# Patient Record
Sex: FEMALE | Race: WHITE | NOT HISPANIC OR LATINO
[De-identification: names, ages, dates, MRNs, and addresses within clinical notes are randomized per-mention and may not be internally consistent; named-entity substitution may affect disease eponyms.]

---

## 2017-02-06 ENCOUNTER — APPOINTMENT (OUTPATIENT)
Dept: INTERNAL MEDICINE | Facility: CLINIC | Age: 62
End: 2017-02-06

## 2017-02-06 VITALS
OXYGEN SATURATION: 97 % | DIASTOLIC BLOOD PRESSURE: 72 MMHG | HEIGHT: 62.5 IN | TEMPERATURE: 97.8 F | SYSTOLIC BLOOD PRESSURE: 118 MMHG | WEIGHT: 127 LBS | BODY MASS INDEX: 22.79 KG/M2 | HEART RATE: 63 BPM

## 2017-02-06 DIAGNOSIS — Z00.00 ENCOUNTER FOR GENERAL ADULT MEDICAL EXAMINATION W/OUT ABNORMAL FINDINGS: ICD-10-CM

## 2017-02-06 DIAGNOSIS — R53.83 OTHER MALAISE: ICD-10-CM

## 2017-02-06 DIAGNOSIS — R53.81 OTHER MALAISE: ICD-10-CM

## 2017-02-07 LAB
ALBUMIN SERPL ELPH-MCNC: 4.7 G/DL
ALP BLD-CCNC: 63 U/L
ALT SERPL-CCNC: 19 U/L
ANION GAP SERPL CALC-SCNC: 19 MMOL/L
APPEARANCE: CLEAR
AST SERPL-CCNC: 22 U/L
BASOPHILS # BLD AUTO: 0.03 K/UL
BASOPHILS NFR BLD AUTO: 0.4 %
BILIRUB SERPL-MCNC: 0.2 MG/DL
BILIRUBIN URINE: NEGATIVE
BLOOD URINE: NEGATIVE
BUN SERPL-MCNC: 18 MG/DL
CALCIUM SERPL-MCNC: 9.8 MG/DL
CHLORIDE SERPL-SCNC: 98 MMOL/L
CHOLEST SERPL-MCNC: 271 MG/DL
CHOLEST/HDLC SERPL: 3.9 RATIO
CO2 SERPL-SCNC: 23 MMOL/L
COLOR: YELLOW
CREAT SERPL-MCNC: 0.83 MG/DL
EOSINOPHIL # BLD AUTO: 0.1 K/UL
EOSINOPHIL NFR BLD AUTO: 1.5 %
GLUCOSE QUALITATIVE U: NORMAL MG/DL
GLUCOSE SERPL-MCNC: 90 MG/DL
HCT VFR BLD CALC: 44.4 %
HDLC SERPL-MCNC: 70 MG/DL
HGB BLD-MCNC: 14 G/DL
IMM GRANULOCYTES NFR BLD AUTO: 0.1 %
KETONES URINE: NEGATIVE
LDLC SERPL CALC-MCNC: 160 MG/DL
LEUKOCYTE ESTERASE URINE: NEGATIVE
LYMPHOCYTES # BLD AUTO: 2.47 K/UL
LYMPHOCYTES NFR BLD AUTO: 37 %
MAN DIFF?: NORMAL
MCHC RBC-ENTMCNC: 30.4 PG
MCHC RBC-ENTMCNC: 31.5 GM/DL
MCV RBC AUTO: 96.5 FL
MONOCYTES # BLD AUTO: 0.46 K/UL
MONOCYTES NFR BLD AUTO: 6.9 %
NEUTROPHILS # BLD AUTO: 3.61 K/UL
NEUTROPHILS NFR BLD AUTO: 54.1 %
NITRITE URINE: NEGATIVE
PH URINE: 6.5
PLATELET # BLD AUTO: 240 K/UL
POTASSIUM SERPL-SCNC: 4.4 MMOL/L
PROT SERPL-MCNC: 7 G/DL
PROTEIN URINE: NEGATIVE MG/DL
RBC # BLD: 4.6 M/UL
RBC # FLD: 13.7 %
SODIUM SERPL-SCNC: 140 MMOL/L
SPECIFIC GRAVITY URINE: 1
TRIGL SERPL-MCNC: 206 MG/DL
TSH SERPL-ACNC: 1.99 UIU/ML
UROBILINOGEN URINE: NORMAL MG/DL
WBC # FLD AUTO: 6.68 K/UL

## 2017-02-08 LAB
APPEARANCE: CLEAR
BILIRUBIN URINE: NEGATIVE
BLOOD URINE: NEGATIVE
COLOR: YELLOW
GLUCOSE QUALITATIVE U: NORMAL MG/DL
KETONES URINE: NEGATIVE
LEUKOCYTE ESTERASE URINE: NEGATIVE
NITRITE URINE: NEGATIVE
PH URINE: 6.5
PROTEIN URINE: NEGATIVE MG/DL
SPECIFIC GRAVITY URINE: 1.01
UROBILINOGEN URINE: NORMAL MG/DL

## 2017-02-09 LAB — 25(OH)D3 SERPL-MCNC: 29.5 NG/ML

## 2017-04-19 ENCOUNTER — MESSAGE (OUTPATIENT)
Age: 62
End: 2017-04-19

## 2018-02-10 ENCOUNTER — MOBILE ON CALL (OUTPATIENT)
Age: 63
End: 2018-02-10

## 2018-02-12 ENCOUNTER — MESSAGE (OUTPATIENT)
Age: 63
End: 2018-02-12

## 2018-05-13 ENCOUNTER — RX RENEWAL (OUTPATIENT)
Age: 63
End: 2018-05-13

## 2019-01-14 ENCOUNTER — FORM ENCOUNTER (OUTPATIENT)
Age: 64
End: 2019-01-14

## 2019-01-15 ENCOUNTER — APPOINTMENT (OUTPATIENT)
Dept: ORTHOPEDIC SURGERY | Facility: CLINIC | Age: 64
End: 2019-01-15
Payer: COMMERCIAL

## 2019-01-15 ENCOUNTER — OUTPATIENT (OUTPATIENT)
Dept: OUTPATIENT SERVICES | Facility: HOSPITAL | Age: 64
LOS: 1 days | End: 2019-01-15
Payer: COMMERCIAL

## 2019-01-15 VITALS
WEIGHT: 127 LBS | HEART RATE: 70 BPM | BODY MASS INDEX: 22.79 KG/M2 | HEIGHT: 62.5 IN | OXYGEN SATURATION: 98 % | DIASTOLIC BLOOD PRESSURE: 85 MMHG | SYSTOLIC BLOOD PRESSURE: 120 MMHG

## 2019-01-15 DIAGNOSIS — M47.816 SPONDYLOSIS W/OUT MYELOPATHY OR RADICULOPATHY, LUMBAR REGION: ICD-10-CM

## 2019-01-15 DIAGNOSIS — M48.061 SPINAL STENOSIS, LUMBAR REGION WITHOUT NEUROGENIC CLAUDICATION: ICD-10-CM

## 2019-01-15 DIAGNOSIS — Z82.69 FAMILY HISTORY OF OTHER DISEASES OF THE MUSCULOSKELETAL SYSTEM AND CONNECTIVE TISSUE: ICD-10-CM

## 2019-01-15 DIAGNOSIS — M51.36 OTHER INTERVERTEBRAL DISC DEGENERATION, LUMBAR REGION: ICD-10-CM

## 2019-01-15 DIAGNOSIS — Z60.2 PROBLEMS RELATED TO LIVING ALONE: ICD-10-CM

## 2019-01-15 PROCEDURE — 99204 OFFICE O/P NEW MOD 45 MIN: CPT

## 2019-01-15 PROCEDURE — 73521 X-RAY EXAM HIPS BI 2 VIEWS: CPT

## 2019-01-15 PROCEDURE — 73521 X-RAY EXAM HIPS BI 2 VIEWS: CPT | Mod: 26

## 2019-01-15 PROCEDURE — 72110 X-RAY EXAM L-2 SPINE 4/>VWS: CPT | Mod: 26

## 2019-01-15 PROCEDURE — 72110 X-RAY EXAM L-2 SPINE 4/>VWS: CPT

## 2019-01-15 SDOH — SOCIAL STABILITY - SOCIAL INSECURITY: PROBLEMS RELATED TO LIVING ALONE: Z60.2

## 2019-01-18 PROBLEM — M47.816 FACET ARTHROPATHY, LUMBAR: Status: ACTIVE | Noted: 2019-01-18

## 2019-01-18 PROBLEM — Z82.69 FAMILY HISTORY OF OSTEOPENIA: Status: ACTIVE | Noted: 2019-01-15

## 2019-01-18 PROBLEM — M51.36 DISC DEGENERATION, LUMBAR: Status: ACTIVE | Noted: 2019-01-18

## 2019-01-18 PROBLEM — M48.061 DEGENERATIVE LUMBAR SPINAL STENOSIS: Status: ACTIVE | Noted: 2019-01-18

## 2019-01-18 NOTE — DISCUSSION/SUMMARY
[de-identified] : I discussed my findings with the patinet.  Ms. Friedman is suffering from right lower back and buttocks pain.  Differential includes degenerative disc disease, facet arthropathy, hip pathology, SI joint, vs. muscular.  Patient wishes to have injections.  Referred to pain management to trial epidural vs. facet injections.  Follow up after injections in 6-8 weeks, sooner if needed.  All questions answered.

## 2019-01-18 NOTE — PHYSICAL EXAM
[FreeTextEntry2] : General: patient is well developed, well nourished, in no acute \par distress, alert and oriented x 3. \par \par Mood and affect: normal\par \par Respiratory: no respiratory distress noted\par \par Skin: no scars over spine, skin intact, no erythema, increased warmth\par \par Alignment:The spine is well compensated in the coronal and sagittal plane.  There is no asymmetry on the medeiros forward bend test\par \par Gait: The patient is able to toe walk and heel walk without difficulty. The patient is able to tandem gait without difficulty.\par \par Palpation: no tenderness to palpation spine or paraspinal region\par \par Range of motion: Lumbar spine ROM is restricted\par \par Neurologic Exam:\par Motor: Manual Muscle testing in the lower extremities is 5 out of 5 in all muscle groups. There is no evidence of muscular atrophy in the lower extremities. Sensory: Sensation to light touch is grossly intact in the lower extremities\par \par Reflexes: DTR are present and symmetric throughout, negative haddad bilaterally, negative inverted radial reflex bilaterally, no clonus, plantar responses are flexor\par \par Hip Exam: + mild pain with IR of right hip, No pain with internal or external rotation of left hip\par \par Special tests: Straight leg raise test negative.  Cross straight leg test negative.  MEENU test negative\par \par Vascular: Examination of the peripheral vascular system demonstrates no evidence of congestion or edema. no evidence of lymphedema bilateral lower extremities, pulses are present and symmetric in both lower extremities.\par \par  [de-identified] : XR bilateral hips/pelvis 1/15/19: minimal degenerative changes in both hips, no fracture or dislocation\par \par XR lumbar 1/15/19: degenerative changes involving L4/5 and L5/S1 facets, mild L5/S1 disc height loss, no spondylolisthesis or dynamic instability, thoracolumbar levoscoliosis with apex at L1, no acute fractures\par \par MRI lumbar 11/2018: L4/5 and L5/S1 moderate/severe facet arthropathy; L4/5 grade 1 anterolisthesis, no significant central canal or foraminal stenosis

## 2019-01-18 NOTE — HISTORY OF PRESENT ILLNESS
[All Other ROS Normal] : All other review of systems are negative except as noted [FreeTextEntry1] : low back/buttock pain for 1 year [FreeTextEntry2] : Ms. AGUDELO is a very pleasant 63 year old female who complains of low back pain for 1 year, atraumatic. On initial presentation symptoms were as follows: Patient described the pain as intermittent.  Patient rated the pain as 7/10 severity.  The pain localized to right side of her lower back/right buttock.  There is no radiation of pain.  Patient  denies extremity numbness and paresthesias. The pain is relieved by lying down.  The pain is worsened by activity. Past treatments included pharmacologic management, with minimal temporary relief. She had 1 session of acupuncture with significant temporary relief, will not return to do expense. The patient has not had physical therapy or steroid injections.\par \par The patient reports no loss of hand dexterity.\par The patient states there no loss of balance when walking.\par There no sensory loss in the arms or legs\par The patent no difficulty with urination.\par \par The patient no history of previous spine surgery.\par The patient no previous spine consultation.\par \par The patient has no history of unexpected weight loss, no history of active cancer, no history bladder or bowel dysfunction, no night pain, no fevers or chills.\par \par The past medical history, surgical history, family history, allergies, medications, review of systems, family history and social history were reviewed and non contributory.\par \par

## 2019-10-02 PROBLEM — Z60.2 PERSON LIVING ALONE: Status: ACTIVE | Noted: 2019-01-15

## 2020-01-08 ENCOUNTER — APPOINTMENT (OUTPATIENT)
Dept: INTERNAL MEDICINE | Facility: CLINIC | Age: 65
End: 2020-01-08
Payer: COMMERCIAL

## 2020-01-08 VITALS
WEIGHT: 128 LBS | RESPIRATION RATE: 14 BRPM | SYSTOLIC BLOOD PRESSURE: 130 MMHG | OXYGEN SATURATION: 94 % | BODY MASS INDEX: 23.55 KG/M2 | TEMPERATURE: 98 F | HEIGHT: 62 IN | HEART RATE: 84 BPM | DIASTOLIC BLOOD PRESSURE: 80 MMHG

## 2020-01-08 DIAGNOSIS — F32.9 MAJOR DEPRESSIVE DISORDER, SINGLE EPISODE, UNSPECIFIED: ICD-10-CM

## 2020-01-08 DIAGNOSIS — Z00.00 ENCOUNTER FOR GENERAL ADULT MEDICAL EXAMINATION W/OUT ABNORMAL FINDINGS: ICD-10-CM

## 2020-01-08 PROCEDURE — 93000 ELECTROCARDIOGRAM COMPLETE: CPT

## 2020-01-08 PROCEDURE — 99396 PREV VISIT EST AGE 40-64: CPT

## 2020-01-08 PROCEDURE — 36415 COLL VENOUS BLD VENIPUNCTURE: CPT

## 2020-01-08 NOTE — PLAN
[FreeTextEntry1] : wellness complete\par  labs todayu\par f/u ECHO\par Back pain - PMR eval - \par Cardiac CT\par Valtrex for herpes labialis

## 2020-01-08 NOTE — HISTORY OF PRESENT ILLNESS
[de-identified] : \par reports she had a CPE 1.5 yrs ago with Dr Castellon.  \par Having low back pain- considering accupuncture. CSI.  \par reports she has a murmur- Echo performed.\par Colonoscopy in 2015 - due this year.  \par Mammogram - Gyn eval q 2 years.\par On effexor. \par ALready had a flu shot. \par Not shingrix yet.  \par playing singles tennis. \par \par Following with psychiatrist regularly

## 2020-01-08 NOTE — PHYSICAL EXAM
[Normal Rate] : normal rate  [No Edema] : there was no peripheral edema [Regular Rhythm] : with a regular rhythm [Normal] : affect was normal and insight and judgment were intact [de-identified] : 3/6 jazmine left axillary line

## 2020-01-10 LAB
25(OH)D3 SERPL-MCNC: 26.1 NG/ML
ALBUMIN SERPL ELPH-MCNC: 4.6 G/DL
ALP BLD-CCNC: 58 U/L
ALT SERPL-CCNC: 24 U/L
ANION GAP SERPL CALC-SCNC: 13 MMOL/L
APPEARANCE: CLEAR
AST SERPL-CCNC: 19 U/L
BASOPHILS # BLD AUTO: 0.06 K/UL
BASOPHILS NFR BLD AUTO: 0.9 %
BILIRUB SERPL-MCNC: 0.2 MG/DL
BILIRUBIN URINE: NEGATIVE
BLOOD URINE: NEGATIVE
BUN SERPL-MCNC: 15 MG/DL
CALCIUM SERPL-MCNC: 9.7 MG/DL
CHLORIDE SERPL-SCNC: 101 MMOL/L
CHOLEST SERPL-MCNC: 242 MG/DL
CHOLEST/HDLC SERPL: 4.1 RATIO
CO2 SERPL-SCNC: 26 MMOL/L
COLOR: YELLOW
CREAT SERPL-MCNC: 0.84 MG/DL
EOSINOPHIL # BLD AUTO: 0.16 K/UL
EOSINOPHIL NFR BLD AUTO: 2.5 %
ESTIMATED AVERAGE GLUCOSE: 111 MG/DL
GLUCOSE QUALITATIVE U: NEGATIVE
GLUCOSE SERPL-MCNC: 74 MG/DL
HBA1C MFR BLD HPLC: 5.5 %
HCT VFR BLD CALC: 44.8 %
HDLC SERPL-MCNC: 59 MG/DL
HGB BLD-MCNC: 13.2 G/DL
IMM GRANULOCYTES NFR BLD AUTO: 0.2 %
KETONES URINE: NEGATIVE
LDLC SERPL CALC-MCNC: 134 MG/DL
LEUKOCYTE ESTERASE URINE: NEGATIVE
LYMPHOCYTES # BLD AUTO: 2.31 K/UL
LYMPHOCYTES NFR BLD AUTO: 35.6 %
MAN DIFF?: NORMAL
MCHC RBC-ENTMCNC: 29.5 GM/DL
MCHC RBC-ENTMCNC: 29.7 PG
MCV RBC AUTO: 100.7 FL
MONOCYTES # BLD AUTO: 0.69 K/UL
MONOCYTES NFR BLD AUTO: 10.6 %
NEUTROPHILS # BLD AUTO: 3.26 K/UL
NEUTROPHILS NFR BLD AUTO: 50.2 %
NITRITE URINE: NEGATIVE
PH URINE: 6
PLATELET # BLD AUTO: 241 K/UL
POTASSIUM SERPL-SCNC: 4.1 MMOL/L
PROT SERPL-MCNC: 6.7 G/DL
PROTEIN URINE: NEGATIVE
RBC # BLD: 4.45 M/UL
RBC # FLD: 13.3 %
SODIUM SERPL-SCNC: 140 MMOL/L
SPECIFIC GRAVITY URINE: 1.02
TRIGL SERPL-MCNC: 244 MG/DL
TSH SERPL-ACNC: 3.13 UIU/ML
UROBILINOGEN URINE: NORMAL
WBC # FLD AUTO: 6.49 K/UL

## 2020-01-14 ENCOUNTER — OUTPATIENT (OUTPATIENT)
Dept: OUTPATIENT SERVICES | Facility: HOSPITAL | Age: 65
LOS: 1 days | End: 2020-01-14

## 2020-01-28 ENCOUNTER — APPOINTMENT (OUTPATIENT)
Dept: CT IMAGING | Facility: CLINIC | Age: 65
End: 2020-01-28

## 2020-02-05 ENCOUNTER — APPOINTMENT (OUTPATIENT)
Dept: DERMATOLOGY | Facility: CLINIC | Age: 65
End: 2020-02-05
Payer: COMMERCIAL

## 2020-02-05 DIAGNOSIS — Z12.83 ENCOUNTER FOR SCREENING FOR MALIGNANT NEOPLASM OF SKIN: ICD-10-CM

## 2020-02-05 DIAGNOSIS — L85.9 EPIDERMAL THICKENING, UNSPECIFIED: ICD-10-CM

## 2020-02-05 DIAGNOSIS — L82.1 OTHER SEBORRHEIC KERATOSIS: ICD-10-CM

## 2020-02-05 DIAGNOSIS — Z91.89 OTHER SPECIFIED PERSONAL RISK FACTORS, NOT ELSEWHERE CLASSIFIED: ICD-10-CM

## 2020-02-05 DIAGNOSIS — B07.8 OTHER VIRAL WARTS: ICD-10-CM

## 2020-02-05 PROCEDURE — 99203 OFFICE O/P NEW LOW 30 MIN: CPT | Mod: 25

## 2020-02-05 PROCEDURE — 17110 DESTRUCTION B9 LES UP TO 14: CPT

## 2021-03-29 ENCOUNTER — APPOINTMENT (OUTPATIENT)
Dept: HEART AND VASCULAR | Facility: CLINIC | Age: 66
End: 2021-03-29
Payer: MEDICARE

## 2021-03-29 ENCOUNTER — NON-APPOINTMENT (OUTPATIENT)
Age: 66
End: 2021-03-29

## 2021-03-29 VITALS
WEIGHT: 129.57 LBS | TEMPERATURE: 98.7 F | OXYGEN SATURATION: 97 % | BODY MASS INDEX: 23.84 KG/M2 | HEART RATE: 73 BPM | SYSTOLIC BLOOD PRESSURE: 130 MMHG | HEIGHT: 62 IN | DIASTOLIC BLOOD PRESSURE: 58 MMHG

## 2021-03-29 DIAGNOSIS — E78.5 HYPERLIPIDEMIA, UNSPECIFIED: ICD-10-CM

## 2021-03-29 PROCEDURE — 93000 ELECTROCARDIOGRAM COMPLETE: CPT

## 2021-03-29 PROCEDURE — 99203 OFFICE O/P NEW LOW 30 MIN: CPT

## 2021-03-29 RX ORDER — FLUOXETINE HYDROCHLORIDE 10 MG/1
10 CAPSULE ORAL
Qty: 30 | Refills: 0 | Status: DISCONTINUED | COMMUNITY
Start: 2021-03-20 | End: 2021-03-29

## 2021-03-29 NOTE — HISTORY OF PRESENT ILLNESS
[FreeTextEntry1] : PCP- Dr Castellon\par Psych- Dr Jamari Saldaña\par \par Pt is the neice of Dr Den Friedman

## 2021-03-29 NOTE — PHYSICAL EXAM
[General Appearance - Well Developed] : well developed [Normal Appearance] : normal appearance [Well Groomed] : well groomed [General Appearance - Well Nourished] : well nourished [No Deformities] : no deformities [General Appearance - In No Acute Distress] : no acute distress [Normal Conjunctiva] : the conjunctiva exhibited no abnormalities [Eyelids - No Xanthelasma] : the eyelids demonstrated no xanthelasmas [Heart Rate And Rhythm] : heart rate and rhythm were normal [Heart Sounds] : normal S1 and S2 [Arterial Pulses Normal] : the arterial pulses were normal [Edema] : no peripheral edema present [Veins - Varicosity Changes] : no varicosital changes were noted in the lower extremities [Respiration, Rhythm And Depth] : normal respiratory rhythm and effort [Exaggerated Use Of Accessory Muscles For Inspiration] : no accessory muscle use [Auscultation Breath Sounds / Voice Sounds] : lungs were clear to auscultation bilaterally [Abdomen Soft] : soft [Abdomen Tenderness] : non-tender [Abdomen Mass (___ Cm)] : no abdominal mass palpated [Abnormal Walk] : normal gait [Gait - Sufficient For Exercise Testing] : the gait was sufficient for exercise testing [Nail Clubbing] : no clubbing of the fingernails [Cyanosis, Localized] : no localized cyanosis [Petechial Hemorrhages (___cm)] : no petechial hemorrhages [Skin Color & Pigmentation] : normal skin color and pigmentation [] : no rash [No Venous Stasis] : no venous stasis [Skin Lesions] : no skin lesions [No Skin Ulcers] : no skin ulcer [No Xanthoma] : no  xanthoma was observed [FreeTextEntry1] : 2/6 mid systolic murmur preceded by a click.

## 2021-03-29 NOTE — ASSESSMENT
[FreeTextEntry1] : Murmur- has a "click-murmur" c/w MVP.  Echo ordered,\par \par Fam hx of CAD- has a CCS of Zero

## 2021-03-29 NOTE — REASON FOR VISIT
[FreeTextEntry1] : 66 y/o F with a h/o a murmur.   Has a CCS of zero.  Walks a lot, plays tennis.  had both Covid shots.\par \par \par EKG: NSR, normal axis and intervals, no ST-Tw abnormalities. 3/29/21

## 2021-04-14 ENCOUNTER — APPOINTMENT (OUTPATIENT)
Dept: HEART AND VASCULAR | Facility: CLINIC | Age: 66
End: 2021-04-14
Payer: MEDICARE

## 2021-04-14 PROCEDURE — 93306 TTE W/DOPPLER COMPLETE: CPT

## 2021-10-19 ENCOUNTER — APPOINTMENT (OUTPATIENT)
Dept: HEART AND VASCULAR | Facility: CLINIC | Age: 66
End: 2021-10-19
Payer: MEDICARE

## 2021-10-19 DIAGNOSIS — R06.02 SHORTNESS OF BREATH: ICD-10-CM

## 2021-10-19 PROCEDURE — 93325 DOPPLER ECHO COLOR FLOW MAPG: CPT

## 2021-10-19 PROCEDURE — 93321 DOPPLER ECHO F-UP/LMTD STD: CPT

## 2021-10-19 PROCEDURE — 93350 STRESS TTE ONLY: CPT

## 2021-11-08 ENCOUNTER — NON-APPOINTMENT (OUTPATIENT)
Age: 66
End: 2021-11-08

## 2021-11-08 ENCOUNTER — APPOINTMENT (OUTPATIENT)
Dept: CARDIOTHORACIC SURGERY | Facility: CLINIC | Age: 66
End: 2021-11-08
Payer: MEDICARE

## 2021-11-08 VITALS
SYSTOLIC BLOOD PRESSURE: 154 MMHG | HEART RATE: 83 BPM | BODY MASS INDEX: 23 KG/M2 | OXYGEN SATURATION: 96 % | TEMPERATURE: 96.8 F | DIASTOLIC BLOOD PRESSURE: 80 MMHG | HEIGHT: 62 IN | WEIGHT: 125 LBS | RESPIRATION RATE: 17 BRPM

## 2021-11-08 DIAGNOSIS — I50.32 CHRONIC DIASTOLIC (CONGESTIVE) HEART FAILURE: ICD-10-CM

## 2021-11-08 PROCEDURE — 99204 OFFICE O/P NEW MOD 45 MIN: CPT

## 2021-11-10 PROBLEM — I50.32 CHRONIC DIASTOLIC HEART FAILURE: Status: ACTIVE | Noted: 2021-11-10

## 2021-11-10 RX ORDER — TRAZODONE HYDROCHLORIDE 50 MG/1
50 TABLET ORAL
Refills: 0 | Status: ACTIVE | COMMUNITY
Start: 2021-01-21

## 2021-11-22 ENCOUNTER — FORM ENCOUNTER (OUTPATIENT)
Age: 66
End: 2021-11-22

## 2021-11-23 ENCOUNTER — OUTPATIENT (OUTPATIENT)
Dept: OUTPATIENT SERVICES | Facility: HOSPITAL | Age: 66
LOS: 1 days | End: 2021-11-23
Payer: MEDICARE

## 2021-11-23 ENCOUNTER — APPOINTMENT (OUTPATIENT)
Dept: CARDIOTHORACIC SURGERY | Facility: CLINIC | Age: 66
End: 2021-11-23
Payer: MEDICARE

## 2021-11-23 VITALS
TEMPERATURE: 97.4 F | BODY MASS INDEX: 23 KG/M2 | HEIGHT: 62 IN | WEIGHT: 125 LBS | OXYGEN SATURATION: 95 % | HEART RATE: 80 BPM | SYSTOLIC BLOOD PRESSURE: 131 MMHG | DIASTOLIC BLOOD PRESSURE: 63 MMHG | RESPIRATION RATE: 18 BRPM

## 2021-11-23 DIAGNOSIS — I34.1 NONRHEUMATIC MITRAL (VALVE) PROLAPSE: ICD-10-CM

## 2021-11-23 DIAGNOSIS — I34.0 NONRHEUMATIC MITRAL (VALVE) INSUFFICIENCY: ICD-10-CM

## 2021-11-23 PROCEDURE — 99214 OFFICE O/P EST MOD 30 MIN: CPT

## 2021-11-23 PROCEDURE — 93306 TTE W/DOPPLER COMPLETE: CPT | Mod: 26

## 2021-11-23 PROCEDURE — 76377 3D RENDER W/INTRP POSTPROCES: CPT | Mod: 26

## 2021-11-23 PROCEDURE — 93312 ECHO TRANSESOPHAGEAL: CPT

## 2021-11-23 PROCEDURE — 93312 ECHO TRANSESOPHAGEAL: CPT | Mod: 26

## 2021-11-23 PROCEDURE — 93306 TTE W/DOPPLER COMPLETE: CPT

## 2021-11-24 PROBLEM — I34.0 MITRAL REGURGITATION: Status: ACTIVE | Noted: 2021-11-10

## 2021-11-24 NOTE — PHYSICAL EXAM
[General Appearance - Alert] : alert [General Appearance - In No Acute Distress] : in no acute distress [General Appearance - Well Nourished] : well nourished [General Appearance - Well Developed] : well developed [Sclera] : the sclera and conjunctiva were normal [PERRL With Normal Accommodation] : pupils were equal in size, round, and reactive to light [Extraocular Movements] : extraocular movements were intact [Outer Ear] : the ears and nose were normal in appearance [Hearing Threshold Finger Rub Not Sweet [place]] : hearing was normal [Both Tympanic Membranes Were Examined] : both tympanic membranes were normal [Neck Appearance] : the appearance of the neck was normal [Neck Cervical Mass (___cm)] : no neck mass was observed [Jugular Venous Distention Increased] : there was no jugular-venous distention [Respiration, Rhythm And Depth] : normal respiratory rhythm and effort [Exaggerated Use Of Accessory Muscles For Inspiration] : no accessory muscle use [Auscultation Breath Sounds / Voice Sounds] : lungs were clear to auscultation bilaterally [Apical Impulse] : the apical impulse was normal [Heart Rate And Rhythm] : heart rate was normal and rhythm regular [Heart Sounds] : normal S1 and S2 [Systolic grade ___/6] : A grade [unfilled]/6 systolic murmur was heard. [Examination Of The Chest] : the chest was normal in appearance [Chest Visual Inspection Thoracic Asymmetry] : no chest asymmetry [2+] : left 2+ [No Abnormalities] : the abdominal aorta was not enlarged and no bruit was heard [Bowel Sounds] : normal bowel sounds [Abdomen Soft] : soft [Abdomen Tenderness] : non-tender [No CVA Tenderness] : no ~M costovertebral angle tenderness [Abnormal Walk] : normal gait [Nail Clubbing] : no clubbing  or cyanosis of the fingernails [Musculoskeletal - Swelling] : no joint swelling seen [Skin Color & Pigmentation] : normal skin color and pigmentation [Skin Turgor] : normal skin turgor [] : no rash [Cranial Nerves] : cranial nerves 2-12 were intact [Deep Tendon Reflexes (DTR)] : deep tendon reflexes were 2+ and symmetric [Sensation] : the sensory exam was normal to light touch and pinprick [Oriented To Time, Place, And Person] : oriented to person, place, and time [Impaired Insight] : insight and judgment were intact [Affect] : the affect was normal [Right Carotid Bruit] : no bruit heard over the right carotid [Left Carotid Bruit] : no bruit heard over the left carotid [FreeTextEntry1] : deferred

## 2021-11-24 NOTE — HISTORY OF PRESENT ILLNESS
[FreeTextEntry1] : 66 year old female with a family history of heart disease and a past medical history of depression, hyperlipidemia and chronic diastolic heart failure with severe mitral regurgitation who presents for surgical consultation. She is currently under the care of Dr. Zuniga and Dr. Castellon.\par \par The patient was diagnosed with mitral valve prolapse many years ago and has been followed with serial studies. She states she has been feeling well without any over cardiac symptoms. She reports being able to walk miles in the city without any symptoms or limitations. She does recall one episode of shortness of breath while playing tennis this summer that felt abnormal to her. She also reports feeling 'winded' with strenuous activity. The patient denies chest pain, orthopnea, PND, dizziness, syncope, LE edema and palpitations. \par \par The patient works part time as an . She remains independent with her ADLs. She lives at home with her spouse. \par

## 2021-11-24 NOTE — END OF VISIT
[Time Spent: ___ minutes] : I have spent [unfilled] minutes of time on the encounter. [FreeTextEntry3] : I, JERI BUTLER , am scribing for and in the presence of Deni Peña the following sections: History of present illness, past Medical/family/surgical/family/social history, review of systems, vital signs, physical exam and disposition.\par I personally performed the services described in the documentation, reviewed the documentation recorded by the scribe in my presence and it accurately and completely records my words and actions.\par

## 2021-11-24 NOTE — DATA REVIEWED
[FreeTextEntry1] : Echocardiogram 11/23/21: \par 1. There is mitral valve prolapse of the anterior and posterior leaflets. There is at least moderate late systolic mitral regurgitation seen at a blood pressure of 161/88 mmHg.\par 2. No other significant valvular disease.\par 3. Normal left and right ventricular size and systolic function.\par 4. Trivial pericardial effusion.\par 5. No prior echo is available for comparison.\par \par MATIAS 11/23/21\par 1. No LA/RA/QAMAR/RAA thrombus seen.\par 2. There is mitral valve prolapse of the anterior and posterior leaflets. Although the mitral regurgitation is late systolic only, medial jet quantification is moderate via PISA method. There is an additional lateral jet. Overall, probably moderate to severe mitral regurgitation at a blood pressure of 140/70 mmHg. Clinical correlation advised.\par 3. No other significant valvular disease.\par 4. Normal left and right ventricular size and systolic function.\par 5. No pericardial effusion.\par 6. There is no significant plaque seen in the visualized portion of the descending aorta. There is mild non-mobile plaque seen in the visualized portion of the aortic arch.\par 7. Results of the study were discussed with Dr. Peña.\par \par Stress Echocardiogram 10/19/21:\par Exercise Data: \par Exercise Protocol: Juan Luis \par Duration: 7.5 minutes \par Peak heart rate: 164 \par Peak blood pressure: 160/90 \par Reason for terminating: achieved target HR \par Maximal Stage: 3 \par Symptoms: no chest pain \par ECG Changes: 0 - 0.5 mm upsloping ST depression \par Arrhythmias: no arrhythmias \par Patient achieved 103 percent of the maximum predicted heart rate \par \par Echocardiographic Findings: \par Normal segmental wall motion with an appropriate hyperdynamic response. \par \par 1. Normal exercise tolerance. \par 2. Normal ECG response to treadmill exercise. \par 3. Normal echocardiographic response to treadmill exercise. \par 4. Myxomatous MV with bileaflet prolapse, severe MR pre and post exercise, there is flow reversal in the pulmonary veins. \par \par Echocardiogram 4/14/21: \par 1. Normal right and left ventricular systolic function. Abnormal LV diastolic function.Ejection fraction is 60%-65% by visual estimate. \par 2. MVP with moderate to severe MR.Trace -mild TR.RVSP is 34 mmHg. \par 3. No pericardial effusion. \par

## 2021-11-24 NOTE — ASSESSMENT
[FreeTextEntry1] : 66 year old female with a family history of heart disease and a past medical history of depression, hyperlipidemia and chronic diastolic heart failure with severe mitral regurgitation who presents for surgical consultation. Dr. Peña reviewed the echocardiogram images with the patient and her  and discussed the case with Dr. Alcaraz and Dr. Zuniga. Dr. Peañ discussed the risks, benefits and alternatives to surgery and the various surgical approaches, minimally invasive versus sternotomy. Risks include but not limited to death, heart attack, bleeding, stroke, kidney problems and infection. Dr. Peña quoted a less than 1% operative mortality and complication risk. Dr. Peña feels the patient will benefit from and is a candidate for Minimally Invasive Mitral Valve Repair. All questions were addressed. \par \par At this time Dr. Peña feels that intervention is not urgent. He recommended that is she decides to wait more than 3  months to undergo another stress echocardiogram with her cardiologist.\par \par Plan: \par 1.The patient would like to wait until January and will call us to set up surgical date. \par 2. Continue medication regimen.\par 3. Follow up with cardiologist and PCP.\par 4. Blood pressure management.\par  \par

## 2021-11-29 NOTE — HISTORY OF PRESENT ILLNESS
[FreeTextEntry1] : \par 66 year old female with a family history of heart disease and a past medical history of depression, hyperlipidemia and chronic diastolic heart failure with severe mitral regurgitation who was referred for further evaluation of her structural heart disease. \par \par The patient was diagnosed with mitral valve prolapse many years ago and has been followed with serial studies. She states she has been feeling well without any over cardiac symptoms. She reports being able to walk miles in the city without any symptoms or limitations. She does recall one episode of shortness of breath while playing tennis this summer that felt abnormal to her. The patient denies chest pain, orthopnea, PND, dizziness, syncope, LE edema and palpitations. The patient has an ECHO in April that showed moderate to severe MR. She recently underwent a stress ECHO that showed severe mitral regurgitation before and after exercise.  \par \par The patient works part time as an . She remains independent with her ADLs. She lives at home with her spouse. \par \par

## 2022-07-16 ENCOUNTER — INPATIENT (INPATIENT)
Facility: HOSPITAL | Age: 67
LOS: 11 days | Discharge: HOME | DRG: 885 | End: 2022-07-28
Attending: PSYCHIATRY & NEUROLOGY | Admitting: PSYCHIATRY & NEUROLOGY
Payer: MEDICARE

## 2022-07-16 VITALS
WEIGHT: 121.03 LBS | SYSTOLIC BLOOD PRESSURE: 153 MMHG | RESPIRATION RATE: 18 BRPM | HEART RATE: 120 BPM | DIASTOLIC BLOOD PRESSURE: 109 MMHG | OXYGEN SATURATION: 95 % | TEMPERATURE: 98 F

## 2022-07-16 DIAGNOSIS — F33.2 MAJOR DEPRESSIVE DISORDER, RECURRENT SEVERE WITHOUT PSYCHOTIC FEATURES: ICD-10-CM

## 2022-07-16 LAB
ALBUMIN SERPL ELPH-MCNC: 4.6 G/DL — SIGNIFICANT CHANGE UP (ref 3.3–5)
ALP SERPL-CCNC: 78 U/L — SIGNIFICANT CHANGE UP (ref 40–120)
ALT FLD-CCNC: 11 U/L — SIGNIFICANT CHANGE UP (ref 10–45)
AMPHET UR-MCNC: NEGATIVE — SIGNIFICANT CHANGE UP
ANION GAP SERPL CALC-SCNC: 13 MMOL/L — SIGNIFICANT CHANGE UP (ref 5–17)
APAP SERPL-MCNC: <5 UG/ML — LOW (ref 10–30)
AST SERPL-CCNC: 17 U/L — SIGNIFICANT CHANGE UP (ref 10–40)
BARBITURATES UR SCN-MCNC: NEGATIVE — SIGNIFICANT CHANGE UP
BASOPHILS # BLD AUTO: 0.04 K/UL — SIGNIFICANT CHANGE UP (ref 0–0.2)
BASOPHILS NFR BLD AUTO: 0.5 % — SIGNIFICANT CHANGE UP (ref 0–2)
BENZODIAZ UR-MCNC: NEGATIVE — SIGNIFICANT CHANGE UP
BILIRUB SERPL-MCNC: 0.2 MG/DL — SIGNIFICANT CHANGE UP (ref 0.2–1.2)
BUN SERPL-MCNC: 9 MG/DL — SIGNIFICANT CHANGE UP (ref 7–23)
CALCIUM SERPL-MCNC: 9.8 MG/DL — SIGNIFICANT CHANGE UP (ref 8.4–10.5)
CHLORIDE SERPL-SCNC: 99 MMOL/L — SIGNIFICANT CHANGE UP (ref 96–108)
CO2 SERPL-SCNC: 23 MMOL/L — SIGNIFICANT CHANGE UP (ref 22–31)
COCAINE METAB.OTHER UR-MCNC: NEGATIVE — SIGNIFICANT CHANGE UP
CREAT SERPL-MCNC: 0.7 MG/DL — SIGNIFICANT CHANGE UP (ref 0.5–1.3)
EGFR: 95 ML/MIN/1.73M2 — SIGNIFICANT CHANGE UP
EOSINOPHIL # BLD AUTO: 0.04 K/UL — SIGNIFICANT CHANGE UP (ref 0–0.5)
EOSINOPHIL NFR BLD AUTO: 0.5 % — SIGNIFICANT CHANGE UP (ref 0–6)
ETHANOL SERPL-MCNC: <10 MG/DL — SIGNIFICANT CHANGE UP (ref 0–10)
GLUCOSE SERPL-MCNC: 119 MG/DL — HIGH (ref 70–99)
HCT VFR BLD CALC: 43.4 % — SIGNIFICANT CHANGE UP (ref 34.5–45)
HGB BLD-MCNC: 14.3 G/DL — SIGNIFICANT CHANGE UP (ref 11.5–15.5)
IMM GRANULOCYTES NFR BLD AUTO: 0.2 % — SIGNIFICANT CHANGE UP (ref 0–1.5)
LYMPHOCYTES # BLD AUTO: 1.63 K/UL — SIGNIFICANT CHANGE UP (ref 1–3.3)
LYMPHOCYTES # BLD AUTO: 19 % — SIGNIFICANT CHANGE UP (ref 13–44)
MCHC RBC-ENTMCNC: 30.2 PG — SIGNIFICANT CHANGE UP (ref 27–34)
MCHC RBC-ENTMCNC: 32.9 GM/DL — SIGNIFICANT CHANGE UP (ref 32–36)
MCV RBC AUTO: 91.6 FL — SIGNIFICANT CHANGE UP (ref 80–100)
METHADONE UR-MCNC: NEGATIVE — SIGNIFICANT CHANGE UP
MONOCYTES # BLD AUTO: 0.7 K/UL — SIGNIFICANT CHANGE UP (ref 0–0.9)
MONOCYTES NFR BLD AUTO: 8.1 % — SIGNIFICANT CHANGE UP (ref 2–14)
NEUTROPHILS # BLD AUTO: 6.16 K/UL — SIGNIFICANT CHANGE UP (ref 1.8–7.4)
NEUTROPHILS NFR BLD AUTO: 71.7 % — SIGNIFICANT CHANGE UP (ref 43–77)
NRBC # BLD: 0 /100 WBCS — SIGNIFICANT CHANGE UP (ref 0–0)
OPIATES UR-MCNC: NEGATIVE — SIGNIFICANT CHANGE UP
PCP SPEC-MCNC: SIGNIFICANT CHANGE UP
PCP UR-MCNC: NEGATIVE — SIGNIFICANT CHANGE UP
PLATELET # BLD AUTO: 274 K/UL — SIGNIFICANT CHANGE UP (ref 150–400)
POTASSIUM SERPL-MCNC: 4.3 MMOL/L — SIGNIFICANT CHANGE UP (ref 3.5–5.3)
POTASSIUM SERPL-SCNC: 4.3 MMOL/L — SIGNIFICANT CHANGE UP (ref 3.5–5.3)
PROT SERPL-MCNC: 7.3 G/DL — SIGNIFICANT CHANGE UP (ref 6–8.3)
RBC # BLD: 4.74 M/UL — SIGNIFICANT CHANGE UP (ref 3.8–5.2)
RBC # FLD: 13.3 % — SIGNIFICANT CHANGE UP (ref 10.3–14.5)
SALICYLATES SERPL-MCNC: 10 MG/DL — SIGNIFICANT CHANGE UP (ref 2.8–20)
SARS-COV-2 RNA SPEC QL NAA+PROBE: NEGATIVE — SIGNIFICANT CHANGE UP
SODIUM SERPL-SCNC: 135 MMOL/L — SIGNIFICANT CHANGE UP (ref 135–145)
THC UR QL: NEGATIVE — SIGNIFICANT CHANGE UP
TSH SERPL-MCNC: 1.68 UIU/ML — SIGNIFICANT CHANGE UP (ref 0.27–4.2)
WBC # BLD: 8.59 K/UL — SIGNIFICANT CHANGE UP (ref 3.8–10.5)
WBC # FLD AUTO: 8.59 K/UL — SIGNIFICANT CHANGE UP (ref 3.8–10.5)

## 2022-07-16 PROCEDURE — 93010 ELECTROCARDIOGRAM REPORT: CPT

## 2022-07-16 PROCEDURE — 99285 EMERGENCY DEPT VISIT HI MDM: CPT

## 2022-07-16 RX ORDER — TRAZODONE HCL 50 MG
50 TABLET ORAL AT BEDTIME
Refills: 0 | Status: DISCONTINUED | OUTPATIENT
Start: 2022-07-16 | End: 2022-07-28

## 2022-07-16 RX ORDER — VENLAFAXINE HCL 75 MG
300 CAPSULE, EXT RELEASE 24 HR ORAL DAILY
Refills: 0 | Status: DISCONTINUED | OUTPATIENT
Start: 2022-07-17 | End: 2022-07-17

## 2022-07-16 RX ORDER — ACETAMINOPHEN 500 MG
650 TABLET ORAL EVERY 6 HOURS
Refills: 0 | Status: DISCONTINUED | OUTPATIENT
Start: 2022-07-16 | End: 2022-07-28

## 2022-07-16 RX ORDER — ARIPIPRAZOLE 15 MG/1
10 TABLET ORAL DAILY
Refills: 0 | Status: DISCONTINUED | OUTPATIENT
Start: 2022-07-17 | End: 2022-07-28

## 2022-07-16 RX ORDER — POLYETHYLENE GLYCOL 3350 17 G/17G
17 POWDER, FOR SOLUTION ORAL AT BEDTIME
Refills: 0 | Status: DISCONTINUED | OUTPATIENT
Start: 2022-07-16 | End: 2022-07-28

## 2022-07-16 RX ADMIN — Medication 1 MILLIGRAM(S): at 15:44

## 2022-07-16 RX ADMIN — Medication 50 MILLIGRAM(S): at 21:58

## 2022-07-16 NOTE — ED ADULT NURSE NOTE - CHIEF COMPLAINT QUOTE
Pt c/o depression. States "I don't want to hurt myself but I feel very close." Pt denies HI, hallucinations, alcohol/drug use. Pt belongings secured, 1:1 initiated, wanded in front triage.

## 2022-07-16 NOTE — ED BEHAVIORAL HEALTH ASSESSMENT NOTE - ADDITIONAL DETAILS ALL
reports she had intrusive thoughts of suicide without intent or planning around ten years ago; none recently

## 2022-07-16 NOTE — ED BEHAVIORAL HEALTH ASSESSMENT NOTE - HPI (INCLUDE ILLNESS QUALITY, SEVERITY, DURATION, TIMING, CONTEXT, MODIFYING FACTORS, ASSOCIATED SIGNS AND SYMPTOMS)
Patient was lying down in stretcher, calmly engaged in conversation with 1:1 staff upon entry of this writer for psychiatric evaluation. Patient reports she has been feeling "super depressed" since undergoing surgery for mitral valve prolapse in March 2022. Reports she "can't take it anymore" and that her emotional state has impacted her ability to do things she enjoys such as reading. Reports that she has discussed her 66 year old F, domiciled, past medical history of surgically repaired MVP in March 2022, past psychiatric history of narcissistic & borderline personality disorders, MDD, h/o 1 inpatient psychiatric hospitalization & ECT ~10 years ago, no history of suicide attempts or self-harm, presenting for worsening depressive symptoms since March 2022 which have not responded to outpatient treatment and medication management.     Patient was lying down in stretcher, calmly engaged in conversation with 1:1 staff upon entry of this writer for psychiatric evaluation. Patient reports she has been feeling "super depressed" since undergoing surgery for mitral valve prolapse in March 2022. Reports she "can't take it anymore" and that her emotional state has impacted her ability to do things she enjoys such as reading. Has not been motivated to remain engaged socially (which she was doing earlier in the year). Also has felt decrease in appetite, which requires her to be more vigilant of her food intake to ensure that she eats enough. Reports that she has discussed escalating depressed feelings with her psychiatrist, which has resulted in outpatient uptitration of her psychiatric medications.Reports that 3 days ago, her venlafaxine was raised to 300 mg po qday (had been taking 75 mg for years, uptitrated to 225 mg po qday 2 months ago) and abilify was increased to 10 mg po qhs (initiated at 5 mg po qhs in June). Also takes trazodone 50 mg po qhs and up to 3 mg of PRN lorazepam per day (on average, takes total of 1.5 mg per day). Reports that she started taking lorazepam after her MVP surgery in March, and that she had no history of regularly taking benzodiazepines prior to 2022.     Despite the medication changes described, patient reports that her depressed mood has continued to escalate. She reports feeling dejected for the majority of the day and has started looking into outpatient ECT options, since she found ECT helpful when she received it 10 years ago. Denies SI/HI/AVH. Denies thoughts of wanting to harm or kill self. Denies history of cutting self in the past or present. Denies history of episodes of decreased need for sleep or episodic changes in personality/behavior/impulsivity. 66 year old F, domiciled, past medical history of surgically repaired MVP in March 2022, past psychiatric history of narcissistic & borderline personality disorders, MDD, h/o 1 inpatient psychiatric hospitalization & ECT ~10 years ago, no history of suicide attempts or self-harm, presenting for worsening depressive symptoms since March 2022 which have not responded to outpatient treatment and medication management.     Patient was lying down in stretcher, calmly engaged in conversation with 1:1 staff upon entry of this writer for psychiatric evaluation. Patient reports she has been feeling "super depressed" since undergoing surgery for mitral valve prolapse in March 2022. Reports she "can't take it anymore" and that her emotional state has impacted her ability to do things she enjoys such as reading. Has not been motivated to remain engaged socially (which she was doing earlier in the year). Also has felt decrease in appetite, which requires her to be more vigilant of her food intake to ensure that she eats enough. Reports that she has discussed escalating depressed feelings with her psychiatrist, which has resulted in outpatient uptitration of her psychiatric medications.Reports that 3 days ago, her venlafaxine was raised to 300 mg po qday (had been taking 75 mg for years, uptitrated to 225 mg po qday 2 months ago) and abilify was increased to 10 mg po qhs (initiated at 5 mg po qhs in June). Also takes trazodone 50 mg po qhs and up to 3 mg of PRN lorazepam per day (on average, takes total of 1.5 mg per day). Reports that she started taking lorazepam after her MVP surgery in March, and that she had no history of regularly taking benzodiazepines prior to 2022. Of note, patient reports that her sleep has gotten worse since she started abilify (difficulty falling asleep and early morning awakenings). Takes abilify at night and has never taken it during the daytime.     Despite the medication changes described, patient reports that her depressed mood has continued to escalate. She reports feeling dejected for the majority of the day and has started looking into outpatient ECT options, since she found ECT helpful when she received it 10 years ago. Denies SI/HI/AVH. Denies thoughts of wanting to harm or kill self. Denies history of cutting self in the past or present. Denies history of episodes of decreased need for sleep or episodic changes in personality/behavior/impulsivity.
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
## 2022-07-16 NOTE — ED BEHAVIORAL HEALTH ASSESSMENT NOTE - REFERRED BY
Medication: acetaminophen 650 mg  Time: 1614    1. What PRN did patient receive? Other pain reliever    2. What was the patient doing that led to the PRN medication? Pain. Pt reported a headache rated at 7/10 on pain scale for 2 hours.    3. Did they require R/S? NO    4. Side effects to PRN medication? None    5. After 1 Hour, patient appeared: Calm. Pt would not respond to writer.   Self

## 2022-07-16 NOTE — ED BEHAVIORAL HEALTH ASSESSMENT NOTE - SUMMARY
66 year old F, domiciled, past medical history of surgically repaired MVP in March 2022, past psychiatric history of narcissistic & borderline personality disorders, MDD, h/o 1 inpatient psychiatric hospitalization & ECT ~10 years ago, no history of suicide attempts or self-harm, presenting for worsening depressive symptoms since March 2022 which have not responded to outpatient treatment and medication management.     Patient presents for voluntary hospitalization in context of worsening depressive symptoms that are interfering with her ability to function (hasn't been eating) despite active management by outpatient providers. She fulfills criteria for major depressive episode. Collateral from outpatient provider during business hours will clarify the patient's characterological contributors to her presentation given her self-reported history of multiple personality disorders.     Patient is at low acute risk of suicide given absence of current SI and absence of history of suicide attempts. However patient is at chronically elevated risk given history of inpatient psychiatric hospitalization, family history of ECT tx, and multiple psychiatric diagnoses.    #MDD  -Continue the patient's outpatient medications: venlafaxine 300 mg po qday, trazodone 50 mg po qhs, lorazepam 0.5 mg po TID prn anxiety, abilify 10 mg po qday   -of note, patient was taking abilify qhs and reports that she experienced insomnia afterwards and is amenable to attempting daytime dosing  -Monitor sleep and mood with change in time of abilify dosing   -Collect collateral from outpatient psychiatrist Dr. Jamari Saldaña during business hours regarding patient's history and potential characterological contributors to current presentation given history of multiple personality disorders per self-report

## 2022-07-16 NOTE — ED BEHAVIORAL HEALTH ASSESSMENT NOTE - OTHER PAST PSYCHIATRIC HISTORY (INCLUDE DETAILS REGARDING ONSET, COURSE OF ILLNESS, INPATIENT/OUTPATIENT TREATMENT)
-Current outpatient psychaitrist is Dr. Jamari Saldaña, who she has been seeing for the past 10 years. -Recently connected with outpatient therapist Dr. Marianne Pratt.   -Reports she has been dx with MDD, narcissistic personality disorder, and borderline personality disorder.   -Reports history of one inpatient psychiatric hospitalization ~10 years ago, at which time she received ECT (only time undergoing ECT in lifetime).   -Denies history of suicide attempts.   -Denies history of suicidal ideations but states that she would have intrusive thoughts of driving off of the highway around the time of her inpatient psychiatric hospitalization ~10 years ago (didn't act or felt compelled to act).    -Reports h/o psychotropic trials including current meds of venlafaxine/abilify/trazodone/lorazepam (refer to HPI for details regarding those meds). Reports history of tx w/ brexpiprazole which had activating and unpleasant side effects. Reports history of other psychotropic agents around the time of her hospitalziation / ECT ~10 years ago but doesn't remember their names. Reports history of side effects to those agents at that time but doesn't recall details at the time of this writer's psychiatric evaluation.

## 2022-07-16 NOTE — ED BEHAVIORAL HEALTH ASSESSMENT NOTE - CURRENT MEDICATION
venlafaxine 300 mg po qday, abilify 10 mg po qhs, trazodone 50 mg po qhs, lorazepam 1 mg po TID PRN anxiety (on average takes daily total of 1.5 mg)

## 2022-07-16 NOTE — ED ADULT TRIAGE NOTE - CHIEF COMPLAINT QUOTE
Pt c/o depression. States "I don't want to hurt myself but I feel very close." Pt denies HI, hallucinations, alcohol/drug use. Pt belongings secured, 1:1 initiated. Pt c/o depression. States "I don't want to hurt myself but I feel very close." Pt denies HI, hallucinations, alcohol/drug use. Pt belongings secured, 1:1 initiated, wanded in front triage.

## 2022-07-16 NOTE — ED PROVIDER NOTE - OBJECTIVE STATEMENT
65 y/o F, PMHx of major depression and mitral valve repair, now p/w depressive episode and came for psychiatric assessment. Pt reports episode of major depressive episode 10 yrs ago. Pt was hospitalized at that time and ECT was done which was successful. Pt was not suicidal at that time. Pt reports being on Effexor since. Until March after mitral valve surgery and personal stressors including difficult relationship with separate , pt has been having depression episode. She states having little energy to do anything of interest. She denies SI. Pt has tried several medication changes w/h no effect. Pt is currently on Effexor. Current plan from current psychiatrist to have ECT, although cannot get appt in near future. She fears she cannot wait that long, so she came to ED for psychiatric assessment. Pt denies drug or alcohol use.

## 2022-07-16 NOTE — ED PROVIDER NOTE - SHIFT CHANGE DETAILS
66F c/o depression. avss. medically cleared. psych consulted. reccs for vol psych admission.    dispo: pending covid result -- anticipate admission to dr medellin

## 2022-07-16 NOTE — ED ADULT NURSE NOTE - NSIMPLEMENTINTERV_GEN_ALL_ED
Implemented All Universal Safety Interventions:  [place] to call system. Call bell, personal items and telephone within reach. Instruct patient to call for assistance. Room bathroom lighting operational. Non-slip footwear when patient is off stretcher. Physically safe environment: no spills, clutter or unnecessary equipment. Stretcher in lowest position, wheels locked, appropriate side rails in place.

## 2022-07-16 NOTE — ED ADULT NURSE NOTE - OBJECTIVE STATEMENT
Pt received aaox3 c/o worsening depression. Pt's was scheduled for ECT later this week but felt like she could not wait until the appointment. Pt recently upped her dosage of antidepressants with MD's rx. Pt denies any SI but states "she feels she is getting closer". Pt changed into a gown. Pt placed on 1:1. Safety measures in place.

## 2022-07-16 NOTE — BH PATIENT PROFILE - FALL HARM RISK - UNIVERSAL INTERVENTIONS
Bed in lowest position, wheels locked, appropriate side rails in place/Call bell, personal items and telephone in reach/Instruct patient to call for assistance before getting out of bed or chair/Non-slip footwear when patient is out of bed/[place] to call system/Physically safe environment - no spills, clutter or unnecessary equipment/Purposeful Proactive Rounding/Room/bathroom lighting operational, light cord in reach

## 2022-07-16 NOTE — ED PROVIDER NOTE - PHYSICAL EXAMINATION
VITAL SIGNS: I have reviewed nursing notes and confirm.  CONSTITUTIONAL: Well-developed; well-nourished; in no acute distress.  SKIN: Agree with RN documentation regarding decubitus evaluation. Remainder of skin exam is warm and dry, no acute rash.  HEAD: Normocephalic; atraumatic.  EYES: PERRL, EOM intact; conjunctiva and sclera clear.  ENT: No nasal discharge; airway clear.  NECK: Supple; non tender.  CARD: S1, S2 normal; no murmurs, gallops, or rubs. Regular rate and rhythm.  RESP: No wheezes, rales or rhonchi.  ABD: Normal bowel sounds; soft; non-distended; non-tender; no hepatosplenomegaly.  EXT: Normal ROM. No clubbing, cyanosis or edema.  LYMPH: No acute cervical adenopathy.  NEURO: Alert, oriented. Grossly unremarkable.  PSYCH: Cooperative, appropriate. Depressed affect. No SI noted.

## 2022-07-16 NOTE — ED PROVIDER NOTE - CLINICAL SUMMARY MEDICAL DECISION MAKING FREE TEXT BOX
67 y/o with recurrent depressive episode. Will medically evaluate and consult w/h psychiatry. 65 y/o with recurrent depressive episode. Will medically evaluate and consult w psychiatry.

## 2022-07-16 NOTE — ED BEHAVIORAL HEALTH ASSESSMENT NOTE - NSSUICPROTFACT_PSY_ALL_CORE
Has been engaged in psychiatric outpatient care for years/Positive therapeutic relationships/[employer] beliefs

## 2022-07-17 LAB — HCG UR QL: NEGATIVE — SIGNIFICANT CHANGE UP

## 2022-07-17 PROCEDURE — 99233 SBSQ HOSP IP/OBS HIGH 50: CPT

## 2022-07-17 RX ORDER — VENLAFAXINE HCL 75 MG
300 CAPSULE, EXT RELEASE 24 HR ORAL DAILY
Refills: 0 | Status: DISCONTINUED | OUTPATIENT
Start: 2022-07-18 | End: 2022-07-28

## 2022-07-17 RX ADMIN — Medication 50 MILLIGRAM(S): at 21:18

## 2022-07-17 RX ADMIN — Medication 0.5 MILLIGRAM(S): at 07:39

## 2022-07-17 RX ADMIN — Medication 0.5 MILLIGRAM(S): at 21:19

## 2022-07-17 RX ADMIN — Medication 0.5 MILLIGRAM(S): at 12:50

## 2022-07-17 RX ADMIN — Medication 300 MILLIGRAM(S): at 09:13

## 2022-07-17 RX ADMIN — ARIPIPRAZOLE 10 MILLIGRAM(S): 15 TABLET ORAL at 09:14

## 2022-07-17 NOTE — BH INPATIENT PSYCHIATRY ASSESSMENT NOTE - HPI (INCLUDE ILLNESS QUALITY, SEVERITY, DURATION, TIMING, CONTEXT, MODIFYING FACTORS, ASSOCIATED SIGNS AND SYMPTOMS)
66 year old F, domiciled, past medical history of surgically repaired MVP in March 2022, past psychiatric history of narcissistic & borderline personality disorders, MDD, h/o 1 inpatient psychiatric hospitalization & ECT ~10 years ago, no history of suicide attempts or self-harm, presenting for worsening depressive symptoms since March 2022 which have not responded to outpatient treatment and medication management.     Patient was lying down in stretcher, calmly engaged in conversation with 1:1 staff upon entry of this writer for psychiatric evaluation. Patient reports she has been feeling "super depressed" since undergoing surgery for mitral valve prolapse in March 2022. Reports she "can't take it anymore" and that her emotional state has impacted her ability to do things she enjoys such as reading. Has not been motivated to remain engaged socially (which she was doing earlier in the year). Also has felt decrease in appetite, which requires her to be more vigilant of her food intake to ensure that she eats enough. Reports that she has discussed escalating depressed feelings with her psychiatrist, which has resulted in outpatient uptitration of her psychiatric medications.Reports that 3 days ago, her venlafaxine was raised to 300 mg po qday (had been taking 75 mg for years, uptitrated to 225 mg po qday 2 months ago) and abilify was increased to 10 mg po qhs (initiated at 5 mg po qhs in June). Also takes trazodone 50 mg po qhs and up to 3 mg of PRN lorazepam per day (on average, takes total of 1.5 mg per day). Reports that she started taking lorazepam after her MVP surgery in March, and that she had no history of regularly taking benzodiazepines prior to 2022. Of note, patient reports that her sleep has gotten worse since she started abilify (difficulty falling asleep and early morning awakenings). Takes abilify at night and has never taken it during the daytime.     Despite the medication changes described, patient reports that her depressed mood has continued to escalate. She reports feeling dejected for the majority of the day and has started looking into outpatient ECT options, since she found ECT helpful when she received it 10 years ago. Denies SI/HI/AVH. Denies thoughts of wanting to harm or kill self. Denies history of cutting self in the past or present. Denies history of episodes of decreased need for sleep or episodic changes in personality/behavior/impulsivity. This is a 66-year-old domiciled female with PPHx of depression, narcissistic and borderline personality disorders, 1PPH in 2012 for depression and received 8 unilateral ECT treatments per patient, no pSA/NSSIB, BIBS for worsening depressive symptoms since a mitral valve relapse surgery in 3/2022 and patient feeling overwhelmed/more anxious having had no prior medical ailments and then requiring a heart surgery this past March. Patient stable and has routine f/u and TTE in the fall of this year, no other PMH. No substance use history.     Patient describes worsening depressive symptoms (low mood, restless sleep (<6h/night), reduced appetite, low energy, amotivation, anhedonia, and hopelessness in context of recent findings of mitral valve prolapse this year and required surgical correction in 3/2022. Patient describes this drastic change and medical ailment to be distressing and lead to worsening depressive symptoms. She does not require further medical management (other than routine cardiology f/u) and is not on any heart medications and has no PMH otherwise. Her outpatient psychiatrist increased Effexor from 225 to 300mg 3d prior to admission, and Abilify from 5 to 10mg on same timeline (corrected timeline compared to initial note). Patient reports being on Effexor for years with good effect at 75mg and psychiatrist only increased post-op period in 3/2022 to 225 and then again more recently I/c/o worsening depressive symptoms. Abilify was added on as an adjunctive medication around this time and last increase as described 3d ago. Patient notes that she was not sleeping, feeling very anxious, and was taking Abilify in the evenings, dose changed to AM dosing yesterday and she slept better and is hopeful about the medication changes having an effect, although shares insight into timeline for this can be 4+weeks. She denies SI and did not have active suicidal ideation prior to admission or since. No overt delusional content elicited. No manic or hypomanic symptoms noted. Discussed plan to continue current medications and monitor patient. Patient is eager to be discharged sharing discomfort on inpatient unit and provided psychoeducation on medications, timeline for full benefit with Effexor change recently, need for closer f/u on discharge. Patient in communication with her therapist who is helping to reach psychiatrist (on vacation) to coordinate a f/u appt.     No acute medical complaints. Labs and EKG unremarkable on review.  66 year old F, domiciled, past medical history of surgically repaired MVP in March 2022, past psychiatric history of narcissistic & borderline personality disorders, MDD, h/o 1 inpatient psychiatric hospitalization & ECT ~10 years ago, no history of suicide attempts or self-harm, presenting for worsening depressive symptoms since March 2022 which have not responded to outpatient treatment and medication management.     Patient was lying down in stretcher, calmly engaged in conversation with 1:1 staff upon entry of this writer for psychiatric evaluation. Patient reports she has been feeling "super depressed" since undergoing surgery for mitral valve prolapse in March 2022. Reports she "can't take it anymore" and that her emotional state has impacted her ability to do things she enjoys such as reading. Has not been motivated to remain engaged socially (which she was doing earlier in the year). Also has felt decrease in appetite, which requires her to be more vigilant of her food intake to ensure that she eats enough. Reports that she has discussed escalating depressed feelings with her psychiatrist, which has resulted in outpatient uptitration of her psychiatric medications.Reports that 3 days ago, her venlafaxine was raised to 300 mg po qday (had been taking 75 mg for years, uptitrated to 225 mg po qday 2 months ago) and abilify was increased to 10 mg po qhs (initiated at 5 mg po qhs in June). Also takes trazodone 50 mg po qhs and up to 3 mg of PRN lorazepam per day (on average, takes total of 1.5 mg per day). Reports that she started taking lorazepam after her MVP surgery in March, and that she had no history of regularly taking benzodiazepines prior to 2022. Of note, patient reports that her sleep has gotten worse since she started abilify (difficulty falling asleep and early morning awakenings). Takes abilify at night and has never taken it during the daytime.     Despite the medication changes described, patient reports that her depressed mood has continued to escalate. She reports feeling dejected for the majority of the day and has started looking into outpatient ECT options, since she found ECT helpful when she received it 10 years ago. Denies SI/HI/AVH. Denies thoughts of wanting to harm or kill self. Denies history of cutting self in the past or present. Denies history of episodes of decreased need for sleep or episodic changes in personality/behavior/impulsivity.

## 2022-07-17 NOTE — BH INPATIENT PSYCHIATRY ASSESSMENT NOTE - CURRENT MEDICATION
MEDICATIONS  (STANDING):  ARIPiprazole 10 milliGRAM(s) Oral daily  traZODone 50 milliGRAM(s) Oral at bedtime  venlafaxine 300 milliGRAM(s) Oral daily    MEDICATIONS  (PRN):  acetaminophen     Tablet .. 650 milliGRAM(s) Oral every 6 hours PRN Moderate Pain (4 - 6)  LORazepam     Tablet 0.5 milliGRAM(s) Oral three times a day PRN Anxiety  polyethylene glycol 3350 17 Gram(s) Oral at bedtime PRN Constipation   MEDICATIONS  (STANDING):  ARIPiprazole 10 milliGRAM(s) Oral daily  traZODone 50 milliGRAM(s) Oral at bedtime    MEDICATIONS  (PRN):  acetaminophen     Tablet .. 650 milliGRAM(s) Oral every 6 hours PRN Moderate Pain (4 - 6)  LORazepam     Tablet 0.5 milliGRAM(s) Oral three times a day PRN Anxiety  polyethylene glycol 3350 17 Gram(s) Oral at bedtime PRN Constipation

## 2022-07-17 NOTE — BH INPATIENT PSYCHIATRY ASSESSMENT NOTE - NSBHMETABOLIC_PSY_ALL_CORE_FT
BMI: BMI (kg/m2): 22.1 (07-16-22 @ 21:20)  HbA1c:   Glucose:   BP: 145/84 (07-16-22 @ 21:20) (138/92 - 153/109)  Lipid Panel:  BMI: BMI (kg/m2): 22.1 (07-16-22 @ 21:20)  HbA1c:   Glucose:   BP: 150/89 (07-17-22 @ 08:35) (138/92 - 153/109)  Lipid Panel:

## 2022-07-17 NOTE — BH INPATIENT PSYCHIATRY ASSESSMENT NOTE - NSCOMMENTSUICRISKFACT_PSY_ALL_CORE
Patient carries risk related to acute depressive symptoms, limited social supports and residing alone but is not acutely suicidal and has protective factors of being engaged in treatment, fair insight and judgment into symptoms and illness, and responsibility to family and others.

## 2022-07-17 NOTE — BH INPATIENT PSYCHIATRY ASSESSMENT NOTE - NSACTIVEVENT_PSY_ALL_CORE
Underwent heart surgery March 2022 Underwent heart surgery March 2022/Current or pending social isolation

## 2022-07-17 NOTE — BH INPATIENT PSYCHIATRY ASSESSMENT NOTE - NSBHASSESSSUMMFT_PSY_ALL_CORE
This is a 66-year-old domiciled female with PPHx of depression, self-reported narcissistic and borderline personality disorders, 1PPH in 2012 for depression and received 8 unilateral ECT treatments per patient, no pSA/NSSIB, BIBS for worsening depressive symptoms since a mitral valve relapse surgery in 3/2022 and patient feeling overwhelmed/more anxious having had no prior medical ailments and then requiring a heart surgery this past March. Patient stable and has routine f/u and TTE in the fall of this year, no other PMH. No substance use history.     On initial assessment, patient is anxious and requesting to be discharged with improvement in her symptoms reported after changing Abilify to AM dosing (instead of PM) and this helping her sleep. Patient shares how distressing recent MVP repair was and how she has had difficulty dealing with this despite knowing she is well otherwise at this time and does not require significant medical management for this condition any longer. Suspect characterological components contributory to presentation and possible depressive symptoms (adjustment d/o with depressive and anxiety features versus MDD, r/o BPAD). Patient not acutely suicidal and low imminent risk and had recent medication changes in outpatient setting and has active outpatient care. Reassess tomorrow to see if ongoing mood stability and expand collateral with outpatient providers.     PLAN  -vol admit; FC  -continue Effexor 300mg qd (changed to XR dosing)  -continue Abilify 10mg qd   -continue trazodone 50mg qhs   -lorazepam 0.5mg tid PRN for anxiety (ISTOP reviewed and no c/f misuse at this time)  -expand collateral with outpatient providers    Psychiatrist Dr. Saldaña [phone]  Therapist Marianne Pratt [phone] This is a 66-year-old domiciled female with PPHx of depression, self-reported narcissistic and borderline personality disorders, 1PPH in 2012 for depression and received 8 unilateral ECT treatments per patient, no pSA/NSSIB, BIBS for worsening depressive symptoms since a mitral valve relapse surgery in 3/2022 and patient feeling overwhelmed/more anxious having had no prior medical ailments and then requiring a heart surgery this past March. Patient stable and has routine f/u and TTE in the fall of this year, no other PMH. No substance use history.     On initial assessment, patient is anxious and requesting to be discharged with improvement in her symptoms reported after changing Abilify to AM dosing (instead of PM) and this helping her sleep. Patient shares how distressing recent MVP repair was and how she has had difficulty dealing with this despite knowing she is well otherwise at this time and does not require significant medical management for this condition any longer. Suspect characterological components contributory to presentation and possible depressive symptoms (adjustment d/o with depressive and anxiety features versus MDD, r/o BPAD). Patient not acutely suicidal and low imminent risk and had recent medication changes in outpatient setting and has active outpatient care. Reassess tomorrow to see if ongoing mood stability and expand collateral with outpatient providers.     PEx unremarkable with normal cardiopulm findings, abdominal exam, extremities, and gait wnl. no focal deficits. PMH of mitral valve prolapse surgery 3/2022, no cardiac meds or chronic management required moving forward. Patient not on any other home medications, no allergies to medications.   She is declining lipid panel and A1C and reports recent normal findings on these labs. Reviewed other labs with patient.   No acute medical complaints.    PLAN  -vol admit; FC  -continue Effexor 300mg qd (changed to XR dosing)  -continue Abilify 10mg qd   -continue trazodone 50mg qhs   -lorazepam 0.5mg tid PRN for anxiety (ISTOP reviewed and no c/f misuse at this time)  -expand collateral with outpatient providers    Psychiatrist Dr. Saldaña [phone]  Therapist Marianne Pratt [phone]

## 2022-07-17 NOTE — BH INPATIENT PSYCHIATRY ASSESSMENT NOTE - DESCRIPTION
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 Lives alone in apartment.  from ex- but not formerly . Denies substance use.

## 2022-07-17 NOTE — BH INPATIENT PSYCHIATRY ASSESSMENT NOTE - NSBHCHARTREVIEWVS_PSY_A_CORE FT
Vital Signs Last 24 Hrs  T(C): 37.2 (07-16-22 @ 21:20), Max: 37.2 (07-16-22 @ 21:20)  T(F): 99 (07-16-22 @ 21:20), Max: 99 (07-16-22 @ 21:20)  HR: 112 (07-16-22 @ 21:20) (92 - 120)  BP: 145/84 (07-16-22 @ 21:20) (138/92 - 153/109)  BP(mean): --  RR: 18 (07-16-22 @ 21:20) (18 - 18)  SpO2: 98% (07-16-22 @ 21:20) (95% - 99%)     Vital Signs Last 24 Hrs  T(C): 36.9 (07-17-22 @ 08:35), Max: 37.2 (07-16-22 @ 21:20)  T(F): 98.4 (07-17-22 @ 08:35), Max: 99 (07-16-22 @ 21:20)  HR: 113 (07-17-22 @ 08:35) (92 - 120)  BP: 150/89 (07-17-22 @ 08:35) (138/92 - 153/109)  BP(mean): --  RR: 18 (07-17-22 @ 08:35) (18 - 18)  SpO2: 98% (07-17-22 @ 08:35) (95% - 99%)

## 2022-07-17 NOTE — BH INPATIENT PSYCHIATRY ASSESSMENT NOTE - OTHER PAST PSYCHIATRIC HISTORY (INCLUDE DETAILS REGARDING ONSET, COURSE OF ILLNESS, INPATIENT/OUTPATIENT TREATMENT)
-Current outpatient psychaitrist is Dr. Jamari Saldaña, who she has been seeing for the past 10 years. -Recently connected with outpatient therapist Dr. Marianne Pratt.   -Reports she has been dx with MDD, narcissistic personality disorder, and borderline personality disorder.   -Reports history of one inpatient psychiatric hospitalization ~10 years ago, at which time she received ECT (only time undergoing ECT in lifetime).   -Denies history of suicide attempts.   -Denies history of suicidal ideations but states that she would have intrusive thoughts of driving off of the highway around the time of her inpatient psychiatric hospitalization ~10 years ago (didn't act or felt compelled to act).    -Reports h/o psychotropic trials including current meds of venlafaxine/abilify/trazodone/lorazepam (refer to HPI for details regarding those meds). Reports history of tx w/ brexpiprazole which had activating and unpleasant side effects. Reports history of other psychotropic agents around the time of her hospitalziation / ECT ~10 years ago but doesn't remember their names. Reports history of side effects to those agents at that time but doesn't recall details at the time of this writer's psychiatric evaluation. -Current outpatient psychaitrist is Dr. Jamari Saldaña, who she has been seeing for the past 10 years.   -Recently connected with outpatient therapist Dr. Marianne Pratt; sees once weekly  -Reports she has been dx with MDD, narcissistic personality disorder, and borderline personality disorder.  -Reports history of one inpatient psychiatric hospitalization ~10 years ago, at which time she received ECT (only time undergoing ECT in lifetime).   -Denies history of suicide attempts.   -Denies history of suicidal ideations but states that she would have intrusive thoughts of driving off of the highway around the time of her inpatient psychiatric hospitalization ~10 years ago (didn't act or felt compelled to act).    -Reports h/o psychotropic trials including current meds of venlafaxine/abilify/trazodone/lorazepam (refer to HPI for details regarding those meds). Reports history of tx w/ brexpiprazole which had activating and unpleasant side effects. Reports history of other psychotropic agents around the time of her hospitalziation / ECT ~10 years ago but doesn't remember their names. Reports history of side effects to those agents at that time but doesn't recall details at the time of this writer's psychiatric evaluation.

## 2022-07-17 NOTE — BH INPATIENT PSYCHIATRY ASSESSMENT NOTE - NSHISTORFACTOR_PSY_ALL_CORE
Mother had ECT tx Mother had ECT tx/Family History of psychiatric diagnoses requiring hospitalization

## 2022-07-17 NOTE — BH INPATIENT PSYCHIATRY ASSESSMENT NOTE - PATIENT'S CHIEF COMPLAINT
"I've been feeling more depressed despite the doses going up" "You know, I finally got a good night of sleep and I do not feel as bad."
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
## 2022-07-17 NOTE — BH INPATIENT PSYCHIATRY ASSESSMENT NOTE - NSSUICPROTFACT_PSY_ALL_CORE
Has been engaged in psychiatric outpatient care for years/Positive therapeutic relationships/Sikhism beliefs Has been engaged in psychiatric outpatient care for years/Responsibility to children, family, or others/Identifies reasons for living/Positive therapeutic relationships/Pent[employer]ostal beliefs

## 2022-07-18 PROCEDURE — 99232 SBSQ HOSP IP/OBS MODERATE 35: CPT

## 2022-07-18 PROCEDURE — 90853 GROUP PSYCHOTHERAPY: CPT

## 2022-07-18 RX ORDER — IBUPROFEN 200 MG
200 TABLET ORAL EVERY 6 HOURS
Refills: 0 | Status: DISCONTINUED | OUTPATIENT
Start: 2022-07-18 | End: 2022-07-28

## 2022-07-18 RX ADMIN — Medication 50 MILLIGRAM(S): at 21:41

## 2022-07-18 RX ADMIN — Medication 200 MILLIGRAM(S): at 21:00

## 2022-07-18 RX ADMIN — ARIPIPRAZOLE 10 MILLIGRAM(S): 15 TABLET ORAL at 09:37

## 2022-07-18 RX ADMIN — Medication 0.5 MILLIGRAM(S): at 09:37

## 2022-07-18 RX ADMIN — Medication 0.5 MILLIGRAM(S): at 16:02

## 2022-07-18 RX ADMIN — Medication 200 MILLIGRAM(S): at 20:14

## 2022-07-18 RX ADMIN — Medication 300 MILLIGRAM(S): at 09:38

## 2022-07-18 NOTE — BH INPATIENT PSYCHIATRY PROGRESS NOTE - NSBHASSESSSUMMFT_PSY_ALL_CORE
This is a 66-year-old domiciled female with PPHx of depression, self-reported narcissistic and borderline personality disorders, 1PPH in 2012 for depression and received 8 unilateral ECT treatments per patient, no pSA/NSSIB, BIBS for worsening depressive symptoms since a mitral valve relapse surgery in 3/2022 and patient feeling overwhelmed/more anxious having had no prior medical ailments and then requiring a heart surgery this past March. Patient stable and has routine f/u and TTE in the fall of this year, no other PMH. No substance use history.     On initial assessment, patient is anxious and requesting to be discharged with improvement in her symptoms reported after changing Abilify to AM dosing (instead of PM) and this helping her sleep. Patient shares how distressing recent MVP repair was and how she has had difficulty dealing with this despite knowing she is well otherwise at this time and does not require significant medical management for this condition any longer. Suspect characterological components contributory to presentation and possible depressive symptoms (adjustment d/o with depressive and anxiety features versus MDD, r/o BPAD). Patient not acutely suicidal and low imminent risk and had recent medication changes in outpatient setting and has active outpatient care. Reassess tomorrow to see if ongoing mood stability and expand collateral with outpatient providers.     -07/18: patient feels better, but still endorses depressive symptoms. Denies SI/HI and AH/VH. She said she had a good response to ECT in the past and wants do pursue this treatment option. Will start referral process.

## 2022-07-18 NOTE — BH SOCIAL WORK INITIAL PSYCHOSOCIAL EVALUATION - OTHER PAST PSYCHIATRIC HISTORY (INCLUDE DETAILS REGARDING ONSET, COURSE OF ILLNESS, INPATIENT/OUTPATIENT TREATMENT)
Per chart, PPHx of depression, narcissistic and borderline personality disorders, 1PPH in 2012 for depression and received 8 unilateral ECT treatments per patient, no pSA/NSSIB, BIBS for worsening depressive symptoms since a mitral valve relapse surgery in 3/2022 and patient feeling overwhelmed/more anxious having had no prior medical ailments and then requiring a heart surgery this past March.

## 2022-07-18 NOTE — BH CHART NOTE - NSEVENTNOTEFT_PSY_ALL_CORE
Event Note	PHYSICAL EXAM: Agreed     Temp: 99 Degrees F/ 37.2 Degrees C  Heart Rate Heart Rate (beats/min): 112 /min  BP: 145/84 mm Hg  Respiration Rate (breaths/min): 18 /min  SpO2 (%): 98 %    GENERAL: NAD, comfortable, ambulating  HEAD:  Atraumatic, Normocephalic  EYES: EOMI, PERRLA, conjunctiva non-injected, no scleral icterus  ENT: Moist mucous membranes  NECK: Supple, No JVD, no bruits  CHEST/LUNG: Clear to auscultation bilaterally; No rales, rhonchi, wheezing, or rubs. Unlabored respirations  HEART: Regular rate and rhythm; no murmurs, rubs, or gallops, S1 and S2 normal; no S3 or S4; PMI at mid-clavicular line in the 5th intercostal space;   ABDOMEN: bowel sounds active; abdomen soft on palpation, non-tender to palpation light or deep palpation, no rebound tenderness; no scars   EXTREMITIES:  No clubbing, cyanosis, or edema in the limbs  NERVOUS SYSTEM:  A&Ox3, no focal deficits; CN II-XII intact  SKIN: No rashes or lesions visible on the extremities; 4 scars on the right anterior axillary line
PDMP REFERENCE NOTE  ISTOP Ref# [id number]  Patient Name: Balbina Friedman  YOB: 1955  Address: [address] [place] [address]  Sex: Female    Rx Written	Rx Dispensed	Drug	Quantity	Days Supply	Prescriber Name	Prescriber Deonna #	Payment Method	Dispenser  05/09/2022	05/11/2022	lorazepam 0.5 mg tablet	45	15	Jamari Saldaña MD PHD	[id number]	Medicare	[id number]  04/21/2022	04/23/2022	lorazepam 0.5 mg tablet	45	15	Jamari Saldaña MD PHD	[id number]	Medicare	[id number]  02/27/2022	02/27/2022	lorazepam 0.5 mg tablet	10	10	Jamari Saldaña MD PHD	[id number]	Medicare	[id number]    Rx Written	Rx Dispensed	Drug	Quantity	Days Supply	Prescriber Name	Prescriber Deonna #	Payment Method	Dispenser  03/06/2022	03/06/2022	hydromorphone 2 mg tablet	20	5	Sultana Campbell A (NP)	[id number]	Other	[facility], [facility]

## 2022-07-18 NOTE — BH SOCIAL WORK INITIAL PSYCHOSOCIAL EVALUATION - DETAILS
Pt reports that her parents (both ) had mental health concerns. She did not provide further details.   Per chart, pt reports that her mother received ECT.

## 2022-07-18 NOTE — BH SOCIAL WORK INITIAL PSYCHOSOCIAL EVALUATION - PUBLIC BENEFITS
Rosalba Henry     This post procedure note has been dictated. Full report in imaging tab.    Drew Willson MD  1/7/2020 3:10 PM     no

## 2022-07-18 NOTE — BH PSYCHOLOGY - GROUP THERAPY NOTE - TOKEN PULL-DIAGNOSIS
Primary Diagnosis:  Depression, recurrent [F33.9]        Problem Dx:   Severe episode of recurrent major depressive disorder, without psychotic features [F33.2]

## 2022-07-18 NOTE — BH PSYCHOLOGY - GROUP THERAPY NOTE - NSPSYCHOLGRPGENPT_PSY_A_CORE FT
APPEARANCE:  [X] adequately groomed [] disheveled [] malodorous [] Other:   BEHAVIOR: [X] cooperative [] uncooperative [] good EC [] poor EC [] well related [] oddly related [] guarded []PMA [] PMR []abnormal movements [] Other:   SPEED: [X] normal rate/rhythm/volume [] loud [] quiet [] slow [] rapid [] pressured [] Other: _________   MOOD: [] euthymic [] dysphoric [X] anxious [] irritable [] Other: ___________   AFFECT: [X] full [] expansive [] constricted [] blunted [] flat [] stable [] labile [] Other:   THOUGHT PROCESS: [] organized [] disorganized [X] goal-directed [] concrete [] logical [] illogical   [] circumstantial [] tangential [] impoverished [] effusive [] repetitive [] Other:  THOUGHT CONTENT: [X] negative for delusions/suicidal ideation /homicidal ideation [] positive for delusions/suicidal ideation/homicidal ideation Describe:   PERCEPTION: [X] negative for auditory/ visual hallucinations [] positive for auditory/ visual hallucinations Describe:   INSIGHT/JUDGMENT: [X] good []fair [] poor        IMPULSE CONTROL: [X] good []fair [] poor     COGNITION: [X] alert and oriented to person, time, place [] Lacks orientation to person/time/place.  Risk Issued Addressed: none elicited in group

## 2022-07-18 NOTE — BH INPATIENT PSYCHIATRY PROGRESS NOTE - NSBHATTESTBILLINGAW_PSY_A_CORE
99223-Initial hospital care - high complexity 11781-Dlvsuacydy Inpatient care - moderate complexity - 25 minutes

## 2022-07-18 NOTE — BH SOCIAL WORK INITIAL PSYCHOSOCIAL EVALUATION - NSBHHOUSECOMMENTFT_PSY_ALL_CORE
Pt reports that she resides alone in her apartment. Per chart, her address is: [address] [place] [address].

## 2022-07-18 NOTE — BH SOCIAL WORK INITIAL PSYCHOSOCIAL EVALUATION - NSCMSPTSTRENGTHS_PSY_ALL_CORE
Expressive of emotions/Lauren/spirituality/Financial stability/Future/goal oriented/Leisure interest

## 2022-07-18 NOTE — BH INPATIENT PSYCHIATRY PROGRESS NOTE - PRN MEDS
MEDICATIONS  (PRN):  acetaminophen     Tablet .. 650 milliGRAM(s) Oral every 6 hours PRN Moderate Pain (4 - 6)  LORazepam     Tablet 0.5 milliGRAM(s) Oral three times a day PRN Anxiety  polyethylene glycol 3350 17 Gram(s) Oral at bedtime PRN Constipation   MEDICATIONS  (PRN):  acetaminophen     Tablet .. 650 milliGRAM(s) Oral every 6 hours PRN Moderate Pain (4 - 6)  ibuprofen  Tablet. 200 milliGRAM(s) Oral every 6 hours PRN back pain  LORazepam     Tablet 0.5 milliGRAM(s) Oral three times a day PRN Anxiety  polyethylene glycol 3350 17 Gram(s) Oral at bedtime PRN Constipation

## 2022-07-18 NOTE — BH PSYCHOLOGY - GROUP THERAPY NOTE - NSBHPSYCHOLPARTICIPCOMMENT_PSY_A_CORE FT
ariel spoke about self-esteem issues, and maladaptive patterns in longstanding relationships. She made meaningful connections with other group members, and provided constructive feedback.

## 2022-07-18 NOTE — BH INPATIENT PSYCHIATRY PROGRESS NOTE - NSBHFUPINTERVALHXFT_PSY_A_CORE
Patient was interviewed by the inpatient unit team in person today. She reports that her most important complaint is "having lost interest in things". She reports that she was in [place] recently and she lost interest in playing tennis, reading and attending her [employer]. This symptoms started about 3 weeks ago. She says that she has "classical symptoms of depression". She also reports increased sleep and a feeling of "lack of self worth". She disclose some of her thoughts: she ruinates about not having had kids and believing that her life did not work out. She reports that she first became depressed 10 years ago and responded well to Efexor and a course of ECT. This time, however, she was prescribed Brintellix and said that she had adverse effects ("things spinning around). She denied ever having manic symptoms but reported that her first episode of depression was "agitated depression", but that is not the case now. She denies irritability. She denies ever having SI or suicidal behaviors. Denies HI. Reports mother having MDD. She lives alone, is  and has no children. She says she is healthy and her only relevant medical problem is mitral valve prolapse, reporting that she had surgery for this problem.

## 2022-07-18 NOTE — BH INPATIENT PSYCHIATRY PROGRESS NOTE - NSBHCHARTREVIEWVS_PSY_A_CORE FT
Vital Signs Last 24 Hrs  T(C): 37.2 (07-18-22 @ 09:00), Max: 37.2 (07-18-22 @ 09:00)  T(F): 98.9 (07-18-22 @ 09:00), Max: 98.9 (07-18-22 @ 09:00)  HR: 120 (07-18-22 @ 09:00) (107 - 120)  BP: 153/94 (07-18-22 @ 09:00) (153/94 - 154/97)  BP(mean): --  RR: 18 (07-18-22 @ 09:00) (18 - 18)  SpO2: 97% (07-18-22 @ 09:00) (97% - 98%)     Vital Signs Last 24 Hrs  T(C): 36.9 (07-18-22 @ 16:38), Max: 37.2 (07-18-22 @ 09:00)  T(F): 98.5 (07-18-22 @ 16:38), Max: 98.9 (07-18-22 @ 09:00)  HR: 104 (07-18-22 @ 16:38) (104 - 120)  BP: 168/103 (07-18-22 @ 16:38) (153/94 - 168/103)  BP(mean): --  RR: 18 (07-18-22 @ 16:38) (18 - 18)  SpO2: 97% (07-18-22 @ 16:38) (97% - 97%)

## 2022-07-18 NOTE — BH SOCIAL WORK INITIAL PSYCHOSOCIAL EVALUATION - LIVING ENVIRONMENT
Hi Dr. Dianelys Powers,    Please see pt's response below answering your question that was asked today regarding her diabetic medications. Thanks. no

## 2022-07-18 NOTE — BH INPATIENT PSYCHIATRY PROGRESS NOTE - NSBHATTESTCOMMENTATTENDFT_PSY_A_CORE
Recommendations: Prepare patient for ECT; med consult to clear patient;  Brief hx:66-year-old domiciled female with PPHx of depression, self-reported narcissistic and borderline personality disorders, 1PPH in 2012 for depression and received 8 unilateral ECT treatments per patient, no pSA/NSSIB, BIBS for worsening depressive symptoms since a mitral valve relapse surgery in 3/2022 and patient feeling overwhelmed/more anxious having had no prior medical ailments and then requiring a heart surgery this past March. Patient stable and has routine f/u and TTE in the fall of this year, no other PMH. No substance use history.    ---xyx  ;;07/18: Fund of knowledge intact; registers and recalls 3/3;  oriented x 3; recalls past events; alert; oriented x3; anxious and sad but Not endorsing  suicidal or homicidal ideation intent or plans; no mention of auditory or visual hallucinations Thinking is congruent with affect; no peculiarities of thinking or language use. Fair to good eye contact; speech clear spontaneous and normal volume

## 2022-07-18 NOTE — BH INPATIENT PSYCHIATRY PROGRESS NOTE - CURRENT MEDICATION
MEDICATIONS  (STANDING):  ARIPiprazole 10 milliGRAM(s) Oral daily  traZODone 50 milliGRAM(s) Oral at bedtime  venlafaxine  milliGRAM(s) Oral daily    MEDICATIONS  (PRN):  acetaminophen     Tablet .. 650 milliGRAM(s) Oral every 6 hours PRN Moderate Pain (4 - 6)  LORazepam     Tablet 0.5 milliGRAM(s) Oral three times a day PRN Anxiety  polyethylene glycol 3350 17 Gram(s) Oral at bedtime PRN Constipation   MEDICATIONS  (STANDING):  ARIPiprazole 10 milliGRAM(s) Oral daily  traZODone 50 milliGRAM(s) Oral at bedtime  venlafaxine  milliGRAM(s) Oral daily    MEDICATIONS  (PRN):  acetaminophen     Tablet .. 650 milliGRAM(s) Oral every 6 hours PRN Moderate Pain (4 - 6)  ibuprofen  Tablet. 200 milliGRAM(s) Oral every 6 hours PRN back pain  LORazepam     Tablet 0.5 milliGRAM(s) Oral three times a day PRN Anxiety  polyethylene glycol 3350 17 Gram(s) Oral at bedtime PRN Constipation

## 2022-07-19 LAB
A1C WITH ESTIMATED AVERAGE GLUCOSE RESULT: 5.5 % — SIGNIFICANT CHANGE UP (ref 4–5.6)
ALBUMIN SERPL ELPH-MCNC: 4.5 G/DL — SIGNIFICANT CHANGE UP (ref 3.3–5)
ALP SERPL-CCNC: 77 U/L — SIGNIFICANT CHANGE UP (ref 40–120)
ALT FLD-CCNC: 19 U/L — SIGNIFICANT CHANGE UP (ref 10–45)
ANION GAP SERPL CALC-SCNC: 9 MMOL/L — SIGNIFICANT CHANGE UP (ref 5–17)
APTT BLD: 28.8 SEC — SIGNIFICANT CHANGE UP (ref 27.5–35.5)
AST SERPL-CCNC: 18 U/L — SIGNIFICANT CHANGE UP (ref 10–40)
BILIRUB SERPL-MCNC: 0.2 MG/DL — SIGNIFICANT CHANGE UP (ref 0.2–1.2)
BUN SERPL-MCNC: 11 MG/DL — SIGNIFICANT CHANGE UP (ref 7–23)
CALCIUM SERPL-MCNC: 9.6 MG/DL — SIGNIFICANT CHANGE UP (ref 8.4–10.5)
CHLORIDE SERPL-SCNC: 94 MMOL/L — LOW (ref 96–108)
CHOLEST SERPL-MCNC: 202 MG/DL — HIGH
CO2 SERPL-SCNC: 29 MMOL/L — SIGNIFICANT CHANGE UP (ref 22–31)
CREAT SERPL-MCNC: 0.75 MG/DL — SIGNIFICANT CHANGE UP (ref 0.5–1.3)
EGFR: 88 ML/MIN/1.73M2 — SIGNIFICANT CHANGE UP
ESTIMATED AVERAGE GLUCOSE: 111 MG/DL — SIGNIFICANT CHANGE UP (ref 68–114)
GLUCOSE SERPL-MCNC: 94 MG/DL — SIGNIFICANT CHANGE UP (ref 70–99)
HCT VFR BLD CALC: 41.6 % — SIGNIFICANT CHANGE UP (ref 34.5–45)
HDLC SERPL-MCNC: 85 MG/DL — SIGNIFICANT CHANGE UP
HGB BLD-MCNC: 14.1 G/DL — SIGNIFICANT CHANGE UP (ref 11.5–15.5)
INR BLD: 0.9 — SIGNIFICANT CHANGE UP (ref 0.88–1.16)
LIPID PNL WITH DIRECT LDL SERPL: 96 MG/DL — SIGNIFICANT CHANGE UP
MCHC RBC-ENTMCNC: 30.6 PG — SIGNIFICANT CHANGE UP (ref 27–34)
MCHC RBC-ENTMCNC: 33.9 GM/DL — SIGNIFICANT CHANGE UP (ref 32–36)
MCV RBC AUTO: 90.2 FL — SIGNIFICANT CHANGE UP (ref 80–100)
NON HDL CHOLESTEROL: 117 MG/DL — SIGNIFICANT CHANGE UP
NRBC # BLD: 0 /100 WBCS — SIGNIFICANT CHANGE UP (ref 0–0)
PLATELET # BLD AUTO: 319 K/UL — SIGNIFICANT CHANGE UP (ref 150–400)
POTASSIUM SERPL-MCNC: 4.3 MMOL/L — SIGNIFICANT CHANGE UP (ref 3.5–5.3)
POTASSIUM SERPL-SCNC: 4.3 MMOL/L — SIGNIFICANT CHANGE UP (ref 3.5–5.3)
PROT SERPL-MCNC: 6.9 G/DL — SIGNIFICANT CHANGE UP (ref 6–8.3)
PROTHROM AB SERPL-ACNC: 10.7 SEC — SIGNIFICANT CHANGE UP (ref 10.5–13.4)
RBC # BLD: 4.61 M/UL — SIGNIFICANT CHANGE UP (ref 3.8–5.2)
RBC # FLD: 12.9 % — SIGNIFICANT CHANGE UP (ref 10.3–14.5)
SODIUM SERPL-SCNC: 132 MMOL/L — LOW (ref 135–145)
TRIGL SERPL-MCNC: 106 MG/DL — SIGNIFICANT CHANGE UP
WBC # BLD: 12.44 K/UL — HIGH (ref 3.8–10.5)
WBC # FLD AUTO: 12.44 K/UL — HIGH (ref 3.8–10.5)

## 2022-07-19 PROCEDURE — 99221 1ST HOSP IP/OBS SF/LOW 40: CPT

## 2022-07-19 PROCEDURE — 90832 PSYTX W PT 30 MINUTES: CPT

## 2022-07-19 RX ADMIN — Medication 200 MILLIGRAM(S): at 06:32

## 2022-07-19 RX ADMIN — Medication 50 MILLIGRAM(S): at 22:05

## 2022-07-19 RX ADMIN — ARIPIPRAZOLE 10 MILLIGRAM(S): 15 TABLET ORAL at 09:56

## 2022-07-19 RX ADMIN — Medication 0.5 MILLIGRAM(S): at 17:25

## 2022-07-19 RX ADMIN — Medication 200 MILLIGRAM(S): at 09:59

## 2022-07-19 RX ADMIN — Medication 300 MILLIGRAM(S): at 09:56

## 2022-07-19 RX ADMIN — Medication 0.5 MILLIGRAM(S): at 09:56

## 2022-07-19 NOTE — BH PSYCHOLOGY - CLINICIAN PSYCHOTHERAPY NOTE - NSBHPSYCHOLNARRATIVE_PSY_A_CORE FT
APPEARANCE:  [x] adequately groomed []disheveled  [] malodorous [] Other:     BEHAVIOR: [x] cooperative [] uncooperative [x] good EC [] poor EC [x] well related [] oddly related [] guarded []PMA [] PMR []abnormal movements [] Other: _____________     SPEED: [x] normal rate/rhythm/volume [] loud [] quiet [] slow  [] rapid [] pressured [] Other: _________   MOOD: [] euthymic []x dysphoric []anxious [] irritable [] Other: ___________   AFFECT: [] full [] expansive [] constricted x[] blunted [] flat [] stable [] labile [] Other: _________________ THOUGHT PROCESS: [x] organized [] disorganized [] goal-directed [] concrete [] logical  [] illogical   [] circumstantial [] tangential [] impoverished [] effusive [] repetitive [] Other: ___________   THOUGHT CONTENT: [] negative for delusions/suicidal ideation /homicidal ideation  [x] positive for delusions/suicidal ideation/homicidal ideation Describe: denied active SI, but reported that she cannot live this way  PERCEPTION: [x] negative for auditory/ visual hallucinations  [] positive for auditory/ visual hallucinations Describe: ____________________________________________________________   INSIGHT/JUDGMENT: [] good [x]fair [] poor        IMPULSE CONTROL: [x] good []fair [] poor         COGNITION: [x] alert and oriented to person,time,place    Lacks orientation to person/ time/ place. Describe: _______________________    Ms. Friedman is a 66 year old,  but  and single F, domiciled, past medical history of surgically repaired MVP in March 2022, past psychiatric history of narcissistic & borderline personality disorders, MDD, 1 prior inpatient psychiatric hospitalization & ECT ~10 years ago for depression, no history of suicide attempts or self-harm, presenting for worsening depressive symptoms since March 2022 which have not responded to outpatient treatment and medication management.   Ms. Friedman reported that she had been feeling depressed since she had the heart surgery, and was starting to have thoughts of not wanting to be alive, having anxiety, anhedonia, not enjoying anything she is doing, so she came to the hospital to get ECT. Discussed that she wants to get ECT tomorrow, or not sure how she can bear the depression.  from  7 yrs ago, and at this point they have a "platonic relationship". She reported that she finds herself to be much more focused on the past then usual, regretful of her choices. She also discussed that she had been having trouble sleeping because of worries. Denied active suicide plan, but reported that she cannot live the way she has been feeling, expressing some level of agitation, and unable to even attend groups, and her urge is to sleep only. Eager to get ECT, which was helpful for her 10 yrs ago. Discussed that she was also feeling more depressed last yr, and that is why she started to see a therapist that she has a good alliance with.

## 2022-07-19 NOTE — BH INPATIENT PSYCHIATRY PROGRESS NOTE - PRN MEDS
MEDICATIONS  (PRN):  acetaminophen     Tablet .. 650 milliGRAM(s) Oral every 6 hours PRN Moderate Pain (4 - 6)  ibuprofen  Tablet. 200 milliGRAM(s) Oral every 6 hours PRN back pain  LORazepam     Tablet 0.5 milliGRAM(s) Oral three times a day PRN Anxiety  polyethylene glycol 3350 17 Gram(s) Oral at bedtime PRN Constipation

## 2022-07-19 NOTE — BH INPATIENT PSYCHIATRY PROGRESS NOTE - NSBHATTESTCOMMENTATTENDFT_PSY_A_CORE
Recommendations: Prepare patient for ECT; med consult to clear patient;  Brief hx:66-year-old domiciled female with PPHx of depression, self-reported narcissistic and borderline personality disorders, 1PPH in 2012 for depression and received 8 unilateral ECT treatments per patient, no pSA/NSSIB, BIBS for worsening depressive symptoms since a mitral valve relapse surgery in 3/2022 and patient feeling overwhelmed/more anxious having had no prior medical ailments and then requiring a heart surgery this past March. Patient stable and has routine f/u and TTE in the fall of this year, no other PMH. No substance use history.    ---xyx  ;;07/18: Fund of knowledge intact; registers and recalls 3/3;  oriented x 3; recalls past events; alert; oriented x3; anxious and sad but Not endorsing  suicidal or homicidal ideation intent or plans; no mention of auditory or visual hallucinations Thinking is congruent with affect; no peculiarities of thinking or language use. Fair to good eye contact; speech clear spontaneous and normal volume    Recommendations: Prepare patient for ECT; med consult to clear patient;    Brief hx:66-year-old domiciled female with PPHx of depression, self-reported narcissistic and borderline personality disorders, 1PPH in 2012 for depression and received 8 unilateral ECT treatments per patient, no pSA/NSSIB, BIBS for worsening depressive symptoms since a mitral valve relapse surgery in 3/2022 and patient feeling overwhelmed/more anxious having had no prior medical ailments and then requiring a heart surgery this past March. Patient stable and has routine f/u and TTE in the fall of this year, no other PMH. No substance use history.    ---xyx  ;;07/19: continue to endorse negative thinking; hopelessness; focus on ECT to help her distress; Alert; oriented; cognition intact; speech clear; no tremor or evidence of movement impairment. Not endorsing  suicidal or homicidal ideation intent or plans; no mention of auditory or visual hallucinations .  awaiting medical clearance; MOCA 23; consented for ECT

## 2022-07-19 NOTE — BH INPATIENT PSYCHIATRY PROGRESS NOTE - CURRENT MEDICATION
MEDICATIONS  (STANDING):  ARIPiprazole 10 milliGRAM(s) Oral daily  traZODone 50 milliGRAM(s) Oral at bedtime  venlafaxine  milliGRAM(s) Oral daily    MEDICATIONS  (PRN):  acetaminophen     Tablet .. 650 milliGRAM(s) Oral every 6 hours PRN Moderate Pain (4 - 6)  ibuprofen  Tablet. 200 milliGRAM(s) Oral every 6 hours PRN back pain  LORazepam     Tablet 0.5 milliGRAM(s) Oral three times a day PRN Anxiety  polyethylene glycol 3350 17 Gram(s) Oral at bedtime PRN Constipation

## 2022-07-19 NOTE — CONSULT NOTE ADULT - ASSESSMENT
66F, domiciled, [facility] MVP (s/p surgical repair in March 2022), past psychiatric history of narcissistic & borderline personality disorders, and MDD  presenting for worsening depressive symptoms since March 2022 which have not responded to outpatient treatment and medication management. Medicine consulted for medical clearance for ECT in am     #Preop clearance      66F, domiciled, [facility] MVP (s/p surgical repair in March 2022), past psychiatric history of narcissistic & borderline personality disorders, and MDD  presenting for worsening depressive symptoms since March 2022 which have not responded to outpatient treatment and medication management. Medicine consulted for medical clearance for ECT in am     #Preop clearance   METs>4. RCR class I. Poon score 0. Pt is medically optimized for low risk procedure and no additional cardiac w/up indicated as long as preop EKG and Labs wnl.   -please obtain EKG  -preop labs: CBC, CMP, Magnesium, coags   -If EKG wnl and preop labs wnl pt is optimized for ECT     See attending attestation for final recs

## 2022-07-19 NOTE — CONSULT NOTE ADULT - SUBJECTIVE AND OBJECTIVE BOX
66F, domiciled, PMH MVP (s/p surgical repair in March 2022), past psychiatric history of narcissistic & borderline personality disorders, and MDD  presenting for worsening depressive symptoms since March 2022 which have not responded to outpatient treatment and medication management. Medicine consulted for medical clearance for ECT in am     Additional Hx:     Surg Hx:   Social Hx:   Allergies:   Meds:          VITAL SIGNS:  T(F): 97.9 (07-19-22 @ 09:00)  HR: 115 (07-19-22 @ 09:00)  BP: 146/94 (07-19-22 @ 09:00)  RR: 17 (07-19-22 @ 09:00)  SpO2: 97% (07-19-22 @ 09:00)  Wt(kg): --        PHYSICAL EXAM:    Constitutional:  NAD  HEENT:  MMM  Respiratory: CTA B/L; no W/R/R   Cardiac: +S1/S2; RRR; no M/R/G   Gastrointestinal: soft, NT/ND; no rebound or guarding; +BSx4  Extremities: WWP, no clubbing or cyanosis; no peripheral edema  Vascular: 2+ radial, DP/PT pulses B/L  Neurologic: AAOx3     MEDICATIONS  (STANDING):  ARIPiprazole 10 milliGRAM(s) Oral daily  traZODone 50 milliGRAM(s) Oral at bedtime  venlafaxine  milliGRAM(s) Oral daily    MEDICATIONS  (PRN):  acetaminophen     Tablet .. 650 milliGRAM(s) Oral every 6 hours PRN Moderate Pain (4 - 6)  ibuprofen  Tablet. 200 milliGRAM(s) Oral every 6 hours PRN back pain  LORazepam     Tablet 0.5 milliGRAM(s) Oral three times a day PRN Anxiety  polyethylene glycol 3350 17 Gram(s) Oral at bedtime PRN Constipation      Allergies    No Known Allergies    Intolerances        LABS:                RADIOLOGY & ADDITIONAL TESTS:  Reviewed     66F, domiciled, PMH MVP (s/p surgical repair at [facility] in March 2022), past psychiatric history of narcissistic & borderline personality disorders, and MDD presenting for worsening depressive symptoms since March 2022 which have not responded to outpatient treatment and medication management. Medicine consulted for medical clearance for ECT in am     Additional Hx: Pt seen at bedside. Reports feeling well and has no active complaints. Denies f/c, HA, CP, SOB, n/v/d/c, abd pain and dysuria. Able to walk 10+ blocks and 3-4 flights of stairs w/out any discomfort.      Surg Hx: MVP repair   Social Hx: Denies toxic habbits   Allergies: NKDA   Home Medications: Venlafaxin, Abilify and Trazadone         VITAL SIGNS:  T(F): 97.9 (07-19-22 @ 09:00)  HR: 115 (07-19-22 @ 09:00)  BP: 146/94 (07-19-22 @ 09:00)  RR: 17 (07-19-22 @ 09:00)  SpO2: 97% (07-19-22 @ 09:00)  Wt(kg): --        PHYSICAL EXAM:    Constitutional:  NAD  HEENT:  MMM  Respiratory: CTA B/L; no W/R/R   Cardiac: +S1/S2; RRR; no M/R/G   Gastrointestinal: soft, NT/ND; no rebound or guarding; +BSx4  Extremities: WWP, no clubbing or cyanosis; no peripheral edema  Vascular: 2+ radial, DP/PT pulses B/L  Neurologic: AAOx3     MEDICATIONS  (STANDING):  ARIPiprazole 10 milliGRAM(s) Oral daily  traZODone 50 milliGRAM(s) Oral at bedtime  venlafaxine  milliGRAM(s) Oral daily    MEDICATIONS  (PRN):  acetaminophen     Tablet .. 650 milliGRAM(s) Oral every 6 hours PRN Moderate Pain (4 - 6)  ibuprofen  Tablet. 200 milliGRAM(s) Oral every 6 hours PRN back pain  LORazepam     Tablet 0.5 milliGRAM(s) Oral three times a day PRN Anxiety  polyethylene glycol 3350 17 Gram(s) Oral at bedtime PRN Constipation      Allergies    No Known Allergies    Intolerances        LABS:                RADIOLOGY & ADDITIONAL TESTS:  Reviewed     66F, domiciled, PMH MVP (s/p surgical repair at [facility] in March 2022), past psychiatric history of narcissistic & borderline personality disorders, and MDD presenting for worsening depressive symptoms since March 2022 which have not responded to outpatient treatment and medication management. Medicine consulted for medical clearance for ECT in am     Additional Hx: Pt seen at bedside. Reports feeling well and has no active complaints. Denies f/c, HA, CP, SOB, n/v/d/c, abd pain and dysuria. Able to walk 10+ blocks and 3-4 flights of stairs w/out any discomfort.      Surg Hx: MVP repair   Social Hx: Denies toxic habbits   Allergies: NKDA   Home Medications: Abilify and Trazadone         VITAL SIGNS:  T(F): 97.9 (07-19-22 @ 09:00)  HR: 115 (07-19-22 @ 09:00)  BP: 146/94 (07-19-22 @ 09:00)  RR: 17 (07-19-22 @ 09:00)  SpO2: 97% (07-19-22 @ 09:00)  Wt(kg): --        PHYSICAL EXAM:    Constitutional:  NAD  HEENT:  MMM  Respiratory: CTA B/L; no W/R/R   Cardiac: +S1/S2; RRR; no M/R/G   Gastrointestinal: soft, NT/ND; no rebound or guarding; +BSx4  Extremities: WWP, no clubbing or cyanosis; no peripheral edema  Vascular: 2+ radial, DP/PT pulses B/L  Neurologic: AAOx3     MEDICATIONS  (STANDING):  ARIPiprazole 10 milliGRAM(s) Oral daily  traZODone 50 milliGRAM(s) Oral at bedtime  venlafaxine  milliGRAM(s) Oral daily    MEDICATIONS  (PRN):  acetaminophen     Tablet .. 650 milliGRAM(s) Oral every 6 hours PRN Moderate Pain (4 - 6)  ibuprofen  Tablet. 200 milliGRAM(s) Oral every 6 hours PRN back pain  LORazepam     Tablet 0.5 milliGRAM(s) Oral three times a day PRN Anxiety  polyethylene glycol 3350 17 Gram(s) Oral at bedtime PRN Constipation      Allergies    No Known Allergies    Intolerances        LABS:                RADIOLOGY & ADDITIONAL TESTS:  Reviewed

## 2022-07-19 NOTE — CONSULT NOTE ADULT - ATTENDING COMMENTS
Assessment: Ms. Friedman is a 66 year old female with a PMHx of MVP (s/p surgical repair at [facility], 3/2022), narcissistic/borderline personality disorders, and MDD presented with worsening depression refractory to outpatient treatment.  Patient is planned for ECT.  Medicine is consulted for medical clearance.     Recommendations:   -RCRI: 0, class 1 risk, 3.9% risk of death, MI or cardiac arrest  -Poon: 0  -METS > 4  -agree with obtaining, CBC, CMP, Mg, coags and EKG  -if EKG is within normal limits, patient is medically optimized for ECT   -patient is low risk for a low risk procedure

## 2022-07-19 NOTE — BH INPATIENT PSYCHIATRY PROGRESS NOTE - NSBHCHARTREVIEWVS_PSY_A_CORE FT
Vital Signs Last 24 Hrs  T(C): 36.6 (07-19-22 @ 09:00), Max: 37.2 (07-19-22 @ 06:12)  T(F): 97.9 (07-19-22 @ 09:00), Max: 98.9 (07-19-22 @ 06:12)  HR: 115 (07-19-22 @ 09:00) (90 - 115)  BP: 146/94 (07-19-22 @ 09:00) (120/78 - 146/94)  BP(mean): --  RR: 17 (07-19-22 @ 09:00) (17 - 18)  SpO2: 97% (07-19-22 @ 09:00) (95% - 97%)     Vital Signs Last 24 Hrs  T(C): 37.2 (07-19-22 @ 17:22), Max: 37.2 (07-19-22 @ 17:22)  T(F): 99 (07-19-22 @ 17:22), Max: 99 (07-19-22 @ 17:22)  HR: --  BP: 126/84 (07-19-22 @ 17:22) (126/84 - 126/84)  BP(mean): --  RR: --  SpO2: 99% (07-19-22 @ 17:22) (99% - 99%)

## 2022-07-19 NOTE — BH INPATIENT PSYCHIATRY PROGRESS NOTE - NSBHMETABOLIC_PSY_ALL_CORE_FT
BMI: BMI (kg/m2): 22.1 (07-16-22 @ 21:20)  HbA1c: A1C with Estimated Average Glucose Result: 5.5 % (07-19-22 @ 07:40)    Glucose:   BP: 146/94 (07-19-22 @ 09:00) (120/78 - 168/103)  Lipid Panel: Date/Time: 07-19-22 @ 07:40  Cholesterol, Serum: 202  Direct LDL: --  HDL Cholesterol, Serum: 85  Total Cholesterol/HDL Ration Measurement: --  Triglycerides, Serum: 106   BMI: BMI (kg/m2): 22.1 (07-16-22 @ 21:20)  HbA1c: A1C with Estimated Average Glucose Result: 5.5 % (07-19-22 @ 07:40)    Glucose:   BP: 126/84 (07-19-22 @ 17:22) (120/78 - 168/103)  Lipid Panel: Date/Time: 07-19-22 @ 07:40  Cholesterol, Serum: 202  Direct LDL: --  HDL Cholesterol, Serum: 85  Total Cholesterol/HDL Ration Measurement: --  Triglycerides, Serum: 106

## 2022-07-19 NOTE — BH INPATIENT PSYCHIATRY PROGRESS NOTE - NSBHASSESSSUMMFT_PSY_ALL_CORE
This is a 66-year-old domiciled female with PPHx of depression, self-reported narcissistic and borderline personality disorders, 1PPH in 2012 for depression and received 8 unilateral ECT treatments per patient, no pSA/NSSIB, BIBS for worsening depressive symptoms since a mitral valve relapse surgery in 3/2022 and patient feeling overwhelmed/more anxious having had no prior medical ailments and then requiring a heart surgery this past March. Patient stable and has routine f/u and TTE in the fall of this year, no other PMH. No substance use history.     On initial assessment, patient is anxious and requesting to be discharged with improvement in her symptoms reported after changing Abilify to AM dosing (instead of PM) and this helping her sleep. Patient shares how distressing recent MVP repair was and how she has had difficulty dealing with this despite knowing she is well otherwise at this time and does not require significant medical management for this condition any longer. Suspect characterological components contributory to presentation and possible depressive symptoms (adjustment d/o with depressive and anxiety features versus MDD, r/o BPAD). Patient not acutely suicidal and low imminent risk and had recent medication changes in outpatient setting and has active outpatient care. Reassess tomorrow to see if ongoing mood stability and expand collateral with outpatient providers.     -07/18: patient feels better, but still endorses depressive symptoms. Denies SI/HI and AH/VH. She said she had a good response to ECT in the past and wants do pursue this treatment option. Will start referral process.  -07/19: patient still has depressive symptoms, expected to start ECT. She was medically cleared, but needs additional labs (ordered today). MOCA was performed and patient scored 23. She signed consent today.

## 2022-07-19 NOTE — BH INPATIENT PSYCHIATRY PROGRESS NOTE - NSBHFUPINTERVALHXFT_PSY_A_CORE
Patient was interviewed for follow up today. She still has depressive symptoms, mostly anhedonia, depressive ruminations and poor concentration. She is expected to begin ECT. She was cleared by medicine, pending additional labs and EKG. MOCA was performed and was 23. Patient was aware about the deficits (mostly attention) and was upset about them, anxious if they would improve after treatment and if they are associated with depression.

## 2022-07-20 PROCEDURE — 99232 SBSQ HOSP IP/OBS MODERATE 35: CPT | Mod: GC

## 2022-07-20 RX ADMIN — Medication 200 MILLIGRAM(S): at 22:06

## 2022-07-20 RX ADMIN — Medication 0.5 MILLIGRAM(S): at 16:17

## 2022-07-20 RX ADMIN — ARIPIPRAZOLE 10 MILLIGRAM(S): 15 TABLET ORAL at 09:06

## 2022-07-20 RX ADMIN — Medication 0.5 MILLIGRAM(S): at 07:13

## 2022-07-20 RX ADMIN — Medication 200 MILLIGRAM(S): at 22:45

## 2022-07-20 RX ADMIN — Medication 300 MILLIGRAM(S): at 09:07

## 2022-07-20 RX ADMIN — Medication 50 MILLIGRAM(S): at 22:06

## 2022-07-20 NOTE — BH INPATIENT PSYCHIATRY PROGRESS NOTE - NSBHFUPINTERVALHXFT_PSY_A_CORE
ECT delayed because of need to complete medical clearance; repeat EKG may not be needed; patient worries about weight loss and poor appetite and appears distressed; however Not endorsing  suicidal or homicidal ideation intent or plans; no mention of auditory or visual hallucinations Alert; oriented; cognition intact; speech clear; no tremor or evidence of movement impairment. i&j fair to poor : aware of medications and acknowledges symptoms but not reflective in a meaningful way  on issues that impact on symptoms.  ECT delayed because of need to complete medical clearance. Patient was cleared later during the day. She complains of depressive symptoms. Has been more inattentive (made error on MOCA test and also was noticed to be inattentive in groups by therapists). She is anxious about doing ECT as soon as possible.

## 2022-07-20 NOTE — BH INPATIENT PSYCHIATRY PROGRESS NOTE - NSTXDEPRESGOALOTHER_PSY_ALL_CORE
Patient will stabilize mood & alleviate sx of depression to engage with discharge planning.

## 2022-07-20 NOTE — BH INPATIENT PSYCHIATRY PROGRESS NOTE - NSBHCHARTREVIEWVS_PSY_A_CORE FT
Vital Signs Last 24 Hrs  T(C): 37.3 (07-20-22 @ 09:00), Max: 37.3 (07-20-22 @ 09:00)  T(F): 99.1 (07-20-22 @ 09:00), Max: 99.1 (07-20-22 @ 09:00)  HR: 108 (07-20-22 @ 09:00) (108 - 108)  BP: 132/92 (07-20-22 @ 09:00) (126/84 - 132/92)  BP(mean): --  RR: 17 (07-20-22 @ 09:00) (17 - 17)  SpO2: 99% (07-20-22 @ 09:00) (99% - 99%)     Vital Signs Last 24 Hrs  T(C): 37.2 (07-20-22 @ 17:39), Max: 37.3 (07-20-22 @ 09:00)  T(F): 98.9 (07-20-22 @ 17:39), Max: 99.1 (07-20-22 @ 09:00)  HR: 106 (07-20-22 @ 17:39) (106 - 108)  BP: 125/71 (07-20-22 @ 17:39) (125/71 - 132/92)  BP(mean): --  RR: 18 (07-20-22 @ 17:39) (17 - 18)  SpO2: 98% (07-20-22 @ 17:39) (98% - 99%)

## 2022-07-20 NOTE — PROGRESS NOTE ADULT - ATTENDING COMMENTS
Patient was seen and examined with the resident team today.  I agree with Dr. Abi Pearson's assessment and plan with the following exceptions/additions:     Briefly, this is a 65yo woman with a PMH of MVP s/p repair (03/2022, [facility]), multiple personality disorders and recurrent MDD who p/w relapsed MDD, failing outpatient treatment, currently awaiting ECT.  Medicine consulted for pre-op.  Risk assessment as per above.  Pertinent labs, imaging and EKG from 7/16 reviewed.      Will sign-off at this time.  Please re-consult w/additional questions/concerns.    Denisse Elkins  [phone]

## 2022-07-20 NOTE — BH INPATIENT PSYCHIATRY PROGRESS NOTE - NSBHATTESTCOMMENTATTENDFT_PSY_A_CORE
Recommendations: Prepare patient for ECT; med consult to clear patient;    Brief hx:66-year-old domiciled female with PPHx of depression, self-reported narcissistic and borderline personality disorders, 1PPH in 2012 for depression and received 8 unilateral ECT treatments per patient, no pSA/NSSIB, BIBS for worsening depressive symptoms since a mitral valve relapse surgery in 3/2022 and patient feeling overwhelmed/more anxious having had no prior medical ailments and then requiring a heart surgery this past March. Patient stable and has routine f/u and TTE in the fall of this year, no other PMH. No substance use history.    ---xyx  ;;07/20: ECT delayed because of need to complete medical clearance; repeat EKG may not be needed; patient worries about weight loss and poor appetite and appears distressed; however Not endorsing  suicidal or homicidal ideation intent or plans; no mention of auditory or visual hallucinations Alert; oriented; cognition intact; speech clear; no tremor or evidence of movement impairment. i&j fair to poor : aware of medications and acknowledges symptoms but not reflective in a meaningful way  on issues that impact on symptoms.  Await clearance by medicine.

## 2022-07-20 NOTE — BH INPATIENT PSYCHIATRY PROGRESS NOTE - NSBHASSESSSUMMFT_PSY_ALL_CORE
This is a 66-year-old domiciled female with PPHx of depression, self-reported narcissistic and borderline personality disorders, 1PPH in 2012 for depression and received 8 unilateral ECT treatments per patient, no pSA/NSSIB, BIBS for worsening depressive symptoms since a mitral valve relapse surgery in 3/2022 and patient feeling overwhelmed/more anxious having had no prior medical ailments and then requiring a heart surgery this past March. Patient stable and has routine f/u and TTE in the fall of this year, no other PMH. No substance use history.     On initial assessment, patient is anxious and requesting to be discharged with improvement in her symptoms reported after changing Abilify to AM dosing (instead of PM) and this helping her sleep. Patient shares how distressing recent MVP repair was and how she has had difficulty dealing with this despite knowing she is well otherwise at this time and does not require significant medical management for this condition any longer. Suspect characterological components contributory to presentation and possible depressive symptoms (adjustment d/o with depressive and anxiety features versus MDD, r/o BPAD). Patient not acutely suicidal and low imminent risk and had recent medication changes in outpatient setting and has active outpatient care. Reassess tomorrow to see if ongoing mood stability and expand collateral with outpatient providers.     -07/18: patient feels better, but still endorses depressive symptoms. Denies SI/HI and AH/VH. She said she had a good response to ECT in the past and wants do pursue this treatment option. Will start referral process.  -07/19: patient still has depressive symptoms, expected to start ECT. She was medically cleared, but needs additional labs (ordered today). MOCA was performed and patient scored 23. She signed consent today. This is a 66-year-old domiciled female with PPHx of depression, self-reported narcissistic and borderline personality disorders, 1PPH in 2012 for depression and received 8 unilateral ECT treatments per patient, no pSA/NSSIB, BIBS for worsening depressive symptoms since a mitral valve relapse surgery in 3/2022 and patient feeling overwhelmed/more anxious having had no prior medical ailments and then requiring a heart surgery this past March. Patient stable and has routine f/u and TTE in the fall of this year, no other PMH. No substance use history.     On initial assessment, patient is anxious and requesting to be discharged with improvement in her symptoms reported after changing Abilify to AM dosing (instead of PM) and this helping her sleep. Patient shares how distressing recent MVP repair was and how she has had difficulty dealing with this despite knowing she is well otherwise at this time and does not require significant medical management for this condition any longer. Suspect characterological components contributory to presentation and possible depressive symptoms (adjustment d/o with depressive and anxiety features versus MDD, r/o BPAD). Patient not acutely suicidal and low imminent risk and had recent medication changes in outpatient setting and has active outpatient care. Reassess tomorrow to see if ongoing mood stability and expand collateral with outpatient providers.     -07/18: patient feels better, but still endorses depressive symptoms. Denies SI/HI and AH/VH. She said she had a good response to ECT in the past and wants do pursue this treatment option. Will start referral process.  -07/19: patient still has depressive symptoms, expected to start ECT. She was medically cleared, but needs additional labs (ordered today). MOCA was performed and patient scored 23. She signed consent today.    -07/20: patient medically cleared for ECT today. Plan is to start ECT on Friday. Not endorsing  suicidal or homicidal ideation intent or plans at this moment. Still depressed and unable to properly focus.

## 2022-07-20 NOTE — BH INPATIENT PSYCHIATRY PROGRESS NOTE - NSBHMETABOLIC_PSY_ALL_CORE_FT
BMI: BMI (kg/m2): 22.1 (07-16-22 @ 21:20)  HbA1c: A1C with Estimated Average Glucose Result: 5.5 % (07-19-22 @ 07:40)    Glucose:   BP: 132/92 (07-20-22 @ 09:00) (120/78 - 168/103)  Lipid Panel: Date/Time: 07-19-22 @ 07:40  Cholesterol, Serum: 202  Direct LDL: --  HDL Cholesterol, Serum: 85  Total Cholesterol/HDL Ration Measurement: --  Triglycerides, Serum: 106   BMI: BMI (kg/m2): 22.1 (07-16-22 @ 21:20)  HbA1c: A1C with Estimated Average Glucose Result: 5.5 % (07-19-22 @ 07:40)    Glucose:   BP: 125/71 (07-20-22 @ 17:39) (120/78 - 168/103)  Lipid Panel: Date/Time: 07-19-22 @ 07:40  Cholesterol, Serum: 202  Direct LDL: --  HDL Cholesterol, Serum: 85  Total Cholesterol/HDL Ration Measurement: --  Triglycerides, Serum: 106

## 2022-07-20 NOTE — PROGRESS NOTE ADULT - ASSESSMENT
66F, domiciled, [facility] MVP (s/p surgical repair in March 2022), past psychiatric history of narcissistic & borderline personality disorders, and MDD  presenting for worsening depressive symptoms since March 2022 which have not responded to outpatient treatment and medication management. Medicine consulted for medical clearance for ECT in am    66F, domiciled, PMH MVP (s/p surgical repair in March 2022 at [facility]), past psychiatric history of narcissistic & borderline personality disorders, and MDD  presenting for worsening depressive symptoms since March 2022 which have not responded to outpatient treatment and medication management. Medicine consulted for medical clearance for ECT in am     #Medical clearance   METs>4. RCR class I. Poon score 0. Pt is medically optimized for low risk procedure and no additional cardiac w/up indicated    -labs and EKG reviewed     See attending attestation for final recs      66F, domiciled, PMH MVP (s/p surgical repair in March 2022 at [facility]), past psychiatric history of narcissistic & borderline personality disorders, and MDD  presenting for worsening depressive symptoms since March 2022 which have not responded to outpatient treatment and medication management. Medicine consulted for medical clearance for ECT on 7/19.     #Medical clearance   METs>4. RCR class I. Poon score 0. Pt is medically optimized for low risk procedure and no additional cardiac w/up indicated    -labs and EKG reviewed     See attending attestation for final recs

## 2022-07-20 NOTE — PROGRESS NOTE ADULT - SUBJECTIVE AND OBJECTIVE BOX
SUBJECTIVE / INTERVAL HPI: Pt seen and examined at bedside. Denies f/c, n/v, HA, chest pain, SOB, abdominal pain, diarrhea, constipation, melena, hematochezia, hematuria, dysuria,    VITAL SIGNS:  Vital Signs Last 24 Hrs  T(C): 37.2 (19 Jul 2022 17:22), Max: 37.2 (19 Jul 2022 17:22)  T(F): 99 (19 Jul 2022 17:22), Max: 99 (19 Jul 2022 17:22)  HR: --  BP: 126/84 (19 Jul 2022 17:22) (126/84 - 126/84)  BP(mean): --  RR: --  SpO2: 99% (19 Jul 2022 17:22) (99% - 99%)            PHYSICAL EXAM:  General: NAD   HEENT: MMM  Cardiovascular: +S1/S2, RRR, No murmurs, rubs, gallops  Respiratory: CTA B/L, no W/R/R  Gastrointestinal: soft, NT/ND, +BSx4  Extremities: WWP, no edema, clubbing or cyanosis  Vascular: 2+ radial, DP/PT pulses B/L  Neurological: AAOx3     MEDICATIONS  (STANDING):  ARIPiprazole 10 milliGRAM(s) Oral daily  traZODone 50 milliGRAM(s) Oral at bedtime  venlafaxine  milliGRAM(s) Oral daily    MEDICATIONS  (PRN):  acetaminophen     Tablet .. 650 milliGRAM(s) Oral every 6 hours PRN Moderate Pain (4 - 6)  ibuprofen  Tablet. 200 milliGRAM(s) Oral every 6 hours PRN back pain  LORazepam     Tablet 0.5 milliGRAM(s) Oral three times a day PRN Anxiety  polyethylene glycol 3350 17 Gram(s) Oral at bedtime PRN Constipation    Allergies    No Known Allergies    Intolerances        LABS:                        14.1   12.44 )-----------( 319      ( 19 Jul 2022 20:35 )             41.6     07-19    132<L>  |  94<L>  |  11  ----------------------------<  94  4.3   |  29  |  0.75    Ca    9.6      19 Jul 2022 20:35    TPro  6.9  /  Alb  4.5  /  TBili  0.2  /  DBili  x   /  AST  18  /  ALT  19  /  AlkPhos  77  07-19    PT/INR - ( 19 Jul 2022 20:35 )   PT: 10.7 sec;   INR: 0.90          PTT - ( 19 Jul 2022 20:35 )  PTT:28.8 sec    CAPILLARY BLOOD GLUCOSE              RADIOLOGY & ADDITIONAL TESTS: Reviewed. SUBJECTIVE / INTERVAL HPI: Pt seen and examined at bedside. Denies f/c, n/v, HA, chest pain, SOB, abdominal pain, diarrhea, constipation, melena, hematochezia, hematuria, dysuria.     VITAL SIGNS:  Vital Signs Last 24 Hrs  T(C): 37.2 (19 Jul 2022 17:22), Max: 37.2 (19 Jul 2022 17:22)  T(F): 99 (19 Jul 2022 17:22), Max: 99 (19 Jul 2022 17:22)  HR: --  BP: 126/84 (19 Jul 2022 17:22) (126/84 - 126/84)  BP(mean): --  RR: --  SpO2: 99% (19 Jul 2022 17:22) (99% - 99%)    PHYSICAL EXAM:  General: NAD   HEENT: MMM  Cardiovascular: +S1/S2, RRR, No murmurs, rubs, gallops  Respiratory: CTA B/L, no W/R/R  Gastrointestinal: soft, NT/ND, +BSx4  Extremities: WWP, no edema, clubbing or cyanosis  Vascular: 2+ radial, DP/PT pulses B/L  Neurological: AAOx3     MEDICATIONS  (STANDING):  ARIPiprazole 10 milliGRAM(s) Oral daily  traZODone 50 milliGRAM(s) Oral at bedtime  venlafaxine  milliGRAM(s) Oral daily    MEDICATIONS  (PRN):  acetaminophen     Tablet .. 650 milliGRAM(s) Oral every 6 hours PRN Moderate Pain (4 - 6)  ibuprofen  Tablet. 200 milliGRAM(s) Oral every 6 hours PRN back pain  LORazepam     Tablet 0.5 milliGRAM(s) Oral three times a day PRN Anxiety  polyethylene glycol 3350 17 Gram(s) Oral at bedtime PRN Constipation    Allergies    No Known Allergies    Intolerances        LABS:                        14.1   12.44 )-----------( 319      ( 19 Jul 2022 20:35 )             41.6     07-19    132<L>  |  94<L>  |  11  ----------------------------<  94  4.3   |  29  |  0.75    Ca    9.6      19 Jul 2022 20:35    TPro  6.9  /  Alb  4.5  /  TBili  0.2  /  DBili  x   /  AST  18  /  ALT  19  /  AlkPhos  77  07-19    PT/INR - ( 19 Jul 2022 20:35 )   PT: 10.7 sec;   INR: 0.90          PTT - ( 19 Jul 2022 20:35 )  PTT:28.8 sec    CAPILLARY BLOOD GLUCOSE              RADIOLOGY & ADDITIONAL TESTS: Reviewed. SUBJECTIVE / INTERVAL HPI: Pt seen and examined at bedside. Denies f/c, n/v, HA, chest pain, SOB, abdominal pain, diarrhea, constipation, melena, hematochezia, hematuria, dysuria. Upset that ECT got canceled.     VITAL SIGNS:  Vital Signs Last 24 Hrs  T(C): 37.2 (19 Jul 2022 17:22), Max: 37.2 (19 Jul 2022 17:22)  T(F): 99 (19 Jul 2022 17:22), Max: 99 (19 Jul 2022 17:22)  HR: --  BP: 126/84 (19 Jul 2022 17:22) (126/84 - 126/84)  BP(mean): --  RR: --  SpO2: 99% (19 Jul 2022 17:22) (99% - 99%)    PHYSICAL EXAM:  General: NAD   HEENT: MMM  Cardiovascular: +S1/S2, RRR, No murmurs, rubs, gallops  Respiratory: CTA B/L, no W/R/R  Gastrointestinal: soft, NT/ND, +BSx4  Extremities: WWP, no edema, clubbing or cyanosis  Vascular: 2+ radial, DP/PT pulses B/L  Neurological: AAOx3     MEDICATIONS  (STANDING):  ARIPiprazole 10 milliGRAM(s) Oral daily  traZODone 50 milliGRAM(s) Oral at bedtime  venlafaxine  milliGRAM(s) Oral daily    MEDICATIONS  (PRN):  acetaminophen     Tablet .. 650 milliGRAM(s) Oral every 6 hours PRN Moderate Pain (4 - 6)  ibuprofen  Tablet. 200 milliGRAM(s) Oral every 6 hours PRN back pain  LORazepam     Tablet 0.5 milliGRAM(s) Oral three times a day PRN Anxiety  polyethylene glycol 3350 17 Gram(s) Oral at bedtime PRN Constipation    Allergies    No Known Allergies    Intolerances        LABS:                        14.1   12.44 )-----------( 319      ( 19 Jul 2022 20:35 )             41.6     07-19    132<L>  |  94<L>  |  11  ----------------------------<  94  4.3   |  29  |  0.75    Ca    9.6      19 Jul 2022 20:35    TPro  6.9  /  Alb  4.5  /  TBili  0.2  /  DBili  x   /  AST  18  /  ALT  19  /  AlkPhos  77  07-19    PT/INR - ( 19 Jul 2022 20:35 )   PT: 10.7 sec;   INR: 0.90          PTT - ( 19 Jul 2022 20:35 )  PTT:28.8 sec    CAPILLARY BLOOD GLUCOSE              RADIOLOGY & ADDITIONAL TESTS: Reviewed.

## 2022-07-21 PROCEDURE — 99232 SBSQ HOSP IP/OBS MODERATE 35: CPT

## 2022-07-21 RX ADMIN — Medication 0.5 MILLIGRAM(S): at 21:07

## 2022-07-21 RX ADMIN — Medication 200 MILLIGRAM(S): at 10:07

## 2022-07-21 RX ADMIN — Medication 200 MILLIGRAM(S): at 21:07

## 2022-07-21 RX ADMIN — Medication 300 MILLIGRAM(S): at 10:07

## 2022-07-21 RX ADMIN — Medication 200 MILLIGRAM(S): at 21:30

## 2022-07-21 RX ADMIN — Medication 200 MILLIGRAM(S): at 14:40

## 2022-07-21 RX ADMIN — Medication 0.5 MILLIGRAM(S): at 12:53

## 2022-07-21 RX ADMIN — Medication 50 MILLIGRAM(S): at 21:07

## 2022-07-21 RX ADMIN — ARIPIPRAZOLE 10 MILLIGRAM(S): 15 TABLET ORAL at 10:08

## 2022-07-21 RX ADMIN — Medication 0.5 MILLIGRAM(S): at 06:53

## 2022-07-21 RX ADMIN — Medication 0.5 MILLIGRAM(S): at 14:49

## 2022-07-21 NOTE — BH INPATIENT PSYCHIATRY PROGRESS NOTE - NSBHFUPINTERVALHXFT_PSY_A_CORE
Patient was interviewed in person in her room. She reported not feeling well today and didn't know if she could stand to wait anymore for her ECT treatment. Moreover, she stated "If I could end it if I would" but went on to say that she would not do it. When asked about suicidality, the patient denied.  Furthermore, patient reported anhedonia, lack of interest in usual activities, and non-existent concentration. She also reported guilt and regrets about just life in general but did not elaborate about it when prompted. She later mentioned feeling guilty of not being sure if she wants her  to visit her today because she "feels crappy" and does not want him to see her like this. Patient also reported feeling of "getting worse on the unit" and voiced regret of coming to the inpatient unit. Later elaborated that she probably would have gotten worse even if she didn't come to the hospital. She reported not sleeping well due to anxiety and asked for an additional dose of Ativan. Patient stated that it "takes the edge off" and she usually takes it 3 times daily. The patient reported back pain and stated that it is anxiety provoking. Patient stated that she feels safe on the unit.

## 2022-07-21 NOTE — BH INPATIENT PSYCHIATRY PROGRESS NOTE - NSBHMETABOLIC_PSY_ALL_CORE_FT
BMI: BMI (kg/m2): 22.1 (07-16-22 @ 21:20)  HbA1c: A1C with Estimated Average Glucose Result: 5.5 % (07-19-22 @ 07:40)    Glucose:   BP: 146/91 (07-21-22 @ 08:37) (120/78 - 168/103)  Lipid Panel: Date/Time: 07-19-22 @ 07:40  Cholesterol, Serum: 202  Direct LDL: --  HDL Cholesterol, Serum: 85  Total Cholesterol/HDL Ration Measurement: --  Triglycerides, Serum: 106   BMI: BMI (kg/m2): 22.1 (07-16-22 @ 21:20)  HbA1c: A1C with Estimated Average Glucose Result: 5.5 % (07-19-22 @ 07:40)    Glucose:   BP: 146/96 (07-21-22 @ 17:05) (120/78 - 146/96)  Lipid Panel: Date/Time: 07-19-22 @ 07:40  Cholesterol, Serum: 202  Direct LDL: --  HDL Cholesterol, Serum: 85  Total Cholesterol/HDL Ration Measurement: --  Triglycerides, Serum: 106

## 2022-07-21 NOTE — BH INPATIENT PSYCHIATRY PROGRESS NOTE - NSBHATTESTCOMMENTATTENDFT_PSY_A_CORE
Recommendations: Prepare patient for ECT; med consult to clear patient;    Brief hx:66-year-old domiciled female with PPHx of depression, self-reported narcissistic and borderline personality disorders, 1PPH in 2012 for depression and received 8 unilateral ECT treatments per patient, no pSA/NSSIB, BIBS for worsening depressive symptoms since a mitral valve relapse surgery in 3/2022 and patient feeling overwhelmed/more anxious having had no prior medical ailments and then requiring a heart surgery this past March. Patient stable and has routine f/u and TTE in the fall of this year, no other PMH. No substance use history.    ---xyx  ;;07/20: ECT delayed because of need to complete medical clearance; repeat EKG may not be needed; patient worries about weight loss and poor appetite and appears distressed; however Not endorsing  suicidal or homicidal ideation intent or plans; no mention of auditory or visual hallucinations Alert; oriented; cognition intact; speech clear; no tremor or evidence of movement impairment. i&j fair to poor : aware of medications and acknowledges symptoms but not reflective in a meaningful way  on issues that impact on symptoms.  Await clearance by medicine.  Recommendations: Prepare patient for ECT in am; monitor sxs    Brief hx:66-year-old domiciled female with PPHx of depression, self-reported narcissistic and borderline personality disorders, 1PPH in 2012 for depression and received 8 unilateral ECT treatments per patient, no pSA/NSSIB, BIBS for worsening depressive symptoms since a mitral valve relapse surgery in 3/2022 and patient feeling overwhelmed/more anxious having had no prior medical ailments and then requiring a heart surgery this past March. Patient stable and has routine f/u and TTE in the fall of this year, no other PMH. No substance use history.    ---xyx  ;;07/21: more anxous and more intense stating she cannot "do anything" required jprn Ativan; anticipating ETC RUL #1 in am. Not endorsing  suicidal or homicidal ideation intent or plans; no mention of auditory or visual hallucinations Alert; oriented; cognition intact; speech clear; no tremor or evidence of movement impairment.

## 2022-07-21 NOTE — BH INPATIENT PSYCHIATRY PROGRESS NOTE - NSBHASSESSSUMMFT_PSY_ALL_CORE
This is a 66-year-old domiciled female with PPHx of depression, self-reported narcissistic and borderline personality disorders, 1PPH in 2012 for depression and received 8 unilateral ECT treatments per patient, no pSA/NSSIB, BIBS for worsening depressive symptoms since a mitral valve relapse surgery in 3/2022 and patient feeling overwhelmed/more anxious having had no prior medical ailments and then requiring a heart surgery this past March. Patient stable and has routine f/u and TTE in the fall of this year, no other PMH. No substance use history.     On initial assessment, patient is anxious and requesting to be discharged with improvement in her symptoms reported after changing Abilify to AM dosing (instead of PM) and this helping her sleep. Patient shares how distressing recent MVP repair was and how she has had difficulty dealing with this despite knowing she is well otherwise at this time and does not require significant medical management for this condition any longer. Suspect characterological components contributory to presentation and possible depressive symptoms (adjustment d/o with depressive and anxiety features versus MDD, r/o BPAD). Patient not acutely suicidal and low imminent risk and had recent medication changes in outpatient setting and has active outpatient care. Reassess tomorrow to see if ongoing mood stability and expand collateral with outpatient providers.     -07/18: patient feels better, but still endorses depressive symptoms. Denies SI/HI and AH/VH. She said she had a good response to ECT in the past and wants do pursue this treatment option. Will start referral process.  -07/19: patient still has depressive symptoms, expected to start ECT. She was medically cleared, but needs additional labs (ordered today). MOCA was performed and patient scored 23. She signed consent today.    -07/20: patient medically cleared for ECT today. Plan is to start ECT on Friday. Not endorsing  suicidal or homicidal ideation intent or plans at this moment. Still depressed and unable to properly focus.     -07/21: patient still endorses depressive symptoms, and reported back pain. patient has been medically cleared for ECT starting tomorrow. Denied suicidal or homicidal ideation intent or plans at this moment. Still depressed and unable to properly focus/concentrate.

## 2022-07-21 NOTE — CHART NOTE - NSCHARTNOTEFT_GEN_A_CORE
Pt referred for ECT.  Chart reviewed, case discussed with staff.  67 yo female with h/o MDE, narcissistic and borderline personality disorder/traits with h/o 1 admit with ECT 10 yrs ago (reporting ECT was helpful for depression symptoms) with inc depressive symptoms since 3/22 despite medication trials/compliance.  Has PMH of MVP in 3/22.  Continues to have symptoms of depression and requesting ECT.  Medications reviewed, being medically optimized by med consult.   IMP: MDE, Narcissistic and Borderline PD/traits.   REC: Pt will be seen by ECT MD prior to 1st treatment, likely with start acute course of RUL ECT.  Pt aware ECT may help alleviate but may not lead to total symptom reducation given comorbid Axis II history.

## 2022-07-21 NOTE — BH INPATIENT PSYCHIATRY PROGRESS NOTE - NSBHCHARTREVIEWVS_PSY_A_CORE FT
Vital Signs Last 24 Hrs  T(C): 36.8 (07-21-22 @ 08:37), Max: 37.2 (07-20-22 @ 17:39)  T(F): 98.3 (07-21-22 @ 08:37), Max: 98.9 (07-20-22 @ 17:39)  HR: 109 (07-21-22 @ 08:37) (106 - 109)  BP: 146/91 (07-21-22 @ 08:37) (125/71 - 146/91)  BP(mean): --  RR: 18 (07-21-22 @ 08:37) (18 - 18)  SpO2: 98% (07-21-22 @ 08:37) (98% - 98%)     Vital Signs Last 24 Hrs  T(C): 37.1 (07-21-22 @ 17:05), Max: 37.1 (07-21-22 @ 17:05)  T(F): 98.8 (07-21-22 @ 17:05), Max: 98.8 (07-21-22 @ 17:05)  HR: 115 (07-21-22 @ 17:05) (109 - 115)  BP: 146/96 (07-21-22 @ 17:05) (146/91 - 146/96)  BP(mean): --  RR: 18 (07-21-22 @ 17:05) (18 - 18)  SpO2: 96% (07-21-22 @ 17:05) (96% - 98%)

## 2022-07-21 NOTE — BH INPATIENT PSYCHIATRY PROGRESS NOTE - NSBHMETABOLIC_PSY_ALL_CORE_FT
BMI: BMI (kg/m2): 22.1 (07-16-22 @ 21:20)  HbA1c: A1C with Estimated Average Glucose Result: 5.5 % (07-19-22 @ 07:40)    Glucose:   BP: 146/91 (07-21-22 @ 08:37) (120/78 - 168/103)  Lipid Panel: Date/Time: 07-19-22 @ 07:40  Cholesterol, Serum: 202  Direct LDL: --  HDL Cholesterol, Serum: 85  Total Cholesterol/HDL Ration Measurement: --  Triglycerides, Serum: 106

## 2022-07-21 NOTE — BH INPATIENT PSYCHIATRY PROGRESS NOTE - NSBHASSESSSUMMFT_PSY_ALL_CORE
This is a 66-year-old domiciled female with PPHx of depression, self-reported narcissistic and borderline personality disorders, 1PPH in 2012 for depression and received 8 unilateral ECT treatments per patient, no pSA/NSSIB, BIBS for worsening depressive symptoms since a mitral valve relapse surgery in 3/2022 and patient feeling overwhelmed/more anxious having had no prior medical ailments and then requiring a heart surgery this past March. Patient stable and has routine f/u and TTE in the fall of this year, no other PMH. No substance use history.     On initial assessment, patient is anxious and requesting to be discharged with improvement in her symptoms reported after changing Abilify to AM dosing (instead of PM) and this helping her sleep. Patient shares how distressing recent MVP repair was and how she has had difficulty dealing with this despite knowing she is well otherwise at this time and does not require significant medical management for this condition any longer. Suspect characterological components contributory to presentation and possible depressive symptoms (adjustment d/o with depressive and anxiety features versus MDD, r/o BPAD). Patient not acutely suicidal and low imminent risk and had recent medication changes in outpatient setting and has active outpatient care. Reassess tomorrow to see if ongoing mood stability and expand collateral with outpatient providers.     -07/18: patient feels better, but still endorses depressive symptoms. Denies SI/HI and AH/VH. She said she had a good response to ECT in the past and wants do pursue this treatment option. Will start referral process.  -07/19: patient still has depressive symptoms, expected to start ECT. She was medically cleared, but needs additional labs (ordered today). MOCA was performed and patient scored 23. She signed consent today.    -07/20: patient medically cleared for ECT today. Plan is to start ECT on Friday. Not endorsing  suicidal or homicidal ideation intent or plans at this moment. Still depressed and unable to properly focus.   ;;07/21: complains of anxiety and not "being able to do anything" ; sleep fair yet Not endorsing  suicidal or homicidal ideation intent or plans; no mention of auditory or visual hallucinations Alert; oriented; cognition intact; speech clear; no tremor or evidence of movement impairment.  ECT # RUL in am.

## 2022-07-21 NOTE — BH INPATIENT PSYCHIATRY PROGRESS NOTE - NSBHCHARTREVIEWVS_PSY_A_CORE FT
Vital Signs Last 24 Hrs  T(C): 36.8 (07-21-22 @ 08:37), Max: 37.2 (07-20-22 @ 17:39)  T(F): 98.3 (07-21-22 @ 08:37), Max: 98.9 (07-20-22 @ 17:39)  HR: 109 (07-21-22 @ 08:37) (106 - 109)  BP: 146/91 (07-21-22 @ 08:37) (125/71 - 146/91)  BP(mean): --  RR: 18 (07-21-22 @ 08:37) (18 - 18)  SpO2: 98% (07-21-22 @ 08:37) (98% - 98%)

## 2022-07-21 NOTE — BH INPATIENT PSYCHIATRY PROGRESS NOTE - NSBHFUPINTERVALHXFT_PSY_A_CORE
ECT delayed because of need to complete medical clearance. Patient was cleared later during the day. She complains of depressive symptoms. Has been more inattentive (made error on MOCA test and also was noticed to be inattentive in groups by therapists). She is anxious about doing ECT as soon as possible.

## 2022-07-21 NOTE — BH INPATIENT PSYCHIATRY PROGRESS NOTE - CURRENT MEDICATION
MEDICATIONS  (STANDING):  ARIPiprazole 10 milliGRAM(s) Oral daily  LORazepam     Tablet 0.5 milliGRAM(s) Oral once  traZODone 50 milliGRAM(s) Oral at bedtime  venlafaxine  milliGRAM(s) Oral daily    MEDICATIONS  (PRN):  acetaminophen     Tablet .. 650 milliGRAM(s) Oral every 6 hours PRN Moderate Pain (4 - 6)  ibuprofen  Tablet. 200 milliGRAM(s) Oral every 6 hours PRN back pain  LORazepam     Tablet 0.5 milliGRAM(s) Oral three times a day PRN Anxiety  polyethylene glycol 3350 17 Gram(s) Oral at bedtime PRN Constipation

## 2022-07-22 LAB — SARS-COV-2 RNA SPEC QL NAA+PROBE: SIGNIFICANT CHANGE UP

## 2022-07-22 PROCEDURE — 90870 ELECTROCONVULSIVE THERAPY: CPT

## 2022-07-22 RX ADMIN — ARIPIPRAZOLE 10 MILLIGRAM(S): 15 TABLET ORAL at 10:43

## 2022-07-22 RX ADMIN — Medication 0.5 MILLIGRAM(S): at 21:15

## 2022-07-22 RX ADMIN — Medication 0.5 MILLIGRAM(S): at 16:57

## 2022-07-22 RX ADMIN — Medication 200 MILLIGRAM(S): at 14:49

## 2022-07-22 RX ADMIN — Medication 200 MILLIGRAM(S): at 15:42

## 2022-07-22 RX ADMIN — Medication 50 MILLIGRAM(S): at 21:15

## 2022-07-22 RX ADMIN — Medication 300 MILLIGRAM(S): at 10:42

## 2022-07-22 NOTE — BH INPATIENT PSYCHIATRY PROGRESS NOTE - NSBHMETABOLIC_PSY_ALL_CORE_FT
BMI: BMI (kg/m2): 22.1 (07-22-22 @ 06:36)  HbA1c: A1C with Estimated Average Glucose Result: 5.5 % (07-19-22 @ 07:40)    Glucose:   BP: 131/96 (07-22-22 @ 09:40) (113/86 - 177/110)  Lipid Panel: Date/Time: 07-19-22 @ 07:40  Cholesterol, Serum: 202  Direct LDL: --  HDL Cholesterol, Serum: 85  Total Cholesterol/HDL Ration Measurement: --  Triglycerides, Serum: 106

## 2022-07-22 NOTE — BH INPATIENT PSYCHIATRY PROGRESS NOTE - NSBHASSESSSUMMFT_PSY_ALL_CORE
This is a 66-year-old domiciled female with PPHx of depression, self-reported narcissistic and borderline personality disorders, 1PPH in 2012 for depression and received 8 unilateral ECT treatments per patient, no pSA/NSSIB, BIBS for worsening depressive symptoms since a mitral valve relapse surgery in 3/2022 and patient feeling overwhelmed/more anxious having had no prior medical ailments and then requiring a heart surgery this past March. Patient stable and has routine f/u and TTE in the fall of this year, no other PMH. No substance use history.     On initial assessment, patient is anxious and requesting to be discharged with improvement in her symptoms reported after changing Abilify to AM dosing (instead of PM) and this helping her sleep. Patient shares how distressing recent MVP repair was and how she has had difficulty dealing with this despite knowing she is well otherwise at this time and does not require significant medical management for this condition any longer. Suspect characterological components contributory to presentation and possible depressive symptoms (adjustment d/o with depressive and anxiety features versus MDD, r/o BPAD). Patient not acutely suicidal and low imminent risk and had recent medication changes in outpatient setting and has active outpatient care. Reassess tomorrow to see if ongoing mood stability and expand collateral with outpatient providers.     -07/18: patient feels better, but still endorses depressive symptoms. Denies SI/HI and AH/VH. She said she had a good response to ECT in the past and wants do pursue this treatment option. Will start referral process.  -07/19: patient still has depressive symptoms, expected to start ECT. She was medically cleared, but needs additional labs (ordered today). MOCA was performed and patient scored 23. She signed consent today.    -07/20: patient medically cleared for ECT today. Plan is to start ECT on Friday. Not endorsing  suicidal or homicidal ideation intent or plans at this moment. Still depressed and unable to properly focus.   ;;07/21: complains of anxiety and not "being able to do anything" ; sleep fair yet Not endorsing  suicidal or homicidal ideation intent or plans; no mention of auditory or visual hallucinations Alert; oriented; cognition intact; speech clear; no tremor or evidence of movement impairment.  ECT # RUL in am.  -07/22: first session of ECT performed today without complications or side effects (other than mild dizziness after the session). Patient was stable during the afternoon. Will monitor as treatment progresses.

## 2022-07-22 NOTE — BH INPATIENT PSYCHIATRY PROGRESS NOTE - NSBHCHARTREVIEWVS_PSY_A_CORE FT
Vital Signs Last 24 Hrs  T(C): 37.1 (07-22-22 @ 09:40), Max: 37.1 (07-21-22 @ 17:05)  T(F): 98.7 (07-22-22 @ 09:40), Max: 98.8 (07-21-22 @ 17:05)  HR: 127 (07-22-22 @ 09:40) (103 - 135)  BP: 131/96 (07-22-22 @ 09:40) (113/86 - 177/110)  BP(mean): 108 (07-22-22 @ 09:30) (105 - 116)  RR: 18 (07-22-22 @ 09:40) (17 - 31)  SpO2: 96% (07-22-22 @ 09:40) (92% - 97%)

## 2022-07-22 NOTE — BH INPATIENT PSYCHIATRY PROGRESS NOTE - NSBHFUPINTERVALHXFT_PSY_A_CORE
Patient underwent ECT today. Complained of dizziness afterwards and poor appetite. Later on, said she felt better but wanted to rest.

## 2022-07-23 RX ADMIN — ARIPIPRAZOLE 10 MILLIGRAM(S): 15 TABLET ORAL at 09:32

## 2022-07-23 RX ADMIN — Medication 0.5 MILLIGRAM(S): at 09:32

## 2022-07-23 RX ADMIN — Medication 0.5 MILLIGRAM(S): at 21:11

## 2022-07-23 RX ADMIN — Medication 50 MILLIGRAM(S): at 21:10

## 2022-07-23 RX ADMIN — Medication 300 MILLIGRAM(S): at 09:32

## 2022-07-24 PROCEDURE — 99232 SBSQ HOSP IP/OBS MODERATE 35: CPT

## 2022-07-24 RX ADMIN — Medication 300 MILLIGRAM(S): at 10:13

## 2022-07-24 RX ADMIN — Medication 0.5 MILLIGRAM(S): at 13:39

## 2022-07-24 RX ADMIN — ARIPIPRAZOLE 10 MILLIGRAM(S): 15 TABLET ORAL at 10:14

## 2022-07-24 RX ADMIN — Medication 0.5 MILLIGRAM(S): at 21:08

## 2022-07-24 RX ADMIN — Medication 50 MILLIGRAM(S): at 21:08

## 2022-07-24 NOTE — BH INPATIENT PSYCHIATRY PROGRESS NOTE - NSBHCHARTREVIEWVS_PSY_A_CORE FT
Vital Signs Last 24 Hrs  T(C): 37 (07-23-22 @ 17:00), Max: 37.4 (07-23-22 @ 09:00)  T(F): 98.6 (07-23-22 @ 17:00), Max: 99.3 (07-23-22 @ 09:00)  HR: 99 (07-23-22 @ 17:00) (99 - 117)  BP: 118/84 (07-23-22 @ 17:00) (104/74 - 118/84)  BP(mean): --  RR: 17 (07-23-22 @ 17:00) (17 - 18)  SpO2: 98% (07-23-22 @ 17:00) (98% - 99%)     Vital Signs Last 24 Hrs  T(C): 37 (07-23-22 @ 17:00), Max: 37 (07-23-22 @ 17:00)  T(F): 98.6 (07-23-22 @ 17:00), Max: 98.6 (07-23-22 @ 17:00)  HR: 99 (07-23-22 @ 17:00) (99 - 99)  BP: 118/84 (07-23-22 @ 17:00) (118/84 - 118/84)  BP(mean): --  RR: 17 (07-23-22 @ 17:00) (17 - 17)  SpO2: 98% (07-23-22 @ 17:00) (98% - 98%)

## 2022-07-24 NOTE — BH INPATIENT PSYCHIATRY PROGRESS NOTE - NSBHMETABOLIC_PSY_ALL_CORE_FT
BMI: BMI (kg/m2): 22.1 (07-22-22 @ 06:36)  HbA1c: A1C with Estimated Average Glucose Result: 5.5 % (07-19-22 @ 07:40)    Glucose:   BP: 118/84 (07-23-22 @ 17:00) (104/74 - 177/110)  Lipid Panel: Date/Time: 07-19-22 @ 07:40  Cholesterol, Serum: 202  Direct LDL: --  HDL Cholesterol, Serum: 85  Total Cholesterol/HDL Ration Measurement: --  Triglycerides, Serum: 106

## 2022-07-24 NOTE — BH INPATIENT PSYCHIATRY PROGRESS NOTE - NSBHFUPINTERVALHXFT_PSY_A_CORE
Sleep  appetite ok; no pain issues.  stated that felt odd sxs after first ECT but notes improved appetite;  Not endorsing  suicidal or homicidal ideation intent or plans; no mention of auditory or visual hallucinations  Alert; oriented; cognition intact; speech clear; no tremor or evidence of movement impairment.  good eye contact; clearly brightening of mood; now discharged focused wanting outpatient ECT.  ECT #2 RUL for am.

## 2022-07-24 NOTE — BH INPATIENT PSYCHIATRY PROGRESS NOTE - NSBHASSESSSUMMFT_PSY_ALL_CORE
This is a 66-year-old domiciled female with PPHx of depression, self-reported narcissistic and borderline personality disorders, 1PPH in 2012 for depression and received 8 unilateral ECT treatments per patient, no pSA/NSSIB, BIBS for worsening depressive symptoms since a mitral valve relapse surgery in 3/2022 and patient feeling overwhelmed/more anxious having had no prior medical ailments and then requiring a heart surgery this past March. Patient stable and has routine f/u and TTE in the fall of this year, no other PMH. No substance use history.     On initial assessment, patient is anxious and requesting to be discharged with improvement in her symptoms reported after changing Abilify to AM dosing (instead of PM) and this helping her sleep. Patient shares how distressing recent MVP repair was and how she has had difficulty dealing with this despite knowing she is well otherwise at this time and does not require significant medical management for this condition any longer. Suspect characterological components contributory to presentation and possible depressive symptoms (adjustment d/o with depressive and anxiety features versus MDD, r/o BPAD). Patient not acutely suicidal and low imminent risk and had recent medication changes in outpatient setting and has active outpatient care. Reassess tomorrow to see if ongoing mood stability and expand collateral with outpatient providers.     -07/18: patient feels better, but still endorses depressive symptoms. Denies SI/HI and AH/VH. She said she had a good response to ECT in the past and wants do pursue this treatment option. Will start referral process.  -07/19: patient still has depressive symptoms, expected to start ECT. She was medically cleared, but needs additional labs (ordered today). MOCA was performed and patient scored 23. She signed consent today.    -07/20: patient medically cleared for ECT today. Plan is to start ECT on Friday. Not endorsing  suicidal or homicidal ideation intent or plans at this moment. Still depressed and unable to properly focus.   ;;07/21: complains of anxiety and not "being able to do anything" ; sleep fair yet Not endorsing  suicidal or homicidal ideation intent or plans; no mention of auditory or visual hallucinations Alert; oriented; cognition intact; speech clear; no tremor or evidence of movement impairment.  ECT # RUL in am.  -07/22: first session of ECT performed today without complications or side effects (other than mild dizziness after the session). Patient was stable during the afternoon. Will monitor as treatment progresses.  ;;07/24: appetite and mood improved; transient sxs after ECT; discharged focused; ECT #2 in am RUL.

## 2022-07-25 PROCEDURE — 90870 ELECTROCONVULSIVE THERAPY: CPT

## 2022-07-25 RX ADMIN — Medication 50 MILLIGRAM(S): at 20:52

## 2022-07-25 RX ADMIN — Medication 200 MILLIGRAM(S): at 13:53

## 2022-07-25 RX ADMIN — Medication 0.5 MILLIGRAM(S): at 16:14

## 2022-07-25 RX ADMIN — Medication 300 MILLIGRAM(S): at 11:22

## 2022-07-25 RX ADMIN — ARIPIPRAZOLE 10 MILLIGRAM(S): 15 TABLET ORAL at 11:22

## 2022-07-25 RX ADMIN — Medication 0.5 MILLIGRAM(S): at 20:52

## 2022-07-25 RX ADMIN — Medication 200 MILLIGRAM(S): at 14:20

## 2022-07-25 NOTE — BH INPATIENT PSYCHIATRY PROGRESS NOTE - NSBHCHARTREVIEWVS_PSY_A_CORE FT
Vital Signs Last 24 Hrs  T(C): 36.4 (07-25-22 @ 08:20), Max: 37.7 (07-25-22 @ 06:41)  T(F): 97.6 (07-25-22 @ 08:20), Max: 99.8 (07-25-22 @ 06:41)  HR: 102 (07-25-22 @ 09:00) (96 - 115)  BP: 136/89 (07-25-22 @ 09:00) (111/79 - 153/91)  BP(mean): 105 (07-25-22 @ 09:00) (104 - 116)  RR: 20 (07-25-22 @ 09:00) (10 - 22)  SpO2: 96% (07-25-22 @ 09:00) (96% - 99%)

## 2022-07-25 NOTE — BH TREATMENT PLAN - NSPTSTATEDGOAL_PSY_ALL_CORE
"To get good care from Dr. Black, and to explore outpatient ECT."
"To be able to enjoy life again."
"To get good care from Dr. Black, and to explore outpatient ECT."

## 2022-07-25 NOTE — BH TREATMENT PLAN - NSTXDEPRESINTERPR_PSY_ALL_CORE
Pt. will continue to be invited and encouraged to join all offered groups
Pt. will be invited and encouraged to join all offered groups
Pt. will be invited and encouraged to join all offered groups

## 2022-07-25 NOTE — BH TREATMENT PLAN - NSCMSPTSTRENGTHS_PSY_ALL_CORE
Expressive of emotions/Lauren/spirituality/Financial stability/Future/goal oriented/Leisure interest
Expressive of emotions/Lauren/spirituality/Financial stability/Future/goal oriented/Leisure interest
Expressive of emotions/Lauren/spirituality/Financial stability/Future/goal oriented/Leisure interest/Resourceful

## 2022-07-25 NOTE — ECT PRE-PROCEDURE CHECKLIST - NSPROPTRIGHTSUPPORTNAME_GEN_A_NUR
Kaylyn,  ( for 8 years, but remains a member of support system)
Kaylyn,  ( for 8 years, but remains a member of support system)

## 2022-07-25 NOTE — BH TREATMENT PLAN - NSTXDEPRESINTERSW_PSY_ALL_CORE
Discharge planning.  to liaison with collateral contacts & family as needed.

## 2022-07-25 NOTE — BH INPATIENT PSYCHIATRY PROGRESS NOTE - NSBHMETABOLIC_PSY_ALL_CORE_FT
BMI: BMI (kg/m2): 22.2 (07-25-22 @ 06:50)  HbA1c: A1C with Estimated Average Glucose Result: 5.5 % (07-19-22 @ 07:40)    Glucose:   BP: 136/89 (07-25-22 @ 09:00) (104/74 - 153/91)  Lipid Panel: Date/Time: 07-19-22 @ 07:40  Cholesterol, Serum: 202  Direct LDL: --  HDL Cholesterol, Serum: 85  Total Cholesterol/HDL Ration Measurement: --  Triglycerides, Serum: 106

## 2022-07-25 NOTE — BH TREATMENT PLAN - NSTXDEPRESINTERMD_PSY_ALL_CORE
effexor XR and abilify. Considering ECT
Continues on Effexor XR and Abilify. Two sessions of ECT so far.
effexor XR and abilify. Considering ECT

## 2022-07-25 NOTE — BH TREATMENT PLAN - NSTXPLANTHERAPYSESSIONSFT_PSY_ALL_CORE
07-24-22  Type of therapy: Creative arts therapy  Type of session: Group  Level of patient participation: Resistance to participation  --  Therapy conducted by: Psych rehab  Therapy Summary: Pt. continued to decline all invitations to groups of variosu modalities.

## 2022-07-25 NOTE — BH TREATMENT PLAN - NSDCCRITERIA_PSY_ALL_CORE
improved symptoms and continued low risk of harm to self

## 2022-07-25 NOTE — ECT TREATMENT NOTE - NSICDXBHSECONDARYDX_PSY_ALL_CORE
Severe episode of recurrent major depressive disorder, without psychotic features   F33.2  
Severe episode of recurrent major depressive disorder, without psychotic features   F33.2

## 2022-07-25 NOTE — BH TREATMENT PLAN - NSTXPATIENTPARTICIPATE_PSY_ALL_CORE
Patient participated in identification of needs/problems/goals for treatment
Patient participated in identification of needs/problems/goals for treatment/Patient participated in defining interventions
Patient participated in identification of needs/problems/goals for treatment

## 2022-07-25 NOTE — BH INPATIENT PSYCHIATRY PROGRESS NOTE - NSBHASSESSSUMMFT_PSY_ALL_CORE
This is a 66-year-old domiciled female with PPHx of depression, self-reported narcissistic and borderline personality disorders, 1PPH in 2012 for depression and received 8 unilateral ECT treatments per patient, no pSA/NSSIB, BIBS for worsening depressive symptoms since a mitral valve relapse surgery in 3/2022 and patient feeling overwhelmed/more anxious having had no prior medical ailments and then requiring a heart surgery this past March. Patient stable and has routine f/u and TTE in the fall of this year, no other PMH. No substance use history.     On initial assessment, patient is anxious and requesting to be discharged with improvement in her symptoms reported after changing Abilify to AM dosing (instead of PM) and this helping her sleep. Patient shares how distressing recent MVP repair was and how she has had difficulty dealing with this despite knowing she is well otherwise at this time and does not require significant medical management for this condition any longer. Suspect characterological components contributory to presentation and possible depressive symptoms (adjustment d/o with depressive and anxiety features versus MDD, r/o BPAD). Patient not acutely suicidal and low imminent risk and had recent medication changes in outpatient setting and has active outpatient care. Reassess tomorrow to see if ongoing mood stability and expand collateral with outpatient providers.     -07/18: patient feels better, but still endorses depressive symptoms. Denies SI/HI and AH/VH. She said she had a good response to ECT in the past and wants do pursue this treatment option. Will start referral process.  -07/19: patient still has depressive symptoms, expected to start ECT. She was medically cleared, but needs additional labs (ordered today). MOCA was performed and patient scored 23. She signed consent today.    -07/20: patient medically cleared for ECT today. Plan is to start ECT on Friday. Not endorsing  suicidal or homicidal ideation intent or plans at this moment. Still depressed and unable to properly focus.   ;;07/21: complains of anxiety and not "being able to do anything" ; sleep fair yet Not endorsing  suicidal or homicidal ideation intent or plans; no mention of auditory or visual hallucinations Alert; oriented; cognition intact; speech clear; no tremor or evidence of movement impairment.  ECT # RUL in am.  -07/22: first session of ECT performed today without complications or side effects (other than mild dizziness after the session). Patient was stable during the afternoon. Will monitor as treatment progresses.  ;;07/24: appetite and mood improved; transient sxs after ECT; discharged focused; ECT #2 in am RUL.  - 07/25 - Patient is showing initial improvement. Today she had the second session of ECT. No major side effect. Anxious about discharge.

## 2022-07-25 NOTE — BH TREATMENT PLAN - NSTXECTGOAL_PSY_ALL_CORE
Maintain NPO status as indicated prior to procedure
Maintain NPO status as indicated prior to procedure

## 2022-07-25 NOTE — BH TREATMENT PLAN - NSTXDEPRESGOAL_PSY_ALL_CORE
Will identify 2 coping skills that assist in improving mood
Will identify 2 coping skills that assist in improving mood
Other...

## 2022-07-25 NOTE — ECT TREATMENT NOTE - NSECTPOSTTXMEDSCOMMENTS_PSY_ALL_CORE
Brevital 60mg, Succ 50mg (inc motor at stim, ensure meds have perfused at next treatment)
Brevital 60mg, Succ 50mg

## 2022-07-25 NOTE — BH INPATIENT PSYCHIATRY PROGRESS NOTE - NSBHFUPINTERVALHXFT_PSY_A_CORE
Patient was interviewed in person. She reported worse tolerance to the ECT treatment this morning. She stated that it was “harder” than ECT treatment on Friday. However, she only reported mild headache and denied other side effects such as dizziness, which she reported after the last session. Overall, patient stated that she feels better but reported that her mood is worse than yesterday since she had the ECT treatment in the morning (she says she felt much better yesterday). She reported sleeping well last night. Patient denied suicidal ideation. She expressed desire to be discharged and to continue ECT on outpatient basis.

## 2022-07-25 NOTE — BH TREATMENT PLAN - NSTXDEPRESINTERRN_PSY_ALL_CORE
Encourage participation in groups to help build coping skills

## 2022-07-25 NOTE — ECT TREATMENT NOTE - NSECTPOSTTXSUMMARY_PSY_ALL_CORE
The patient had a well modified grand mal seizure under general anesthesia and a muscle relaxant.  The patient is alert, responsive, in no acute distress.  Recovery uneventful.     Future tx at 6xST (which was determined today)
The patient had a well modified grand mal seizure under general anesthesia and a muscle relaxant.  The patient is alert, responsive, in no acute distress.  Recovery uneventful.

## 2022-07-26 PROCEDURE — 99232 SBSQ HOSP IP/OBS MODERATE 35: CPT

## 2022-07-26 RX ADMIN — Medication 50 MILLIGRAM(S): at 20:21

## 2022-07-26 RX ADMIN — ARIPIPRAZOLE 10 MILLIGRAM(S): 15 TABLET ORAL at 10:02

## 2022-07-26 RX ADMIN — Medication 0.5 MILLIGRAM(S): at 20:21

## 2022-07-26 RX ADMIN — Medication 0.5 MILLIGRAM(S): at 15:33

## 2022-07-26 RX ADMIN — Medication 300 MILLIGRAM(S): at 10:02

## 2022-07-26 RX ADMIN — Medication 200 MILLIGRAM(S): at 15:34

## 2022-07-26 RX ADMIN — Medication 200 MILLIGRAM(S): at 16:15

## 2022-07-26 NOTE — BH PSYCHOLOGY - CLINICIAN PSYCHOTHERAPY NOTE - NSBHPSYCHOLNARRATIVE_PSY_A_CORE FT
DESCRIPTION: Balbina is a 66 year old domiciled female who presented with increased depressive symptoms and related agitation receiving ECT treatment.   SESSION CONTENT: Balbina reported improved mood and positive response to both ECT tx received so far with increased hopefulness and future-oriented goal-directed thinking. Balbina and this clinician discussed her interest in continuing ECT outpatient, discharging as soon as possible, and her concern over environmental factors on unit impacting her recovery. Balbina reported feeling comfortable with supports set up for d/c including outpatient provider, therapist, and friends. Balbina reiterated desire to leave as soon as possible and for this to be communicated to tx team, however was amenable to discussion over possible utility of receiving further ECT on inpatient basis.  BEHAVIORAL OBSERVATIONS: Balbina was observed to have decreased agitation, improved sociability, and hopeful towards improved progress. Balbina was narrowly focused on discharge plan and timeline.   RISK FACTORS:                          Static - hx of depressive episodes                          Modifiable - improving mood symptoms                          Protective - friends, outpatient providers, stable domicile  APPEARANCE:  [x] adequately groomed [] disheveled [] malodorous [] Other:   BEHAVIOR: [x] cooperative [] uncooperative [x] good EC [] poor EC [x] well related [] oddly related [] guarded []PMA [] PMR []abnormal movements [] Other:   SPEED: [x] normal rate/rhythm/volume [] loud [] quiet [] slow [] rapid [] pressured [] Other: _________   MOOD: [x] euthymic [] dysphoric [] anxious [] irritable [] Other: ___________   AFFECT: [x] full [] expansive [] constricted [] blunted [] flat [] stable [] labile [] Other: ________________ THOUGHT PROCESS: [x] organized [] disorganized [x] goal-directed [] concrete [] logical [] illogical   [] circumstantial [] tangential [] impoverished [] effusive [] repetitive [] Other:  THOUGHT CONTENT: [x] negative for delusions/suicidal ideation /homicidal ideation [] positive for delusions/suicidal ideation/homicidal ideation Describe:   PERCEPTION: [x] negative for auditory/ visual hallucinations [] positive for auditory/ visual hallucinations Describe: ________________________________________________________   INSIGHT/JUDGMENT: [] good [x]fair [] poor        IMPULSE CONTROL: [x] good []fair [] poor     COGNITION: [x] alert and oriented to person, time, place [] Lacks orientation to person/time/place. Describe: ___________________________   DESCRIPTION: Balbina is a 66-year-old domiciled,  female with Hx of depression and self-reported personality disorders, who self-presented with worsening depressive symptoms following heart surgery in March though without other clear precipitating. Balbina has received two ECT Tx while on unit with reported response of improved mood; she has declined further ECT Tx while inpatient.  SESSION CONTENT: Balbina reported improved mood and positive response to both ECT tx received so far with increased hopefulness and future-oriented goal-directed thinking. Balbina and this clinician discussed her interest in continuing ECT outpatient, discharging as soon as possible, and her concern over environmental factors on unit impacting her recovery. Balbina reported feeling comfortable with supports set up for d/c including outpatient provider, therapist, and friends. Balbina reiterated desire to leave as soon as possible and for this to be communicated to tx team, however was amenable to discussion over possible utility of receiving further ECT on inpatient basis.  BEHAVIORAL OBSERVATIONS: Balbina was observed to have decreased agitation, improved sociability, and hopeful towards improved progress. Balbina was narrowly focused on discharge plan and timeline.   RISK FACTORS:                          Static - hx of depressive episodes                          Modifiable - improving mood symptoms                          Protective - friends, outpatient providers, stable domicile  APPEARANCE:  [x] adequately groomed [] disheveled [] malodorous [] Other:   BEHAVIOR: [x] cooperative [] uncooperative [x] good EC [] poor EC [x] well related [] oddly related [] guarded []PMA [] PMR []abnormal movements [] Other:   SPEED: [x] normal rate/rhythm/volume [] loud [] quiet [] slow [] rapid [] pressured [] Other: _________   MOOD: [x] euthymic [] dysphoric [] anxious [] irritable [] Other: ___________   AFFECT: [x] full [] expansive [] constricted [] blunted [] flat [] stable [] labile [] Other: ________________ THOUGHT PROCESS: [x] organized [] disorganized [x] goal-directed [] concrete [] logical [] illogical   [] circumstantial [] tangential [] impoverished [] effusive [] repetitive [] Other:  THOUGHT CONTENT: [x] negative for delusions/suicidal ideation /homicidal ideation [] positive for delusions/suicidal ideation/homicidal ideation Describe:   PERCEPTION: [x] negative for auditory/ visual hallucinations [] positive for auditory/ visual hallucinations Describe: ________________________________________________________   INSIGHT/JUDGMENT: [] good [x]fair [] poor        IMPULSE CONTROL: [x] good []fair [] poor     COGNITION: [x] alert and oriented to person, time, place [] Lacks orientation to person/time/place. Describe: ___________________________

## 2022-07-26 NOTE — BH INPATIENT PSYCHIATRY PROGRESS NOTE - NSBHFUPINTERVALHXFT_PSY_A_CORE
ECT delayed because of need to complete medical clearance. Patient was cleared later during the day. She complains of depressive symptoms. Has been more inattentive (made error on MOCA test and also was noticed to be inattentive in groups by therapists). She is anxious about doing ECT as soon as possible. Patient was seen and evaluated today in person. She says she feels better today and that she is able to enjoy life again. She does not want to continue ECT tomorrow because she believe she has improved. Her plan is to perform outpatient ECT in the event that she worsens after discharge. She denies SI/HI and AH/VH.

## 2022-07-26 NOTE — BH DISCHARGE NOTE NURSING/SOCIAL WORK/PSYCH REHAB - NSDCPRGOAL_PSY_ALL_CORE
Pt has attended select groups over her hospitalization, isolating at times, but appearing more visible and engaged in groups prior to discharge. Pt reports improvement in mood following ECT treatments. Pt endorses difficulty accepting the recent bout with depression without being able to identify a clear a trigger, but acknowledges some stress associated with her heart surgery earlier this year. Pt appears brighter, is well related, and appears future oriented. Pt is looking forward to discharge but also endorses some anxiety about leaving. Pt denies any SI. Pt completed a safety plan and received a copy to take with her.

## 2022-07-26 NOTE — BH INPATIENT PSYCHIATRY PROGRESS NOTE - NSBHMETABOLIC_PSY_ALL_CORE_FT
BMI: BMI (kg/m2): 22.2 (07-25-22 @ 06:50)  HbA1c: A1C with Estimated Average Glucose Result: 5.5 % (07-19-22 @ 07:40)    Glucose:   BP: 127/85 (07-26-22 @ 08:33) (111/79 - 153/91)  Lipid Panel: Date/Time: 07-19-22 @ 07:40  Cholesterol, Serum: 202  Direct LDL: --  HDL Cholesterol, Serum: 85  Total Cholesterol/HDL Ration Measurement: --  Triglycerides, Serum: 106   BMI: BMI (kg/m2): 22.2 (07-25-22 @ 06:50)  HbA1c: A1C with Estimated Average Glucose Result: 5.5 % (07-19-22 @ 07:40)    Glucose:   BP: 127/87 (07-26-22 @ 17:43) (111/79 - 153/91)  Lipid Panel: Date/Time: 07-19-22 @ 07:40  Cholesterol, Serum: 202  Direct LDL: --  HDL Cholesterol, Serum: 85  Total Cholesterol/HDL Ration Measurement: --  Triglycerides, Serum: 106

## 2022-07-26 NOTE — BH SAFETY PLAN - WARNING SIGN 1
Pt completed a written safety and received a copy to take with her. An additional copy has been filed in Pt's chart on the unit.

## 2022-07-26 NOTE — BH INPATIENT PSYCHIATRY PROGRESS NOTE - NSBHCHARTREVIEWVS_PSY_A_CORE FT
Vital Signs Last 24 Hrs  T(C): 37 (07-26-22 @ 08:33), Max: 37.1 (07-25-22 @ 16:59)  T(F): 98.6 (07-26-22 @ 08:33), Max: 98.8 (07-25-22 @ 16:59)  HR: 111 (07-26-22 @ 08:33) (111 - 116)  BP: 127/85 (07-26-22 @ 08:33) (127/85 - 137/90)  BP(mean): --  RR: 18 (07-26-22 @ 08:33) (18 - 18)  SpO2: 99% (07-26-22 @ 08:33) (98% - 99%)     Vital Signs Last 24 Hrs  T(C): 37 (07-26-22 @ 17:43), Max: 37 (07-26-22 @ 08:33)  T(F): 98.6 (07-26-22 @ 17:43), Max: 98.6 (07-26-22 @ 08:33)  HR: 110 (07-26-22 @ 17:43) (110 - 111)  BP: 127/87 (07-26-22 @ 17:43) (127/85 - 127/87)  BP(mean): --  RR: 18 (07-26-22 @ 17:43) (18 - 18)  SpO2: 99% (07-26-22 @ 17:43) (99% - 99%)

## 2022-07-26 NOTE — BH DISCHARGE NOTE NURSING/SOCIAL WORK/PSYCH REHAB - NSDCADDINFO1FT_PSY_ALL_CORE
You are scheduled to attend a virtual therapy appointment with your established therapist, Marianne Pratt, on WEDNESDAY, AUGUST 3,  2022 at 4:15PM.  You are scheduled to attend a VIRTUAL Zoom therapy appointment with your established therapist, Marianne Pratt, on WEDNESDAY, AUGUST 3,  2022 at 4:15PM.

## 2022-07-26 NOTE — BH INPATIENT PSYCHIATRY PROGRESS NOTE - NSBHASSESSSUMMFT_PSY_ALL_CORE
This is a 66-year-old domiciled female with PPHx of depression, self-reported narcissistic and borderline personality disorders, 1PPH in 2012 for depression and received 8 unilateral ECT treatments per patient, no pSA/NSSIB, BIBS for worsening depressive symptoms since a mitral valve relapse surgery in 3/2022 and patient feeling overwhelmed/more anxious having had no prior medical ailments and then requiring a heart surgery this past March. Patient stable and has routine f/u and TTE in the fall of this year, no other PMH. No substance use history.     On initial assessment, patient is anxious and requesting to be discharged with improvement in her symptoms reported after changing Abilify to AM dosing (instead of PM) and this helping her sleep. Patient shares how distressing recent MVP repair was and how she has had difficulty dealing with this despite knowing she is well otherwise at this time and does not require significant medical management for this condition any longer. Suspect characterological components contributory to presentation and possible depressive symptoms (adjustment d/o with depressive and anxiety features versus MDD, r/o BPAD). Patient not acutely suicidal and low imminent risk and had recent medication changes in outpatient setting and has active outpatient care. Reassess tomorrow to see if ongoing mood stability and expand collateral with outpatient providers.     -07/18: patient feels better, but still endorses depressive symptoms. Denies SI/HI and AH/VH. She said she had a good response to ECT in the past and wants do pursue this treatment option. Will start referral process.  -07/19: patient still has depressive symptoms, expected to start ECT. She was medically cleared, but needs additional labs (ordered today). MOCA was performed and patient scored 23. She signed consent today.    -07/20: patient medically cleared for ECT today. Plan is to start ECT on Friday. Not endorsing  suicidal or homicidal ideation intent or plans at this moment. Still depressed and unable to properly focus.   ;;07/21: complains of anxiety and not "being able to do anything" ; sleep fair yet Not endorsing  suicidal or homicidal ideation intent or plans; no mention of auditory or visual hallucinations Alert; oriented; cognition intact; speech clear; no tremor or evidence of movement impairment.  ECT # RUL in am.  -07/22: first session of ECT performed today without complications or side effects (other than mild dizziness after the session). Patient was stable during the afternoon. Will monitor as treatment progresses.  ;;07/24: appetite and mood improved; transient sxs after ECT; discharged focused; ECT #2 in am RUL.  - 07/25 - Patient is showing initial improvement. Today she had the second session of ECT. No major side effect. Anxious about discharge. This is a 66-year-old domiciled female with PPHx of depression, self-reported narcissistic and borderline personality disorders, 1PPH in 2012 for depression and received 8 unilateral ECT treatments per patient, no pSA/NSSIB, BIBS for worsening depressive symptoms since a mitral valve relapse surgery in 3/2022 and patient feeling overwhelmed/more anxious having had no prior medical ailments and then requiring a heart surgery this past March. Patient stable and has routine f/u and TTE in the fall of this year, no other PMH. No substance use history.     On initial assessment, patient is anxious and requesting to be discharged with improvement in her symptoms reported after changing Abilify to AM dosing (instead of PM) and this helping her sleep. Patient shares how distressing recent MVP repair was and how she has had difficulty dealing with this despite knowing she is well otherwise at this time and does not require significant medical management for this condition any longer. Suspect characterological components contributory to presentation and possible depressive symptoms (adjustment d/o with depressive and anxiety features versus MDD, r/o BPAD). Patient not acutely suicidal and low imminent risk and had recent medication changes in outpatient setting and has active outpatient care. Reassess tomorrow to see if ongoing mood stability and expand collateral with outpatient providers.     -07/18: patient feels better, but still endorses depressive symptoms. Denies SI/HI and AH/VH. She said she had a good response to ECT in the past and wants do pursue this treatment option. Will start referral process.  -07/19: patient still has depressive symptoms, expected to start ECT. She was medically cleared, but needs additional labs (ordered today). MOCA was performed and patient scored 23. She signed consent today.    -07/20: patient medically cleared for ECT today. Plan is to start ECT on Friday. Not endorsing  suicidal or homicidal ideation intent or plans at this moment. Still depressed and unable to properly focus.   ;;07/21: complains of anxiety and not "being able to do anything" ; sleep fair yet Not endorsing  suicidal or homicidal ideation intent or plans; no mention of auditory or visual hallucinations Alert; oriented; cognition intact; speech clear; no tremor or evidence of movement impairment.  ECT # RUL in am.  -07/22: first session of ECT performed today without complications or side effects (other than mild dizziness after the session). Patient was stable during the afternoon. Will monitor as treatment progresses.  ;;07/24: appetite and mood improved; transient sxs after ECT; discharged focused; ECT #2 in am RUL.  - 07/25 - Patient is showing initial improvement. Today she had the second session of ECT. No major side effect. Anxious about discharge.  - 07/26 - Patient shows additional improvement today, reports better mood and capacity to enjoy life. No major side effect. Does not want to continue ECT tomorrow. Anxious about discharge (wants to resume ECT as outpatient if she worsens after discharge).

## 2022-07-26 NOTE — BH DISCHARGE NOTE NURSING/SOCIAL WORK/PSYCH REHAB - PATIENT PORTAL LINK FT
You can access the FollowMyHealth Patient Portal offered by [facility] by registering at the following website: [email or web address]. By joining [facility]’s FollowMyHealth portal, you will also be able to view your health information using other applications (apps) compatible with our system.

## 2022-07-26 NOTE — BH INPATIENT PSYCHIATRY PROGRESS NOTE - NSBHATTESTCOMMENTATTENDFT_PSY_A_CORE
Recommendations: ECT #3 RUL; consult with Dr. Weber regarding outpatient ECT; consider d/c on 7/28 if stable;     Brief hx: 66-year-old domiciled female with PPHx of depression, self-reported narcissistic and borderline personality disorders, 1PPH in 2012 for depression and received 8 unilateral ECT treatments per patient, no pSA/NSSIB, BIBS for worsening depressive symptoms since a mitral valve relapse surgery in 3/2022 and patient feeling overwhelmed/more anxious having had no prior medical ailments and then requiring a heart surgery this past March. Patient stable and has routine f/u and TTE in the fall of this year, no other PMH. No substance use history.    ---xyx   ;;07/26: Not endorsing  suicidal or homicidal ideation intent or plans; no mention of auditory or visual hallucinations "Do I have to do ECT after discharge?"; mood and affect improved.  Recommendations: ECT #3 RUL; consult with Dr. eWber regarding outpatient ECT; consider d/c on 7/28 if stable;     Brief hx: 66-year-old domiciled female with PPHx of depression, self-reported narcissistic and borderline personality disorders, 1PPH in 2012 for depression and received 8 unilateral ECT treatments per patient, no pSA/NSSIB, BIBS for worsening depressive symptoms since a mitral valve relapse surgery in 3/2022 and patient feeling overwhelmed/more anxious having had no prior medical ailments and then requiring a heart surgery this past March. Patient stable and has routine f/u and TTE in the fall of this year, no other PMH. No substance use history.    ---xyx   ;;07/26: Not endorsing  suicidal or homicidal ideation intent or plans; no mention of auditory or visual hallucinations "Do I have to do ECT after discharge?"; mood and affect improved.   Later in the day stated that pt did not wish any more ECT and wants to leave as soon as possible.

## 2022-07-26 NOTE — BH DISCHARGE NOTE NURSING/SOCIAL WORK/PSYCH REHAB - NSDCADDINFO2FT_PSY_ALL_CORE
You are scheduled to attend a VIRTUAL Zoom appointment with your established psychiatrist, Dr. Jamari Saldaña, on THURSDAY, AUGUST 4, 2022 at 9:30AM.

## 2022-07-26 NOTE — BH DISCHARGE NOTE NURSING/SOCIAL WORK/PSYCH REHAB - NSCDUDCCRISIS_PSY_A_CORE
Counts include 234 beds at the [facility] Well  1 (532) Counts include 234 beds at the [facility]-WELL ([phone])  Text "WELL" to 68619  Website: www.[employer]/.Safe Horizons 1 (540) 481-CJEG (0906) Website: www.safehorizon.org/.National Suicide Prevention Lifeline 5 (000) 169-7684/.  [facility]  1 (251) LIFENET (547-9173)/.  [facility]’s Behavior[facility] Crisis [facility]  [address] [place] [address] [phone]   Hours:  Monday through Friday from 9 AM to 3 PM/.  [place] Dept of  Affairs - Veterans Crisis Line  5 [phone] 336-7532, Option 1 [facility] Well  1 (116) [facility]-WELL ([phone])  Text "WELL" to 71511  Website: www.[employer]/.Safe Horizons 1 (239) 881-NSKF (4473) Website: www.safehorizon.org/.National Suicide Prevention Lifeline 1 (226) 807-6380/.  [facility]  1 (674) LIFENET (401-0090)/.  [facility]’s Behavioral Health Crisis Center  [address] [place] [address] [phone]   Hours:  Monday through Friday from 9 AM to 3 PM Critical access hospital Well  1 (786) Critical access hospital-WELL ([phone])  Text "WELL" to 53640  Website: www.[employer]/.Safe Horizons 1 (560) 311-NNEV (5500) Website: www.safehorizon.org/.National Suicide Prevention Lifeline 0 (408) 164-4224/.  [facility]  1 (976) LIFENET (204-8675)/.  [facility]’s Behavioral [facility] Crisis [facility]  [address] [place] [address] [phone]   Hours:  Monday through Friday from 9 AM to 3 PM/.  [place].S. Dept of  Affairs - Veterans Crisis Line  1 [phone] [phone], Option 1

## 2022-07-26 NOTE — BH DISCHARGE NOTE NURSING/SOCIAL WORK/PSYCH REHAB - NSDPDISTO_PSY_ALL_CORE
The patient will return to her apartment: [address], [address], [place] [address]./Home

## 2022-07-27 RX ORDER — TRAZODONE HCL 50 MG
1 TABLET ORAL
Qty: 30 | Refills: 0
Start: 2022-07-27 | End: 2022-08-25

## 2022-07-27 RX ORDER — VENLAFAXINE HCL 75 MG
2 CAPSULE, EXT RELEASE 24 HR ORAL
Qty: 60 | Refills: 0
Start: 2022-07-27 | End: 2022-08-25

## 2022-07-27 RX ORDER — ARIPIPRAZOLE 15 MG/1
1 TABLET ORAL
Qty: 30 | Refills: 0
Start: 2022-07-27 | End: 2022-08-25

## 2022-07-27 RX ADMIN — Medication 200 MILLIGRAM(S): at 11:30

## 2022-07-27 RX ADMIN — Medication 0.5 MILLIGRAM(S): at 15:47

## 2022-07-27 RX ADMIN — Medication 200 MILLIGRAM(S): at 10:39

## 2022-07-27 RX ADMIN — Medication 50 MILLIGRAM(S): at 20:50

## 2022-07-27 RX ADMIN — Medication 300 MILLIGRAM(S): at 09:27

## 2022-07-27 RX ADMIN — Medication 0.5 MILLIGRAM(S): at 20:50

## 2022-07-27 RX ADMIN — ARIPIPRAZOLE 10 MILLIGRAM(S): 15 TABLET ORAL at 09:27

## 2022-07-27 NOTE — BH INPATIENT PSYCHIATRY DISCHARGE NOTE - REASON FOR ADMISSION
Patient was hospitalized for worsening depressive symptoms (low mood, restless sleep (<6h/night), reduced appetite, low energy, amotivation, anhedonia, and hopelessness) since a mitral valve relapse surgery in 3/2022 and feeling overwhelmed/more anxious.

## 2022-07-27 NOTE — BH INPATIENT PSYCHIATRY PROGRESS NOTE - NSBHFUPINTERVALHXFT_PSY_A_CORE
Patient was interviewed in person for follow up. She has brighter affect and was smiling throughout the interview. She reported improvement of anhedonia and discussed return of her interests in reading articles from the [employer] as well as other sources. Patient was able to discuss topic of interests in depth. 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 Moreover, the patient reported excitement and nervousness about being discharge after hospitalization on the unit. She reported sleeping and eating well. Denied anxiety. Denied SI.

## 2022-07-27 NOTE — BH INPATIENT PSYCHIATRY DISCHARGE NOTE - HOSPITAL COURSE
The patient is a 66-year-old domiciled female with PPHx of depression, self-reported narcissistic and borderline personality disorders, 1PPH in 2012 for depression and received 8 unilateral ECT treatments per patient, no pSA/NSSIB, BIBS for worsening depressive symptoms since a mitral valve prolapse surgery in 3/2022 and patient feeling overwhelmed/more anxious having had no prior medical ailments and then requiring a heart surgery this past March. Patient stable and has routine f/u and TTE in the fall of this year, no other PMH. No substance use history. On initial assessment, patient was anxious, requesting to be discharged, and showed distress regarding her recent MVP repair. She was not suicidal upon admission. Because of persisting depressive symptoms in the context of a high-dose antidepressant medication regimen (Effexor 300 mg + Abilify 10 mg), the patient was referred for inpatient ECT. She was initially eager to have it and was anxious about doing it as soon as possible, claiming that she had a good response to it in the past. She was medically cleared, MOCA was performed (patient scored 23) and she signed consent. She started ECT on 07/21. Patient had no significant side effects. She reported improving mood after two sessions of ECT and became discharge focused. After the second session she began reporting better mood and capacity to enjoy life. However, she did not want to have further ECT sessions and wanted to be discharge to continue her treatment as outpatient. She was educated about the regular treatment course of ECT and the need for further sessions per protocol and the risk of relapse with early discontinuation of treatment. She decided to pursue outpatient ECT if she noticed that she would relapse. Patient is being discharged today, with improving symptoms (residual symptoms to be addressed as outpatient) and denying SI.

## 2022-07-27 NOTE — BH INPATIENT PSYCHIATRY PROGRESS NOTE - NSDCCRITERIA_PSY_ALL_CORE
improved symptoms and continued low risk of harm to self

## 2022-07-27 NOTE — BH INPATIENT PSYCHIATRY PROGRESS NOTE - PRN MEDS
MEDICATIONS  (PRN):  acetaminophen     Tablet .. 650 milliGRAM(s) Oral every 6 hours PRN Moderate Pain (4 - 6)  ibuprofen  Tablet. 200 milliGRAM(s) Oral every 6 hours PRN back pain  LORazepam     Tablet 0.5 milliGRAM(s) Oral three times a day PRN Anxiety  polyethylene glycol 3350 17 Gram(s) Oral at bedtime PRN Constipation   MEDICATIONS  (PRN):  acetaminophen     Tablet .. 650 milliGRAM(s) Oral every 6 hours PRN Moderate Pain (4 - 6)  ibuprofen  Tablet. 200 milliGRAM(s) Oral every 6 hours PRN back pain  LORazepam     Tablet 0.5 milliGRAM(s) Oral two times a day PRN Anxiety  polyethylene glycol 3350 17 Gram(s) Oral at bedtime PRN Constipation

## 2022-07-27 NOTE — BH INPATIENT PSYCHIATRY PROGRESS NOTE - NSBHMETABOLIC_PSY_ALL_CORE_FT
BMI: BMI (kg/m2): 22.2 (07-25-22 @ 06:50)  HbA1c: A1C with Estimated Average Glucose Result: 5.5 % (07-19-22 @ 07:40)    Glucose:   BP: 121/84 (07-27-22 @ 08:35) (111/79 - 153/91)  Lipid Panel: Date/Time: 07-19-22 @ 07:40  Cholesterol, Serum: 202  Direct LDL: --  HDL Cholesterol, Serum: 85  Total Cholesterol/HDL Ration Measurement: --  Triglycerides, Serum: 106

## 2022-07-27 NOTE — BH INPATIENT PSYCHIATRY PROGRESS NOTE - NSBHMSEKNOWHOW_PSY_ALL_CORE
Current Events

## 2022-07-27 NOTE — BH PSYCHOLOGY - CLINICIAN PSYCHOTHERAPY NOTE - NSBHPSYCHOLINT_PSY_A_CORE
Supportive therapy/Treatment compliance encouraged
Problem-solving techniques discussed/Supportive therapy
Supported coping skills

## 2022-07-27 NOTE — BH INPATIENT PSYCHIATRY PROGRESS NOTE - NSICDXBHPRIMARYDX_PSY_ALL_CORE
Depression, recurrent   F33.9  

## 2022-07-27 NOTE — BH INPATIENT PSYCHIATRY PROGRESS NOTE - NSBHMSEREMMEM_PSY_A_CORE
recall 4/5 per MOCA/Impaired
Normal
recall 4/5 per MOCA/Impaired
Normal

## 2022-07-27 NOTE — BH INPATIENT PSYCHIATRY PROGRESS NOTE - NSBHADMITMEDEDUDETAILS_PSY_A_CORE FT
ARIPiprazole 10 milliGRAM(s) Oral daily augmenting venlafaxine  milliGRAM(s) Oral daily  

## 2022-07-27 NOTE — BH INPATIENT PSYCHIATRY PROGRESS NOTE - NSTXDEPRESDATETRGT_PSY_ALL_CORE
25-Jul-2022
31-Jul-2022
25-Jul-2022
31-Jul-2022
31-Jul-2022

## 2022-07-27 NOTE — BH INPATIENT PSYCHIATRY DISCHARGE NOTE - NSBHDCHANDOFFFT_PSY_ALL_CORE
MSW arranged aftercare and gave pertinent information.  Discharge summary to be sent to next provider with contact information needed for any further handoff.

## 2022-07-27 NOTE — BH INPATIENT PSYCHIATRY DISCHARGE NOTE - NSBHMETABOLIC_PSY_ALL_CORE_FT
BMI: BMI (kg/m2): 22.2 (07-25-22 @ 06:50)  HbA1c: A1C with Estimated Average Glucose Result: 5.5 % (07-19-22 @ 07:40)    Glucose:   BP: 121/84 (07-27-22 @ 08:35) (111/79 - 153/91)  Lipid Panel: Date/Time: 07-19-22 @ 07:40  Cholesterol, Serum: 202  Direct LDL: --  HDL Cholesterol, Serum: 85  Total Cholesterol/HDL Ration Measurement: --  Triglycerides, Serum: 106   BMI: BMI (kg/m2): 22.2 (07-25-22 @ 06:50)  HbA1c: A1C with Estimated Average Glucose Result: 5.5 % (07-19-22 @ 07:40)  A1C with Estimated Average Glucose in AM (07.19.22 @ 07:40)    A1C with Estimated Average Glucose Result: 5.5: Reference Range                 4.0-5.6%       High risk (prediabetic)        5.7-6.4%       Diabetic, diagnostic             >=6.5%       ADA diabetic treatment goal       <7.0%  Reference ranges are based upon the 2010 recommendations of the American  Diabetes Association.  Interpretation may vary for children and  adolescents. %    Estimated Average Glucose: 111 mg/dL    Glucose:   BP: 121/84 (07-27-22 @ 08:35) (111/79 - 153/91)  Lipid Panel: Date/Time: 07-19-22 @ 07:40  Cholesterol, Serum: 202  Direct LDL: --  HDL Cholesterol, Serum: 85  Total Cholesterol/HDL Ration Measurement: --  Triglycerides, Serum: 106

## 2022-07-27 NOTE — BH INPATIENT PSYCHIATRY DISCHARGE NOTE - DESCRIPTION
Was formerly employed as [employer] but has not published recently. Lives alone in apartment.  from ex- but not formerly . Denies substance use.

## 2022-07-27 NOTE — BH INPATIENT PSYCHIATRY DISCHARGE NOTE - ATTENDING DISCHARGE PHYSICAL EXAMINATION:
;;07/28: Future oriented; looks forward to walking in the park; Not endorsing  suicidal or homicidal ideation intent or plans; no mention of auditory or visual hallucinations Alert; oriented; cognition intact; speech clear; no tremor or evidence of movement impairment. i&j fair to poor : aware of medications and acknowledges symptoms but not reflective in a meaningful way  on issues that impact on symptoms. Focused on discharge; wants to leave. Thinking is congruent with affect; no peculiarities of thinking or language use. Fair to good eye contact; speech clear spontaneous and normal volume .  Affect anxious and mood anxious with focus on leaving.

## 2022-07-27 NOTE — BH INPATIENT PSYCHIATRY DISCHARGE NOTE - HPI (INCLUDE ILLNESS QUALITY, SEVERITY, DURATION, TIMING, CONTEXT, MODIFYING FACTORS, ASSOCIATED SIGNS AND SYMPTOMS)
This is a 66-year-old domiciled female with PPHx of depression, narcissistic and borderline personality disorders, 1PPH in 2012 for depression and received 8 unilateral ECT treatments per patient, no pSA/NSSIB, BIBS for worsening depressive symptoms since a mitral valve relapse surgery in 3/2022 and patient feeling overwhelmed/more anxious having had no prior medical ailments and then requiring a heart surgery this past March. Patient stable and has routine f/u and TTE in the fall of this year, no other PMH. No substance use history.     Patient describes worsening depressive symptoms (low mood, restless sleep (<6h/night), reduced appetite, low energy, amotivation, anhedonia, and hopelessness in context of recent findings of mitral valve prolapse this year and required surgical correction in 3/2022. Patient describes this drastic change and medical ailment to be distressing and lead to worsening depressive symptoms. She does not require further medical management (other than routine cardiology f/u) and is not on any heart medications and has no PMH otherwise. Her outpatient psychiatrist increased Effexor from 225 to 300mg 3d prior to admission, and Abilify from 5 to 10mg on same timeline (corrected timeline compared to initial note). Patient reports being on Effexor for years with good effect at 75mg and psychiatrist only increased post-op period in 3/2022 to 225 and then again more recently I/c/o worsening depressive symptoms. Abilify was added on as an adjunctive medication around this time and last increase as described 3d ago. Patient notes that she was not sleeping, feeling very anxious, and was taking Abilify in the evenings, dose changed to AM dosing yesterday and she slept better and is hopeful about the medication changes having an effect, although shares insight into timeline for this can be 4+weeks. She denies SI and did not have active suicidal ideation prior to admission or since. No overt delusional content elicited. No manic or hypomanic symptoms noted. Discussed plan to continue current medications and monitor patient. Patient is eager to be discharged sharing discomfort on inpatient unit and provided psychoeducation on medications, timeline for full benefit with Effexor change recently, need for closer f/u on discharge. Patient in communication with her therapist who is helping to reach psychiatrist (on vacation) to coordinate a f/u appt.     No acute medical complaints. Labs and EKG unremarkable on review.  66 year old F, domiciled, past medical history of surgically repaired MVP in March 2022, past psychiatric history of narcissistic & borderline personality disorders, MDD, h/o 1 inpatient psychiatric hospitalization & ECT ~10 years ago, no history of suicide attempts or self-harm, presenting for worsening depressive symptoms since March 2022 which have not responded to outpatient treatment and medication management.     Patient was lying down in stretcher, calmly engaged in conversation with 1:1 staff upon entry of this writer for psychiatric evaluation. Patient reports she has been feeling "super depressed" since undergoing surgery for mitral valve prolapse in March 2022. Reports she "can't take it anymore" and that her emotional state has impacted her ability to do things she enjoys such as reading. Has not been motivated to remain engaged socially (which she was doing earlier in the year). Also has felt decrease in appetite, which requires her to be more vigilant of her food intake to ensure that she eats enough. Reports that she has discussed escalating depressed feelings with her psychiatrist, which has resulted in outpatient uptitration of her psychiatric medications.Reports that 3 days ago, her venlafaxine was raised to 300 mg po qday (had been taking 75 mg for years, uptitrated to 225 mg po qday 2 months ago) and abilify was increased to 10 mg po qhs (initiated at 5 mg po qhs in June). Also takes trazodone 50 mg po qhs and up to 3 mg of PRN lorazepam per day (on average, takes total of 1.5 mg per day). Reports that she started taking lorazepam after her MVP surgery in March, and that she had no history of regularly taking benzodiazepines prior to 2022. Of note, patient reports that her sleep has gotten worse since she started abilify (difficulty falling asleep and early morning awakenings). Takes abilify at night and has never taken it during the daytime.     Despite the medication changes described, patient reports that her depressed mood has continued to escalate. She reports feeling dejected for the majority of the day and has started looking into outpatient ECT options, since she found ECT helpful when she received it 10 years ago. Denies SI/HI/AVH. Denies thoughts of wanting to harm or kill self. Denies history of cutting self in the past or present. Denies history of episodes of decreased need for sleep or episodic changes in personality/behavior/impulsivity.

## 2022-07-27 NOTE — BH INPATIENT PSYCHIATRY DISCHARGE NOTE - NSDCMRMEDTOKEN_GEN_ALL_CORE_FT
ARIPiprazole 10 mg oral tablet: 1 tab(s) orally once a day  traZODone 50 mg oral tablet: 1 tab(s) orally once a day (at bedtime)  venlafaxine 150 mg oral capsule, extended release: 2 cap(s) orally once a day

## 2022-07-27 NOTE — BH INPATIENT PSYCHIATRY PROGRESS NOTE - NSBHFUPINTERVALCCFT_PSY_A_CORE
Depressive symptoms
Depressive symptoms.
Depressive symptoms.
Depressive symptoms including poor appetite and lack of motivation (anhedonia). 
Depressive symptoms
Depressive symptoms.
Depressive symptoms including poor appetite and lack of motivation (anhedonia).
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
## 2022-07-27 NOTE — BH INPATIENT PSYCHIATRY PROGRESS NOTE - NSTXECTINTERMD_PSY_ALL_CORE
2 sessions so far. Seems to present an initial improvement.
2 sessions so far. Seems to present an initial improvement.

## 2022-07-27 NOTE — BH INPATIENT PSYCHIATRY PROGRESS NOTE - NSTXDEPRESDATEEST_PSY_ALL_CORE
24-Jul-2022
18-Jul-2022
18-Jul-2022
24-Jul-2022
18-Jul-2022
18-Jul-2022
24-Jul-2022
18-Jul-2022

## 2022-07-27 NOTE — BH INPATIENT PSYCHIATRY PROGRESS NOTE - NSBHMSETHTPROC_PSY_A_CORE
Linear/Normal reasoning

## 2022-07-27 NOTE — BH INPATIENT PSYCHIATRY PROGRESS NOTE - NSBHATTESTCOMMENTATTENDFT_PSY_A_CORE
Recommendations: d/c in am and instruct patient to monitor sxs with outpatient care provider; continue current medications.       Brief hx: 66-year-old domiciled female with PPHx of depression, self-reported narcissistic and borderline personality disorders, 1PPH in 2012 for depression and received 8 unilateral ECT treatments per patient, no pSA/NSSIB, BIBS for worsening depressive symptoms since a mitral valve relapse surgery in 3/2022 and patient feeling overwhelmed/more anxious having had no prior medical ailments and then requiring a heart surgery this past March. Patient stable and has routine f/u and TTE in the fall of this year, no other PMH. No substance use history.    ---xyx   ;;07/27: Mostly focused on leaving.  Future oriented; looks forward to taking a walk in the park.  Sleep  appetite ok; no pain issues. Not endorsing  suicidal or homicidal ideation intent or plans; no mention of auditory or visual hallucinations  Thinking is congruent with affect; no pecularities of thinking or language use. Fair to good eye contact; speech clear spontaneous and normal volume .  Anxious ; ij : fair; limited reflection on issues .

## 2022-07-27 NOTE — BH INPATIENT PSYCHIATRY DISCHARGE NOTE - NSDCCPCAREPLAN_GEN_ALL_CORE_FT
PRINCIPAL DISCHARGE DIAGNOSIS  Diagnosis: Depression, major  Assessment and Plan of Treatment:

## 2022-07-27 NOTE — BH PSYCHOLOGY - CLINICIAN PSYCHOTHERAPY NOTE - NSTXDEPRESGOAL_PSY_ALL_CORE
Will identify 2 coping skills that assist in improving mood
Other...
Will identify 2 coping skills that assist in improving mood

## 2022-07-27 NOTE — BH INPATIENT PSYCHIATRY PROGRESS NOTE - NSICDXBHSECONDARYDX_PSY_ALL_CORE
Severe episode of recurrent major depressive disorder, without psychotic features   F33.2  

## 2022-07-27 NOTE — BH INPATIENT PSYCHIATRY PROGRESS NOTE - NSBHMSEATTEN_PSY_A_CORE
Mildly impaired. Performed MOCA test and scored 23, mostly because of attention errors./Impaired Normal

## 2022-07-27 NOTE — BH INPATIENT PSYCHIATRY PROGRESS NOTE - NSBHCHARTREVIEWVS_PSY_A_CORE FT
Vital Signs Last 24 Hrs  T(C): 36.6 (07-27-22 @ 08:35), Max: 37 (07-26-22 @ 17:43)  T(F): 97.9 (07-27-22 @ 08:35), Max: 98.6 (07-26-22 @ 17:43)  HR: 111 (07-27-22 @ 08:35) (110 - 111)  BP: 121/84 (07-27-22 @ 08:35) (121/84 - 127/87)  BP(mean): --  RR: 18 (07-27-22 @ 08:35) (18 - 18)  SpO2: 99% (07-27-22 @ 08:35) (99% - 99%)

## 2022-07-27 NOTE — BH INPATIENT PSYCHIATRY PROGRESS NOTE - NSTXDEPRESDATENEW_PSY_ALL_CORE
16-Jul-2022

## 2022-07-27 NOTE — BH INPATIENT PSYCHIATRY PROGRESS NOTE - NSTXDEPRESGOAL_PSY_ALL_CORE
Other...
Will identify 2 coping skills that assist in improving mood
Other...
Will identify 2 coping skills that assist in improving mood
Will identify 2 coping skills that assist in improving mood
Other...
Will identify 2 coping skills that assist in improving mood

## 2022-07-27 NOTE — BH INPATIENT PSYCHIATRY PROGRESS NOTE - NSTXDEPRESINTERMD_PSY_ALL_CORE
effexor XR and abilify. Considering ECT
effexor XR and abilify. Considering ECT
Continues on Effexor XR and Abilify. Two sessions of ECT so far.
effexor XR and abilify. Considering ECT
Continues on Effexor XR and Abilify. Two sessions of ECT so far.
effexor XR and abilify. Considering ECT

## 2022-07-27 NOTE — BH INPATIENT PSYCHIATRY PROGRESS NOTE - NSBHASSESSSUMMFT_PSY_ALL_CORE
This is a 66-year-old domiciled female with PPHx of depression, self-reported narcissistic and borderline personality disorders, 1PPH in 2012 for depression and received 8 unilateral ECT treatments per patient, no pSA/NSSIB, BIBS for worsening depressive symptoms since a mitral valve relapse surgery in 3/2022 and patient feeling overwhelmed/more anxious having had no prior medical ailments and then requiring a heart surgery this past March. Patient stable and has routine f/u and TTE in the fall of this year, no other PMH. No substance use history.     On initial assessment, patient is anxious and requesting to be discharged with improvement in her symptoms reported after changing Abilify to AM dosing (instead of PM) and this helping her sleep. Patient shares how distressing recent MVP repair was and how she has had difficulty dealing with this despite knowing she is well otherwise at this time and does not require significant medical management for this condition any longer. Suspect characterological components contributory to presentation and possible depressive symptoms (adjustment d/o with depressive and anxiety features versus MDD, r/o BPAD). Patient not acutely suicidal and low imminent risk and had recent medication changes in outpatient setting and has active outpatient care. Reassess tomorrow to see if ongoing mood stability and expand collateral with outpatient providers.     -07/18: patient feels better, but still endorses depressive symptoms. Denies SI/HI and AH/VH. She said she had a good response to ECT in the past and wants do pursue this treatment option. Will start referral process.  -07/19: patient still has depressive symptoms, expected to start ECT. She was medically cleared, but needs additional labs (ordered today). MOCA was performed and patient scored 23. She signed consent today.    -07/20: patient medically cleared for ECT today. Plan is to start ECT on Friday. Not endorsing  suicidal or homicidal ideation intent or plans at this moment. Still depressed and unable to properly focus.   ;;07/21: complains of anxiety and not "being able to do anything" ; sleep fair yet Not endorsing  suicidal or homicidal ideation intent or plans; no mention of auditory or visual hallucinations Alert; oriented; cognition intact; speech clear; no tremor or evidence of movement impairment.  ECT # RUL in am.  -07/22: first session of ECT performed today without complications or side effects (other than mild dizziness after the session). Patient was stable during the afternoon. Will monitor as treatment progresses.  ;;07/24: appetite and mood improved; transient sxs after ECT; discharged focused; ECT #2 in am RUL.  - 07/25 - Patient is showing initial improvement. Today she had the second session of ECT. No major side effect. Anxious about discharge.  - 07/26 - Patient shows additional improvement today, reports better mood and capacity to enjoy life. No major side effect. Does not want to continue ECT tomorrow. Anxious about discharge (wants to resume ECT as outpatient if she worsens after discharge). This is a 66-year-old domiciled female with PPHx of depression, self-reported narcissistic and borderline personality disorders, 1PPH in 2012 for depression and received 8 unilateral ECT treatments per patient, no pSA/NSSIB, BIBS for worsening depressive symptoms since a mitral valve relapse surgery in 3/2022 and patient feeling overwhelmed/more anxious having had no prior medical ailments and then requiring a heart surgery this past March. Patient stable and has routine f/u and TTE in the fall of this year, no other PMH. No substance use history.     On initial assessment, patient is anxious and requesting to be discharged with improvement in her symptoms reported after changing Abilify to AM dosing (instead of PM) and this helping her sleep. Patient shares how distressing recent MVP repair was and how she has had difficulty dealing with this despite knowing she is well otherwise at this time and does not require significant medical management for this condition any longer. Suspect characterological components contributory to presentation and possible depressive symptoms (adjustment d/o with depressive and anxiety features versus MDD, r/o BPAD). Patient not acutely suicidal and low imminent risk and had recent medication changes in outpatient setting and has active outpatient care. Reassess tomorrow to see if ongoing mood stability and expand collateral with outpatient providers.     -07/18: patient feels better, but still endorses depressive symptoms. Denies SI/HI and AH/VH. She said she had a good response to ECT in the past and wants do pursue this treatment option. Will start referral process.  -07/19: patient still has depressive symptoms, expected to start ECT. She was medically cleared, but needs additional labs (ordered today). MOCA was performed and patient scored 23. She signed consent today.    -07/20: patient medically cleared for ECT today. Plan is to start ECT on Friday. Not endorsing  suicidal or homicidal ideation intent or plans at this moment. Still depressed and unable to properly focus.   ;;07/21: complains of anxiety and not "being able to do anything" ; sleep fair yet Not endorsing  suicidal or homicidal ideation intent or plans; no mention of auditory or visual hallucinations Alert; oriented; cognition intact; speech clear; no tremor or evidence of movement impairment.  ECT # RUL in am.  -07/22: first session of ECT performed today without complications or side effects (other than mild dizziness after the session). Patient was stable during the afternoon. Will monitor as treatment progresses.  ;;07/24: appetite and mood improved; transient sxs after ECT; discharged focused; ECT #2 in am RUL.  - 07/25 - Patient is showing initial improvement. Today she had the second session of ECT. No major side effect. Anxious about discharge.  - 07/26 - Patient shows additional improvement today, reports better mood and capacity to enjoy life. No major side effect. Does not want to continue ECT tomorrow. Anxious about discharge (wants to resume ECT as outpatient if she worsens after discharge).  - 07/27 - patient shows brighter affect and does not report anhedonia anymore. Refused additional sessions of inpatient ECT. Wants to continue treatment as outpatient. Denies SI. Should be discharged tomorrow.

## 2022-07-27 NOTE — BH INPATIENT PSYCHIATRY PROGRESS NOTE - CURRENT MEDICATION
MEDICATIONS  (STANDING):  ARIPiprazole 10 milliGRAM(s) Oral daily  traZODone 50 milliGRAM(s) Oral at bedtime  venlafaxine  milliGRAM(s) Oral daily    MEDICATIONS  (PRN):  acetaminophen     Tablet .. 650 milliGRAM(s) Oral every 6 hours PRN Moderate Pain (4 - 6)  ibuprofen  Tablet. 200 milliGRAM(s) Oral every 6 hours PRN back pain  LORazepam     Tablet 0.5 milliGRAM(s) Oral three times a day PRN Anxiety  polyethylene glycol 3350 17 Gram(s) Oral at bedtime PRN Constipation   MEDICATIONS  (STANDING):  ARIPiprazole 10 milliGRAM(s) Oral daily  traZODone 50 milliGRAM(s) Oral at bedtime  venlafaxine  milliGRAM(s) Oral daily    MEDICATIONS  (PRN):  acetaminophen     Tablet .. 650 milliGRAM(s) Oral every 6 hours PRN Moderate Pain (4 - 6)  ibuprofen  Tablet. 200 milliGRAM(s) Oral every 6 hours PRN back pain  LORazepam     Tablet 0.5 milliGRAM(s) Oral two times a day PRN Anxiety  polyethylene glycol 3350 17 Gram(s) Oral at bedtime PRN Constipation

## 2022-07-27 NOTE — BH INPATIENT PSYCHIATRY PROGRESS NOTE - NSBHMSETHTCONTENT_PSY_A_CORE
Ruminations

## 2022-07-27 NOTE — BH PSYCHOLOGY - CLINICIAN PSYCHOTHERAPY NOTE - NSBHPSYCHOLNARRATIVE_PSY_A_CORE FT
DESCRIPTION: Balbina is a 66-year-old domiciled,  female with Hx of depression and self-reported personality disorders, who self-presented with worsening depressive symptoms following heart surgery in March though without other clear precipitating. Balbina has received two ECT Tx while on unit with reported response of improved mood; she has declined further ECT Tx while inpatient and has stated possibility of returning to ECT on outpatient basis if needed.   SESSION CONTENT: Balbina reported strong interest in d/c today or as soon as possible and was focused on improved environmental circumstances of being home including being able to cook and go for walks. Balbina asked for this clinician to communicate this to rest of Tx team. Balbina and this clinician discussed her decision not to pursue ECT this morning, as she felt it was unneccesary due to improved mood. Balbina also reiterated that she would seek further ECT Tx on outpatient basis in the case of relapse of depressive Sxs. Balbina and this clinician then discussed early signs for her of onset of depressive episode, including isolating from others.   BEHAVIORAL OBSERVATIONS: Balbina was well-engaged and goal-oriented towards D/C, continued to be less agitated than during previous interactions with this clinician.  RISK FACTORS:                          Static - Hx of depressive episodes, Hx of heart surgery                          Modifiable - improved mood, response to ECT                          Protective - Stable housing, outpatient care, friends and social support  APPEARANCE:  [x] adequately groomed [] disheveled [] malodorous [] Other:   BEHAVIOR: [x] cooperative [] uncooperative [x] good EC [] poor EC [] well related [] oddly related [] guarded []PMA [] PMR []abnormal movements [] Other:   SPEED: [x] normal rate/rhythm/volume [] loud [] quiet [] slow [] rapid [] pressured [] Other: _________   MOOD: [x] euthymic [] dysphoric [] anxious [] irritable [] Other: ___________   AFFECT: [] full [] expansive [] constricted [] blunted [] flat [x] stable [] labile [] Other: ________________ THOUGHT PROCESS: [] organized [] disorganized [x] goal-directed [x] concrete [x] logical [] illogical   [] circumstantial [] tangential [] impoverished [] effusive [] repetitive [] Other:  THOUGHT CONTENT: [x] negative for delusions/suicidal ideation /homicidal ideation [] positive for delusions/suicidal ideation/homicidal ideation Describe:   PERCEPTION: [x] negative for auditory/ visual hallucinations [] positive for auditory/ visual hallucinations Describe: ________________________________________________________   INSIGHT/JUDGMENT: [] good [x]fair [] poor        IMPULSE CONTROL: [x] good []fair [] poor     COGNITION: [x] alert and oriented to person, time, place [] Lacks orientation to person/time/place. Describe: ___________________________

## 2022-07-28 VITALS
OXYGEN SATURATION: 97 % | RESPIRATION RATE: 18 BRPM | SYSTOLIC BLOOD PRESSURE: 129 MMHG | HEART RATE: 106 BPM | TEMPERATURE: 99 F | DIASTOLIC BLOOD PRESSURE: 90 MMHG

## 2022-07-28 PROCEDURE — 99238 HOSP IP/OBS DSCHRG MGMT 30/<: CPT

## 2022-07-28 RX ORDER — VENLAFAXINE HCL 75 MG
2 CAPSULE, EXT RELEASE 24 HR ORAL
Qty: 60 | Refills: 0
Start: 2022-07-28 | End: 2022-08-26

## 2022-07-28 RX ORDER — TRAZODONE HCL 50 MG
1 TABLET ORAL
Qty: 15 | Refills: 0
Start: 2022-07-28 | End: 2022-08-11

## 2022-07-28 RX ORDER — ARIPIPRAZOLE 15 MG/1
1 TABLET ORAL
Qty: 30 | Refills: 0
Start: 2022-07-28 | End: 2022-08-26

## 2022-07-28 RX ADMIN — Medication 0.5 MILLIGRAM(S): at 09:48

## 2022-07-28 RX ADMIN — Medication 300 MILLIGRAM(S): at 09:46

## 2022-07-28 RX ADMIN — ARIPIPRAZOLE 10 MILLIGRAM(S): 15 TABLET ORAL at 09:46

## 2022-07-31 ENCOUNTER — INPATIENT (INPATIENT)
Facility: HOSPITAL | Age: 67
LOS: 16 days | Discharge: HOME | DRG: 885 | End: 2022-08-17
Attending: PSYCHIATRY & NEUROLOGY | Admitting: PSYCHIATRY & NEUROLOGY
Payer: MEDICARE

## 2022-07-31 VITALS
SYSTOLIC BLOOD PRESSURE: 154 MMHG | TEMPERATURE: 98 F | OXYGEN SATURATION: 97 % | DIASTOLIC BLOOD PRESSURE: 99 MMHG | RESPIRATION RATE: 18 BRPM | HEART RATE: 107 BPM | HEIGHT: 62 IN | WEIGHT: 115.96 LBS

## 2022-07-31 DIAGNOSIS — F33.3 MAJOR DEPRESSIVE DISORDER, RECURRENT, SEVERE WITH PSYCHOTIC SYMPTOMS: ICD-10-CM

## 2022-07-31 DIAGNOSIS — F60.81 NARCISSISTIC PERSONALITY DISORDER: ICD-10-CM

## 2022-07-31 DIAGNOSIS — F60.3 BORDERLINE PERSONALITY DISORDER: ICD-10-CM

## 2022-07-31 DIAGNOSIS — F33.2 MAJOR DEPRESSIVE DISORDER, RECURRENT SEVERE WITHOUT PSYCHOTIC FEATURES: ICD-10-CM

## 2022-07-31 DIAGNOSIS — Z56.0 UNEMPLOYMENT, UNSPECIFIED: ICD-10-CM

## 2022-07-31 DIAGNOSIS — F32.9 MAJOR DEPRESSIVE DISORDER, SINGLE EPISODE, UNSPECIFIED: ICD-10-CM

## 2022-07-31 PROBLEM — Z98.890 OTHER SPECIFIED POSTPROCEDURAL STATES: Chronic | Status: ACTIVE | Noted: 2022-07-16

## 2022-07-31 LAB
ALBUMIN SERPL ELPH-MCNC: 4.3 G/DL — SIGNIFICANT CHANGE UP (ref 3.3–5)
ALP SERPL-CCNC: 62 U/L — SIGNIFICANT CHANGE UP (ref 40–120)
ALT FLD-CCNC: 25 U/L — SIGNIFICANT CHANGE UP (ref 10–45)
AMPHET UR-MCNC: NEGATIVE — SIGNIFICANT CHANGE UP
ANION GAP SERPL CALC-SCNC: 12 MMOL/L — SIGNIFICANT CHANGE UP (ref 5–17)
APAP SERPL-MCNC: <5 UG/ML — LOW (ref 10–30)
APPEARANCE UR: CLEAR — SIGNIFICANT CHANGE UP
AST SERPL-CCNC: 25 U/L — SIGNIFICANT CHANGE UP (ref 10–40)
BARBITURATES UR SCN-MCNC: NEGATIVE — SIGNIFICANT CHANGE UP
BASOPHILS # BLD AUTO: 0.03 K/UL — SIGNIFICANT CHANGE UP (ref 0–0.2)
BASOPHILS NFR BLD AUTO: 0.3 % — SIGNIFICANT CHANGE UP (ref 0–2)
BENZODIAZ UR-MCNC: NEGATIVE — SIGNIFICANT CHANGE UP
BILIRUB SERPL-MCNC: 0.2 MG/DL — SIGNIFICANT CHANGE UP (ref 0.2–1.2)
BILIRUB UR-MCNC: NEGATIVE — SIGNIFICANT CHANGE UP
BUN SERPL-MCNC: 10 MG/DL — SIGNIFICANT CHANGE UP (ref 7–23)
CALCIUM SERPL-MCNC: 9.4 MG/DL — SIGNIFICANT CHANGE UP (ref 8.4–10.5)
CHLORIDE SERPL-SCNC: 101 MMOL/L — SIGNIFICANT CHANGE UP (ref 96–108)
CO2 SERPL-SCNC: 25 MMOL/L — SIGNIFICANT CHANGE UP (ref 22–31)
COCAINE METAB.OTHER UR-MCNC: NEGATIVE — SIGNIFICANT CHANGE UP
COLOR SPEC: YELLOW — SIGNIFICANT CHANGE UP
CREAT SERPL-MCNC: 0.8 MG/DL — SIGNIFICANT CHANGE UP (ref 0.5–1.3)
DIFF PNL FLD: NEGATIVE — SIGNIFICANT CHANGE UP
EGFR: 81 ML/MIN/1.73M2 — SIGNIFICANT CHANGE UP
EOSINOPHIL # BLD AUTO: 0.04 K/UL — SIGNIFICANT CHANGE UP (ref 0–0.5)
EOSINOPHIL NFR BLD AUTO: 0.4 % — SIGNIFICANT CHANGE UP (ref 0–6)
ETHANOL SERPL-MCNC: <10 MG/DL — SIGNIFICANT CHANGE UP (ref 0–10)
GLUCOSE SERPL-MCNC: 136 MG/DL — HIGH (ref 70–99)
GLUCOSE UR QL: NEGATIVE — SIGNIFICANT CHANGE UP
HCT VFR BLD CALC: 40.7 % — SIGNIFICANT CHANGE UP (ref 34.5–45)
HGB BLD-MCNC: 13.7 G/DL — SIGNIFICANT CHANGE UP (ref 11.5–15.5)
IMM GRANULOCYTES NFR BLD AUTO: 0.2 % — SIGNIFICANT CHANGE UP (ref 0–1.5)
KETONES UR-MCNC: NEGATIVE — SIGNIFICANT CHANGE UP
LEUKOCYTE ESTERASE UR-ACNC: NEGATIVE — SIGNIFICANT CHANGE UP
LYMPHOCYTES # BLD AUTO: 1.8 K/UL — SIGNIFICANT CHANGE UP (ref 1–3.3)
LYMPHOCYTES # BLD AUTO: 17.4 % — SIGNIFICANT CHANGE UP (ref 13–44)
MCHC RBC-ENTMCNC: 30.4 PG — SIGNIFICANT CHANGE UP (ref 27–34)
MCHC RBC-ENTMCNC: 33.7 GM/DL — SIGNIFICANT CHANGE UP (ref 32–36)
MCV RBC AUTO: 90.4 FL — SIGNIFICANT CHANGE UP (ref 80–100)
METHADONE UR-MCNC: NEGATIVE — SIGNIFICANT CHANGE UP
MONOCYTES # BLD AUTO: 0.68 K/UL — SIGNIFICANT CHANGE UP (ref 0–0.9)
MONOCYTES NFR BLD AUTO: 6.6 % — SIGNIFICANT CHANGE UP (ref 2–14)
NEUTROPHILS # BLD AUTO: 7.79 K/UL — HIGH (ref 1.8–7.4)
NEUTROPHILS NFR BLD AUTO: 75.1 % — SIGNIFICANT CHANGE UP (ref 43–77)
NITRITE UR-MCNC: NEGATIVE — SIGNIFICANT CHANGE UP
NRBC # BLD: 0 /100 WBCS — SIGNIFICANT CHANGE UP (ref 0–0)
OPIATES UR-MCNC: NEGATIVE — SIGNIFICANT CHANGE UP
PCP SPEC-MCNC: SIGNIFICANT CHANGE UP
PCP UR-MCNC: NEGATIVE — SIGNIFICANT CHANGE UP
PH UR: 6.5 — SIGNIFICANT CHANGE UP (ref 5–8)
PLATELET # BLD AUTO: 328 K/UL — SIGNIFICANT CHANGE UP (ref 150–400)
POTASSIUM SERPL-MCNC: 4.4 MMOL/L — SIGNIFICANT CHANGE UP (ref 3.5–5.3)
POTASSIUM SERPL-SCNC: 4.4 MMOL/L — SIGNIFICANT CHANGE UP (ref 3.5–5.3)
PROT SERPL-MCNC: 6.8 G/DL — SIGNIFICANT CHANGE UP (ref 6–8.3)
PROT UR-MCNC: NEGATIVE MG/DL — SIGNIFICANT CHANGE UP
RBC # BLD: 4.5 M/UL — SIGNIFICANT CHANGE UP (ref 3.8–5.2)
RBC # FLD: 13.4 % — SIGNIFICANT CHANGE UP (ref 10.3–14.5)
SALICYLATES SERPL-MCNC: <0.3 MG/DL — LOW (ref 2.8–20)
SARS-COV-2 RNA SPEC QL NAA+PROBE: NEGATIVE — SIGNIFICANT CHANGE UP
SODIUM SERPL-SCNC: 138 MMOL/L — SIGNIFICANT CHANGE UP (ref 135–145)
SP GR SPEC: <=1.005 — SIGNIFICANT CHANGE UP (ref 1–1.03)
THC UR QL: NEGATIVE — SIGNIFICANT CHANGE UP
UROBILINOGEN FLD QL: 0.2 E.U./DL — SIGNIFICANT CHANGE UP
WBC # BLD: 10.36 K/UL — SIGNIFICANT CHANGE UP (ref 3.8–10.5)
WBC # FLD AUTO: 10.36 K/UL — SIGNIFICANT CHANGE UP (ref 3.8–10.5)

## 2022-07-31 PROCEDURE — 99285 EMERGENCY DEPT VISIT HI MDM: CPT

## 2022-07-31 PROCEDURE — 93010 ELECTROCARDIOGRAM REPORT: CPT

## 2022-07-31 RX ORDER — ARIPIPRAZOLE 15 MG/1
10 TABLET ORAL DAILY
Refills: 0 | Status: DISCONTINUED | OUTPATIENT
Start: 2022-08-01 | End: 2022-08-01

## 2022-07-31 RX ORDER — IBUPROFEN 200 MG
400 TABLET ORAL EVERY 6 HOURS
Refills: 0 | Status: DISCONTINUED | OUTPATIENT
Start: 2022-07-31 | End: 2022-07-31

## 2022-07-31 RX ORDER — VENLAFAXINE HCL 75 MG
300 CAPSULE, EXT RELEASE 24 HR ORAL DAILY
Refills: 0 | Status: DISCONTINUED | OUTPATIENT
Start: 2022-08-01 | End: 2022-08-04

## 2022-07-31 RX ORDER — TRAZODONE HCL 50 MG
50 TABLET ORAL AT BEDTIME
Refills: 0 | Status: DISCONTINUED | OUTPATIENT
Start: 2022-07-31 | End: 2022-08-17

## 2022-07-31 RX ORDER — IBUPROFEN 200 MG
400 TABLET ORAL EVERY 6 HOURS
Refills: 0 | Status: DISCONTINUED | OUTPATIENT
Start: 2022-07-31 | End: 2022-08-17

## 2022-07-31 RX ORDER — POLYETHYLENE GLYCOL 3350 17 G/17G
17 POWDER, FOR SOLUTION ORAL AT BEDTIME
Refills: 0 | Status: DISCONTINUED | OUTPATIENT
Start: 2022-07-31 | End: 2022-08-17

## 2022-07-31 RX ADMIN — Medication 0.5 MILLIGRAM(S): at 22:05

## 2022-07-31 RX ADMIN — Medication 50 MILLIGRAM(S): at 22:05

## 2022-07-31 SDOH — ECONOMIC STABILITY - INCOME SECURITY: UNEMPLOYMENT, UNSPECIFIED: Z56.0

## 2022-07-31 NOTE — BH CHART NOTE - NSEVENTNOTEFT_PSY_ALL_CORE
Balbina Friedman  MRN: [id number]  : [birth year]    ACCEPTANCE NOTE    HPI:    Balbina Friedman  MRN: [id number]  : [birth year]    ACCEPTANCE NOTE    HPI:   66 year old female,  from spouse, 66 year old female,  from spouse, unemployed, domiciled, with past medical history of surgically repaired MVP in 2022 and past psychiatric history of narcissistic & borderline personality disorders, MDD, history of 2 inpatient psychiatric hospitalizations & ECT most recently at [facility] (22 - 22) and another ~10 years ago, no history of suicide attempts or self-harm, BIB self after being referred by outpatient psychiatrist for worsening depressive symptoms since discharge from [facility].    On evaluation, the patient is anxious, disheveled, well-related, demonstrating good behavioral control and linear thought processes.  The patient states that she "cannot live like this anymore."  She states that she feels this "constant pressure" and she inquires if she can have an ECT session tomorrow.  The patient is informed that she will discuss her treatment plan with her treatment team tomorrow morning.  The patient then asks if "ECT will fix [her]."  The patient is educated about the efficacy of ECT and she reports that she already felt improvement in her depressive symptoms after two sessions.  The patient reports that she is settling into the unit well this evening.  She denies SI/HI, intent and plan.  All questions and concerns addressed.     Vital Signs: Temperature 37C, , /100, RR 18, SpO2 99%    Physical Exam:  Appearance & Skin: middle-aged woman in NAD, skin warm, dry, no rashes  Head & Neck; ENT: head normocephalic/atraumatic, EOMI, sclera anicteric, no conjunctival injection bilaterally, mucous membranes moist, nares patent  Chest: CTAB, no wheezes, rales, rhonchi, no increased work of breathing  Cardiac: regular rate and rhythm, normal S1, S2, no murmurs, rubs or gallops  Abdomen: soft, non-tender, non-distended  Neurological: AOx3, moving all four extremities against gravity  Extremities: no peripheral cyanosis or edema bilaterally    Mental Status Exam:  Appearance: middle-aged female, appears stated age, disheveled, poor hygiene/grooming  Behavior: anxious, cooperative, well-related, good eye contact, no PMA/PMR noted, no tremor, no abnormal movements, steady gait  Speech: normal volume, rate and anxious tone  Mood: " I can't live like this anymore, this pressure. "  Affect: anxious, mood congruent, stable, and reactive  Thought Process: linear and goal directed without loosening of associations, tangential thought, thought blocking or illogical thought  Thought Content: Denies SI/HI/thoughts of harming self or others, reports ruminations, no paranoia or delusions reported or elicited, patient does not appear internally preoccupied over the course of the interview  Perception: Patient denies auditory and visual hallucinations, illusions  Cognition: Oriented to person, place and time, attn/conc/recent and remote memory/fund of knowledge WNL  Insight: Fair  Judgement: Fair    Assessment & Plan:  66 year old female,  from spouse, 66 year old female,  from spouse, unemployed, domiciled, with past medical history of surgically repaired MVP in 2022 and past psychiatric history of narcissistic & borderline personality disorders, MDD, history of 2 inpatient psychiatric hospitalizations & ECT most recently at [facility] (22 - 22) and another ~10 years ago, no history of suicide attempts or self-harm, BIB self after being referred by outpatient psychiatrist for worsening depressive symptoms since discharge from [address].    On evaluation, the patient is anxious, disheveled, well-related, demonstrating good behavioral control and linear thought processes.  Patient presents for voluntary hospitalization in context of worsening depressive symptoms following discharge from [address] on 22 that are interfering with her ability to function.  She fulfills criteria for major depressive episode.  Patient is at elevated risk of suicide given worsening depressive symptoms and reported passive SI without intent or plan and diagnoses of Borderline Personality Disorder Narcissistic Personality Disorder and MDD as well as poor coping skills.  Protective factors that mitigate this risk include an absence of history of suicide attempts, an ability to advocate for self, strong social support, eagerness to engage in treatment, no signs/symptoms of psychosis or sofy.  Given the patient is an acute risk to self and has demonstrated an inability to care for self since recent discharge, an inpatient psychiatric admission is warranted for safety, medication optimization and psychiatric stabilization.     Plan:     #MDD  -Voluntary Inpatient Psychiatric Admission to [facility]  -Continue the patient's outpatient medications: venlafaxine 300 mg PO daily, trazodone 50 mg PO qhs, lorazepam 0.5 mg po TID prn anxiety, Abilify 10 mg PO daily  -Collect collateral from outpatient psychiatrist Dr. Jamari Saldaña during business hours regarding patient's interval history since discharge.  -Discuss continuation of ECT treatments for depression per patient request.    Saurabh Parish MD  Department of Psychiatry

## 2022-07-31 NOTE — BH PATIENT PROFILE - NSPROGENOTHERPROVIDER_GEN_A_NUR
COLONOSCOPY REPORT    DATE OF PROCEDURE: 1/29/2021    INDICATION: 69 year old female with migrating constant abdominal pain status post unrevealing abdominal CT and upper endoscopy  Preoperative Diagnosis:  Rule out colitis  Postoperative Diagnosis:  Cecal inflammation  PRIMARY PROVIDER:  Kevin Cohen MD  ENDOSCOPIST: Joey Sanchez MD  ANESTHESIA:  Mac anesthesia per anesthesia service  EQUIPMENT: Olympus  pCF scope  Events:   Scope in: Scope In: 1427   At Cecum: At Cecum: 1435  Scope Out: Scope Out: 1452  Scope Withdrawal time: Scope Withdrawal Time: 17  Cecum Surgically Absent:    Sedation was administered with continuous monitoring of the patient by:  -MD  anesthesiologist.  Procedure Description: The patient was placed in the left lateral position and monitored continuously through ECG tracing, pulse oximetry and close clinical observation.    After inspection of the anus and circumferential digital rectal examination were performed, the Olympus pediatric colonoscope was inserted into the rectum and advanced under direct vision to the terminal ileum. Intubation of the cecum was confirmed by regional landmarks: appendiceal orifice and ileocecal valve.   The procedure was considered More difficult than average. Additional maneuvers used to reach the cecum, manual pressure..    During withdrawal examination, the final prep quality was graded per the [place] bowel prep scale.  Right colon:1- (portion of mucosa of the colon segment seen, but other areas of the colon segment are not well seen because of staining, residual stool, and\or opaque liquid)  Transverse colon: 1- (portion of mucosa of the colon segment seen, but other areas of the colon segment are not well seen because of staining, residual stool, and\or opaque liquid)  Left: 1- (portion of mucosa of the colon segment seen, but other areas of the colon segment are not well seen because of staining, residual stool, and\or opaque liquid)  As the  colonoscope was withdrawn, a careful inspection of the colon mucosa was performed including the proximal aspects of the IC valve, the flexures and folds of the colon. A retroflexed view of the rectum was performed.  Findings and interventions are described below.   Overall Avis Rice tolerated the procedure well, without undue discomfort, hypotension or desaturation. The patient was adequately recovered in the endoscopy suite and was transported to PACU. Once adequately recovered, the patient was transferred to her inpatient room.  Findings:  Anorectal:  Normal anal inspection and digital rectal exam  Terminal ileum:  Normal  Colon:  Cecum and proximal ascending colon with diffuse erythema, numerous crypt abscesses, purulence and questionable pseudomembranes.  Multiple biopsies were obtained.  The more distal colon showed no signs of inflammation.  There was moderate to severe diverticulosis of the sigmoid colon  Rectum:  Normal    Interventions:  Biopsies  Complications: none  Specimens:  Cecum    Impression:  1. Mild diffuse inflammation of the cecum and proximal ascending colon with numerous crypt abscesses, purulence, questionable pseudomembranes status post multiple biopsies.  Concern for possible C.  Difficile colitis  2. Severe sigmoid diverticulosis    Recommendations  · Await pathology results.  Will check for C.  Difficile and CMV.  · Recall colonoscopy in 1 year  · Diet:  Low FODMAP  · Check stool for C.  Difficile toxin                     mental health services

## 2022-07-31 NOTE — ED BEHAVIORAL HEALTH ASSESSMENT NOTE - CURRENT MEDICATION
venlafaxine 300 mg PO qday, aripiprazole 10 mg po qhs, trazodone 50 mg po qhs, lorazepam 1 mg po TID PRN anxiety (on average takes daily total of 1.5 mg)

## 2022-07-31 NOTE — ED PROVIDER NOTE - CLINICAL SUMMARY MEDICAL DECISION MAKING FREE TEXT BOX
66F PMH mitral vernon repair, narcissistic/borderline personality disorders, just dc'd a few days ago after inpt admission for depression/ECT, now p/w worsening depression. States that since discharge she has worsening depression, states that her psychiatrist referred her back for re-admission.    Mild tachycardia self improving, other vitals wnl. Exam as above.  ddx: Depression. Clinically no specific toxidrome or withdrawal syndrome.   Medical clearance labs, psych consulted. Will reassess.

## 2022-07-31 NOTE — ED BEHAVIORAL HEALTH ASSESSMENT NOTE - RISK ASSESSMENT
Moderate Acute Suicide Risk Patient is at elevated risk of suicide given worsening depressive symptoms and reported passive SI without intent or plan and diagnoses of Borderline Personality Disorder Narcissistic Personality Disorder and MDD as well as poor coping skills.  Protective factors that mitigate this risk include an absence of history of suicide attempts, an ability to advocate for self, strong social support, eagerness to engage in treatment, no signs/symptoms of psychosis or sofy.  Given the patient is an acute risk to self and has demonstrated an inability to care for self since recent discharge, an inpatient psychiatric admission is warranted for safety, medication optimization and psychiatric stabilization.

## 2022-07-31 NOTE — BH PATIENT PROFILE - FALL HARM RISK - UNIVERSAL INTERVENTIONS
Bed in lowest position, wheels locked, appropriate side rails in place/Call bell, personal items and telephone in reach/Instruct patient to call for assistance before getting out of bed or chair/Non-slip footwear when patient is out of bed/[place] to call system/Physically safe environment - no spills, clutter or unnecessary equipment/Purposeful Proactive Rounding/Room/bathroom lighting operational, light cord in reach

## 2022-07-31 NOTE — ED BEHAVIORAL HEALTH ASSESSMENT NOTE - OTHER PAST PSYCHIATRIC HISTORY (INCLUDE DETAILS REGARDING ONSET, COURSE OF ILLNESS, INPATIENT/OUTPATIENT TREATMENT)
-Current outpatient psychaitrist is Dr. Jamari Saldaña, who she has been seeing for the past 10 years. -Recently connected with outpatient therapist Dr. Marianne Pratt.   -Reports she has been dx with MDD, narcissistic personality disorder, and borderline personality disorder.   -Reports history of two inpatient psychiatric hospitalizations:  First inpatient psychiatric hospitalization was ~10 years ago, at which time she received ECT and most recently at [address] (07/22/22 - 07/28/22) when she received two ECT sessions and discharged with outpatient follow-up and plan to continue ECT outpatient.      Medications on discharge:  ARIPiprazole 10 mg oral tablet: 1 tab(s) orally once a day  traZODone 50 mg oral tablet: 1 tab(s) orally once a day (at bedtime)  venlafaxine 150 mg oral capsule, extended release: 2 cap(s) orally once a day  -Denies history of suicide attempts.   -Denies history of suicidal ideations but states that she would have intrusive thoughts of driving off of the highway around the time of her inpatient psychiatric hospitalization ~10 years ago (didn't act or felt compelled to act).    -Reports h/o psychotropic trials including current meds of venlafaxine/abilify/trazodone/lorazepam (refer to HPI for details regarding those meds). Reports history of tx w/ brexpiprazole which had activating and unpleasant side effects. Reports history of other psychotropic agents around the time of her hospitalziation / ECT ~10 years ago but doesn't remember their names. Reports history of side effects to those agents at that time but doesn't recall details at the time of this writer's psychiatric evaluation.

## 2022-07-31 NOTE — ED BEHAVIORAL HEALTH ASSESSMENT NOTE - SUMMARY
66 year old female,  from spouse, unemployed, domiciled, with past medical history of surgically repaired MVP in March 2022 and past psychiatric history of narcissistic & borderline personality disorders, MDD, history of 2 inpatient psychiatric hospitalizations & ECT most recently at [facility] (07/22/22 - 07/28/22) and another ~10 years ago, no history of suicide attempts or self-harm, BIB self after being referred by outpatient psychiatrist for worsening depressive symptoms since discharge from [address].    On evaluation, the patient is anxious, disheveled, well-related, demonstrating good behavioral control and linear thought processes.  Patient presents for voluntary hospitalization in context of worsening depressive symptoms following discharge from [address] on 07/28/22 that are interfering with her ability to function.  She fulfills criteria for major depressive episode.  Patient is at elevated risk of suicide given worsening depressive symptoms and reported passive SI without intent or plan and diagnoses of Borderline Personality Disorder Narcissistic Personality Disorder and MDD as well as poor coping skills.  Protective factors that mitigate this risk include an absence of history of suicide attempts, an ability to advocate for self, strong social support, eagerness to engage in treatment, no signs/symptoms of psychosis or sofy.  Given the patient is an acute risk to self and has demonstrated an inability to care for self since recent discharge, an inpatient psychiatric admission is warranted for safety, medication optimization and psychiatric stabilization.     #MDD  -Voluntary Inpatient Psychiatric Admission to [facility]  -Continue the patient's outpatient medications: venlafaxine 300 mg PO daily, trazodone 50 mg PO qhs, lorazepam 0.5 mg po TID prn anxiety, Abilify 10 mg PO daily  -Collect collateral from outpatient psychiatrist Dr. Jamari Saldaña during business hours regarding patient's interval history since discharge.

## 2022-07-31 NOTE — ED ADULT NURSE REASSESSMENT NOTE - NS ED NURSE REASSESS COMMENT FT1
Upon arrival, patient placed on constant observation for (suicidal  ideation and depression,  psychiatric consult pending. Patient undressed, wanded, valuables secured. Patient maintained in a safe environment.

## 2022-07-31 NOTE — ED PROVIDER NOTE - CPE EDP CARDIAC NORM
24 F with hx of TTP (followed by Dr. Berman at [facility]) s/p 22 sessions of PLEX at [facility] in 2014 followed by 4 doses of weekly Rituxan presenting with platelet count of 21K concerning for relapse of TTP.     #Relapsed TTP:  - Patient was initially diagnosed at age 18 and was treated at [facility] with 22 sessions of PLEX followed by Rituximab  - Plt 21 on admission  - Peripheral smear with 3-5 schistocytes per HPF  - [id number] < 2, antibody level 83  - s/p 5 days of PLEX, patient with improvement of platelets into normal range. NO PLEX since 10/4. f/u blood bank recommendations today.  - s/p 3 doses solumedrol. 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   - s/p Rituxan on 9/30. Dose #1 of 4 weekly doses. Next dose due 10/7. Will keep her in house until completed that dose and if everything stable at that point will plan for d/c.   - check CBC w DIFF, retic count, CMP, LDH, haptoglobin daily. Hb stable   - CTH negative for bleed  - Will see daily.     Brittnee Montero DO  Hematology/Oncology Fellow, PGY6  Pager: [phone] 24 F with hx of TTP (followed by Dr. Berman at [facility]) s/p 22 sessions of PLEX at [facility] in 2014 followed by 4 doses of weekly Rituxan presenting with platelet count of 21K concerning for relapse of TTP.     #Relapsed TTP:  - Patient was initially diagnosed at age 18 and was treated at [facility] with 22 sessions of PLEX followed by Rituximab  - Plt 21 on admission  - Peripheral smear with 3-5 schistocytes per HPF  - [id number] < 2, antibody level 83  - s/p 5 days of PLEX, patient with improvement of platelets into normal range. NO PLEX since 10/4. f/u blood bank recommendations today.  - s/p 3 doses solumedrol. 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   - s/p Rituxan on 9/30. Dose #1 of 4 weekly doses. Next dose due 10/7. Will keep her in house until completed that dose and if everything stable at that point will plan for d/c.   - check CBC w DIFF, retic count, CMP, LDH, haptoglobin daily. Hb stable   - CTH negative for bleed  - Will see daily.   - NExt dose of rituxan planned for 10/7. Pt wishes to f/u at [facility]. Will start appt process so that she has appt next week   normal...

## 2022-07-31 NOTE — BH PATIENT PROFILE - HOME MEDICATIONS
ARIPiprazole 10 mg oral tablet , 1 tab(s) orally once a day  venlafaxine 150 mg oral capsule, extended release , 2 cap(s) orally once a day  traZODone 50 mg oral tablet , 1 tab(s) orally once a day (at bedtime)

## 2022-07-31 NOTE — ED ADULT TRIAGE NOTE - CHIEF COMPLAINT QUOTE
Pt c/o depression and SI. Pt denies HI, hallucinations, alcohol, drug use .Pt changed and wanded in front, 1:1 initiated, belongings secured.

## 2022-07-31 NOTE — ED BEHAVIORAL HEALTH ASSESSMENT NOTE - PATIENT'S CHIEF COMPLAINT
"I've been feeling worse since discharge and I think I left too soon.  I think I need more sessions of ECT."

## 2022-07-31 NOTE — ED BEHAVIORAL HEALTH ASSESSMENT NOTE - NSSUICPROTFACT_PSY_ALL_CORE
Has been engaged in psychiatric outpatient care for years/Responsibility to children, family, or others/Identifies reasons for living/Supportive social network of family or friends/Positive therapeutic relationships/Spiritism beliefs
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
## 2022-07-31 NOTE — ED PROVIDER NOTE - OBJECTIVE STATEMENT
66F PMH mitral vernon repair, narcissistic/borderline personality disorders, just dc'd a few days ago after inpt admission for depression/ECT, now p/w worsening depression. States that since discharge she has worsening depression, states that her psychiatrist referred her back for re-admission.    Denies HA, SOB, CP, rhinorrhea, cough, sore throat, vision changes, focal weakness/numbness, abd pain, urinary complaints, f/c. Denies toxic ingestions.

## 2022-07-31 NOTE — ED BEHAVIORAL HEALTH ASSESSMENT NOTE - HPI (INCLUDE ILLNESS QUALITY, SEVERITY, DURATION, TIMING, CONTEXT, MODIFYING FACTORS, ASSOCIATED SIGNS AND SYMPTOMS)
66 year old female,  from spouse, unemployed, domiciled, with past medical history of surgically repaired MVP in March 2022 and past psychiatric history of narcissistic & borderline personality disorders, MDD, history of 2 inpatient psychiatric hospitalizations & ECT most recently at [facility] (07/22/22 - 07/28/22) and another ~10 years ago, no history of suicide attempts or self-harm, BIB self after being referred by outpatient psychiatrist for worsening depressive symptoms since discharge from [facility].    On evaluation, the patient is anxious, disheveled, well-related, demonstrating good behavioral control and linear thought processes.  The patient states that her depressive symptoms have significantly worsened since discharge from [facility] on 07/28/22.  She states that her mood significantly improved after her initial two sessions of ECT and she felt pressured to be discharged home with a plan for outpatient ECT.  The patient states that she experienced worsening depressive and anxious symptoms accompanied by loss of appetite and a five pound weight loss as well as anhedonia, low energy, poor attention/concentration, insomnia ameliorated by trazodone, poor motivation and passive SI without intent or plan.  The patient states that her outpatient psychiatrist referred her to the [facility] ED for psychiatric assessment and is advocating for readmission.  The patient is requesting a voluntary readmission to [facility] for safety and psychiatric stabilization.  All questions and concerns addressed.

## 2022-07-31 NOTE — ED BEHAVIORAL HEALTH ASSESSMENT NOTE - DESCRIPTION
Per ED Provider Note written by Dr. Abel Knight on 07/31/22:    "66F PMH mitral vernon repair, narcissistic/borderline personality disorders, just dc'd a few days ago after inpt admission for depression/ECT, now p/w worsening depression. States that since discharge she has worsening depression, states that her psychiatrist referred her back for re-admission.    Denies HA, SOB, CP, rhinorrhea, cough, sore throat, vision changes, focal weakness/numbness, abd pain, urinary complaints, f/c. Denies toxic ingestions.  · Negative Findings: no agitation  · Timing: gradual onset  · Severity: MILD  · Context: unknown  · Aggravated Factors: none  · Relieving Factors: none" MERLYN; reports she underwent cardiac surgery in March 2022 due to worsening of baseline mitral valve prolapse; takes no medications besides psychiatric meds currently Was formerly employed as [employer] but has not published recently. Lives alone in apartment.  from ex- but not formerly . Denies substance use.

## 2022-07-31 NOTE — ED PROVIDER NOTE - PHYSICAL EXAMINATION
no LE edema, normal equal distal pulses, steady unassisted gait.   No clonus, rigidity, tremors, fasciculations. PERRL, EOMI, no nystagmus. Strength 5/5. Steady unassisted gait. Normal bowel sounds, skin temp/color.

## 2022-08-01 LAB — SARS-COV-2 RNA SPEC QL NAA+PROBE: SIGNIFICANT CHANGE UP

## 2022-08-01 PROCEDURE — 99232 SBSQ HOSP IP/OBS MODERATE 35: CPT

## 2022-08-01 RX ADMIN — Medication 0.5 MILLIGRAM(S): at 09:59

## 2022-08-01 RX ADMIN — ARIPIPRAZOLE 10 MILLIGRAM(S): 15 TABLET ORAL at 09:57

## 2022-08-01 RX ADMIN — Medication 0.5 MILLIGRAM(S): at 18:44

## 2022-08-01 RX ADMIN — Medication 300 MILLIGRAM(S): at 09:58

## 2022-08-01 RX ADMIN — Medication 50 MILLIGRAM(S): at 21:53

## 2022-08-01 NOTE — BH INPATIENT PSYCHIATRY PROGRESS NOTE - NSBHMETABOLIC_PSY_ALL_CORE_FT
BMI: BMI (kg/m2): 21.8 (07-31-22 @ 17:40)  HbA1c: A1C with Estimated Average Glucose Result: 5.5 % (07-19-22 @ 07:40)    Glucose:   BP: 134/90 (08-01-22 @ 17:58) (134/90 - 162/100)  Lipid Panel: Date/Time: 07-19-22 @ 07:40  Cholesterol, Serum: 202  Direct LDL: --  HDL Cholesterol, Serum: 85  Total Cholesterol/HDL Ration Measurement: --  Triglycerides, Serum: 106

## 2022-08-01 NOTE — BH SOCIAL WORK INITIAL PSYCHOSOCIAL EVALUATION - NSBHHOUSECOMMENTFT_PSY_ALL_CORE
Pt reports that she resides alone in her apartment. Per chart, her address is: [address] [place] [address], [place] [address].

## 2022-08-01 NOTE — BH TREATMENT PLAN - NSTXECTINTERMD_PSY_ALL_CORE
2 sessions so far. Seems to present an initial improvement. 2 sessions so far. Seems to present an initial improvement. Psychopharm management x 15 min daily. Individual therapy.

## 2022-08-01 NOTE — BH INPATIENT PSYCHIATRY PROGRESS NOTE - PRN MEDS
MEDICATIONS  (PRN):  ibuprofen  Tablet. 400 milliGRAM(s) Oral every 6 hours PRN Moderate Pain (4 - 6)  LORazepam     Tablet 0.5 milliGRAM(s) Oral every 8 hours PRN anxiety  polyethylene glycol 3350 17 Gram(s) Oral at bedtime PRN constipation  traZODone 50 milliGRAM(s) Oral at bedtime PRN insomnia

## 2022-08-01 NOTE — BH INPATIENT PSYCHIATRY PROGRESS NOTE - NSBHFUPINTERVALHXFT_PSY_A_CORE
Fund of knowledge intact; registers and recalls 3/3;  oriented x 3; recalls past events; alert; oriented x3; Alert; oriented; cognition intact; speech clear; no tremor or evidence of movement impairment. Thinking is congruent with affect; no peculiarities' of thinking or language use. Fair to good eye contact; speech clear spontaneous and normal volume ; very focused on getting ECT.  Not endorsing  suicidal or homicidal ideation intent or plans; no mention of auditory or visual hallucinations

## 2022-08-01 NOTE — BH INPATIENT PSYCHIATRY PROGRESS NOTE - NSBHASSESSSUMMFT_PSY_ALL_CORE
This is a 66-year-old domiciled female with PPHx of depression, self-reported narcissistic and borderline personality disorders, 1PPH in 2012 for depression and received 8 unilateral ECT treatments per patient, no pSA/NSSIB, BIBS for worsening depressive symptoms since a mitral valve relapse surgery in 3/2022 and patient feeling overwhelmed/more anxious having had no prior medical ailments and then requiring a heart surgery this past March. Patient stable and has routine f/u and TTE in the fall of this year, no other PMH. No substance use history.     Patient recently admitted and receive 2 ECT treatments with good effect but decided to leave as she felt better but two days after discharge on 7/30 she awoke feeling depressed again and decided to returnjj to the hosptial for more ECT.      ;;08/01: reviewed past recommendations and suggested that Abilify should be discontinued and quesiton efficiacy of ECT.  Will review with TEAM and ECT value of continuing ECT vs med management.

## 2022-08-01 NOTE — BH INPATIENT PSYCHIATRY PROGRESS NOTE - CURRENT MEDICATION
MEDICATIONS  (STANDING):  venlafaxine  milliGRAM(s) Oral daily    MEDICATIONS  (PRN):  ibuprofen  Tablet. 400 milliGRAM(s) Oral every 6 hours PRN Moderate Pain (4 - 6)  LORazepam     Tablet 0.5 milliGRAM(s) Oral every 8 hours PRN anxiety  polyethylene glycol 3350 17 Gram(s) Oral at bedtime PRN constipation  traZODone 50 milliGRAM(s) Oral at bedtime PRN insomnia

## 2022-08-01 NOTE — BH SOCIAL WORK INITIAL PSYCHOSOCIAL EVALUATION - OTHER PAST PSYCHIATRIC HISTORY (INCLUDE DETAILS REGARDING ONSET, COURSE OF ILLNESS, INPATIENT/OUTPATIENT TREATMENT)
Per chart, past psychiatric history of narcissistic & borderline personality disorders, MDD, history of 2 inpatient psychiatric hospitalizations & ECT most recently at [facility] (07/22/22 - 07/28/22) and another ~10 years ago, no history of suicide attempts or self-harm, BIB self after being referred by outpatient psychiatrist for worsening depressive symptoms since discharge from [facility].

## 2022-08-01 NOTE — BH INPATIENT PSYCHIATRY PROGRESS NOTE - NSBHFUPINTERVALCCFT_PSY_A_CORE
[facility],  [address] [place]  Phone: [phone]  Fax [phone]    ATHLETIC TRAINING [address]  Date:  5/10/2019    Patient Name:  Claudia Lion    :  1962  MRN: [id number]  Restrictions/Precautions:    Medical/Treatment Diagnosis Information:  ·  R shld strain, impingement  ·  R shld pain, stiffness  Physician Information:   Dr. 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 Difficulty w/scap depression, heavy cueing. IH workout made UT activation        Plyoback      Chop                          Chest                          ER Flip      OVL R/L Pullbacks 10x OVL R/L Pullbacks 15x   Long/Short lever  Flex. Scap.                                  ABd.           punch 4\" OTB 10x3\" heavy cues 4\" OTB 15x3\" heavy cues        Body/Cardio Blade Flex/Ext                                     IR/ER                                     ABd/ADd          Push-up      Plus                            Wall   30x  Cue for scap depression and retraction                       Table                         Floor          Ball on Wall                 Tramp          Theraband    Rows/Ext                           IR                           ER                           No money                           Horiz.  ABd  YTB w/ball Terminal Neck Extension 20x                         Biceps                           Triceps                           Punches          Cable Column   Rows 25# (B) 2x10 IH 25# 3x10                              Ext. 20# (B) 2x10 IH Recurrence of  Depressive symptoms including poor appetite and lack of motivation (anhedonia).

## 2022-08-01 NOTE — BH INPATIENT PSYCHIATRY PROGRESS NOTE - NSBHCHARTREVIEWVS_PSY_A_CORE FT
Vital Signs Last 24 Hrs  T(C): 37.2 (08-01-22 @ 17:15), Max: 37.2 (08-01-22 @ 17:15)  T(F): 98.9 (08-01-22 @ 17:15), Max: 98.9 (08-01-22 @ 17:15)  HR: 105 (08-01-22 @ 17:58) (105 - 112)  BP: 134/90 (08-01-22 @ 17:58) (134/90 - 149/102)  BP(mean): --  RR: 18 (08-01-22 @ 17:15) (18 - 18)  SpO2: 98% (08-01-22 @ 17:15) (96% - 98%)

## 2022-08-02 PROCEDURE — 99232 SBSQ HOSP IP/OBS MODERATE 35: CPT

## 2022-08-02 RX ADMIN — Medication 400 MILLIGRAM(S): at 21:03

## 2022-08-02 RX ADMIN — Medication 300 MILLIGRAM(S): at 09:03

## 2022-08-02 RX ADMIN — Medication 400 MILLIGRAM(S): at 06:47

## 2022-08-02 RX ADMIN — Medication 50 MILLIGRAM(S): at 21:02

## 2022-08-02 RX ADMIN — Medication 0.5 MILLIGRAM(S): at 17:24

## 2022-08-02 NOTE — BH INPATIENT PSYCHIATRY PROGRESS NOTE - NSBHCHARTREVIEWVS_PSY_A_CORE FT
Vital Signs Last 24 Hrs  T(C): 36.4 (08-02-22 @ 17:13), Max: 36.6 (08-02-22 @ 09:00)  T(F): 97.5 (08-02-22 @ 17:13), Max: 97.8 (08-02-22 @ 09:00)  HR: 104 (08-02-22 @ 17:13) (103 - 104)  BP: 150/108 (08-02-22 @ 17:14) (150/108 - 172/108)  BP(mean): --  RR: 18 (08-02-22 @ 17:13) (18 - 18)  SpO2: 100% (08-02-22 @ 17:13) (100% - 100%)

## 2022-08-02 NOTE — BH INPATIENT PSYCHIATRY PROGRESS NOTE - NSBHFUPINTERVALHXFT_PSY_A_CORE
Sleep  appetite ok; no pain issues. but later states she forced herself to eat; discussed alternatives to ECT adding Seroquel to augment Effexor and consider alternative antidepressant ; however still focused on ECT. Not endorsing  suicidal or homicidal ideation intent or plans; no mention of auditory or visual hallucinations  Alert; oriented; cognition intact; speech clear; no tremor or evidence of movement impairment.  Consultation with Yamileth Weber and Keyshawn generally favorable for continuing ECT.  To be reviewed with team and patient.

## 2022-08-02 NOTE — BH INPATIENT PSYCHIATRY PROGRESS NOTE - NSBHMETABOLIC_PSY_ALL_CORE_FT
BMI: BMI (kg/m2): 21.8 (07-31-22 @ 17:40)  HbA1c: A1C with Estimated Average Glucose Result: 5.5 % (07-19-22 @ 07:40)    Glucose:   BP: 150/108 (08-02-22 @ 17:14) (134/90 - 172/108)  Lipid Panel: Date/Time: 07-19-22 @ 07:40  Cholesterol, Serum: 202  Direct LDL: --  HDL Cholesterol, Serum: 85  Total Cholesterol/HDL Ration Measurement: --  Triglycerides, Serum: 106

## 2022-08-03 LAB — SARS-COV-2 RNA SPEC QL NAA+PROBE: SIGNIFICANT CHANGE UP

## 2022-08-03 PROCEDURE — 99233 SBSQ HOSP IP/OBS HIGH 50: CPT

## 2022-08-03 PROCEDURE — 99232 SBSQ HOSP IP/OBS MODERATE 35: CPT | Mod: GC

## 2022-08-03 RX ORDER — QUETIAPINE FUMARATE 200 MG/1
50 TABLET, FILM COATED ORAL AT BEDTIME
Refills: 0 | Status: DISCONTINUED | OUTPATIENT
Start: 2022-08-03 | End: 2022-08-09

## 2022-08-03 RX ADMIN — Medication 0.5 MILLIGRAM(S): at 12:12

## 2022-08-03 RX ADMIN — Medication 300 MILLIGRAM(S): at 09:53

## 2022-08-03 RX ADMIN — Medication 50 MILLIGRAM(S): at 21:00

## 2022-08-03 NOTE — BH INPATIENT PSYCHIATRY PROGRESS NOTE - NSBHASSESSSUMMFT_PSY_ALL_CORE
This is a 66-year-old domiciled female with PPHx of depression, self-reported narcissistic and borderline personality disorders, 1PPH in 2012 for depression and received 8 unilateral ECT treatments per patient, no pSA/NSSIB, BIBS for worsening depressive symptoms since a mitral valve relapse surgery in 3/2022 and patient feeling overwhelmed/more anxious having had no prior medical ailments and then requiring a heart surgery this past March. Patient stable and has routine f/u and TTE in the fall of this year, no other PMH. No substance use history.     Patient recently admitted and receive 2 ECT treatments with good effect but decided to leave as she felt better but two days after discharge on 7/30 she awoke feeling depressed again and decided to return to the hospital for more ECT.      8/1: reviewed past recommendations and suggested that Abilify should be discontinued and question efficacy of ECT.  Will review with TEAM and ECT value of continuing ECT vs med management.   8/3: well groomed, anxious, logical, ativan-focused, elevated BP to 170s, compliant to starting Seroquel, medicine team consulted for ECT clearance and high BP, Abilify 10mg discontinued and Seroquel started This is a 66-year-old domiciled female with PPHx of depression, self-reported narcissistic and borderline personality disorders, 1PPH in 2012 for depression and received 8 unilateral ECT treatments per patient, no pSA/NSSIB, BIBS for worsening depressive symptoms since a mitral valve relapse surgery in 3/2022 and patient feeling overwhelmed/more anxious having had no prior medical ailments and then requiring a heart surgery this past March. Patient stable and has routine f/u and TTE in the fall of this year, no other PMH. No substance use history.     Patient recently admitted and receive 2 ECT treatments with good effect but decided to leave as she felt better but two days after discharge on 7/30 she awoke feeling depressed again and decided to return to the hospital for more ECT.      8/1: reviewed past recommendations and suggested that Abilify should be discontinued and question efficacy of ECT.  Will review with TEAM and ECT value of continuing ECT vs med management.   8/3: well groomed, anxious, logical, ativan-focused, elevated BP to 170s, compliant to starting Seroquel, medicine team consulted for ECT clearance and high BP, Seroquel started 50mg qhs

## 2022-08-03 NOTE — BH INPATIENT PSYCHIATRY PROGRESS NOTE - NSICDXBHPRIMARYDX_PSY_ALL_CORE
Depression, recurrent   F33.9   Severe episode of recurrent major depressive disorder, with psychotic features   F33.3

## 2022-08-03 NOTE — BH INPATIENT PSYCHIATRY PROGRESS NOTE - NSBHMETABOLIC_PSY_ALL_CORE_FT
BMI: BMI (kg/m2): 21.8 (07-31-22 @ 17:40)  HbA1c: A1C with Estimated Average Glucose Result: 5.5 % (07-19-22 @ 07:40)    Glucose:   BP: 144/105 (08-03-22 @ 14:15) (134/90 - 172/108)  Lipid Panel: Date/Time: 07-19-22 @ 07:40  Cholesterol, Serum: 202  Direct LDL: --  HDL Cholesterol, Serum: 85  Total Cholesterol/HDL Ration Measurement: --  Triglycerides, Serum: 106

## 2022-08-03 NOTE — BH INPATIENT PSYCHIATRY PROGRESS NOTE - NSBHCHARTREVIEWVS_PSY_A_CORE FT
Vital Signs Last 24 Hrs  T(C): 37.4 (08-03-22 @ 14:15), Max: 37.4 (08-03-22 @ 14:15)  T(F): 99.4 (08-03-22 @ 14:15), Max: 99.4 (08-03-22 @ 14:15)  HR: 118 (08-03-22 @ 14:15) (98 - 118)  BP: 144/105 (08-03-22 @ 14:15) (144/105 - 172/108)  BP(mean): --  RR: 20 (08-03-22 @ 14:15) (18 - 20)  SpO2: 100% (08-03-22 @ 14:15) (98% - 100%)

## 2022-08-03 NOTE — BH INPATIENT PSYCHIATRY PROGRESS NOTE - NSBHFUPINTERVALHXFT_PSY_A_CORE
Patient is seen in her room this morning, visible on unit, well groomed and anxious upon interview. States she is feeling very depressed and regrets going home after last discharge. Ruminates over her Lorazepam saying she normally takes it twice a day and that she is not getting it often enough here. Asking if provider will walk to nursing station with her to make sure she gets "my tranquilizer". She states ECT really helped last time and is willing to continue ECT outpatient afterwards this time. When asked about her elevated blood pressures she states it is because she is not getting the Lorazepam and because she is anxious here. Reports sleeping from 9pm-5am with Trazadone and states her appetite "sucks" but forces herself to eat at each meal. Inquiring about Seroquel but agreed to start it. Denies episodes of tearfulness and admits to "a little bit of suicidal thoughts" with no intent or plan. Admits to mild low back pain and denies any other phsycial complaints. Denies hi/ah/vh. Medicine consulted for ECT clearance and elevated blood pressures. Will start Seroquel PO. Patient is seen in her room this morning, visible on unit, well groomed and anxious upon interview. States she is feeling very depressed and regrets going home after last discharge. Ruminates over her Lorazepam saying she normally takes it twice a day and that she is not getting it often enough here. Asking if provider will walk to nursing station with her to make sure she gets "my tranquilizer". She states ECT really helped last time and is willing to continue ECT outpatient afterwards this time. When asked about her elevated blood pressures she states it is because she is not getting the Lorazepam and because she is anxious here. Denies hx of HTN. Reports sleeping from 9pm-5am with Trazadone and states her appetite "sucks" but forces herself to eat at each meal. Inquiring about Seroquel but agreed to start it. Denies episodes of tearfulness and admits to "a little bit of suicidal thoughts" with no intent or plan. Admits to mild low back pain and denies any other physical complaints. Denies hi/ah/vh. Medicine consulted for ECT clearance and elevated blood pressures. Will start Seroquel 50mg qhsPO.

## 2022-08-03 NOTE — BH INPATIENT PSYCHIATRY PROGRESS NOTE - NSICDXBHSECONDARYDX_PSY_ALL_CORE
Severe episode of recurrent major depressive disorder, without psychotic features   F33.2   Borderline personality disorder   F60.3  Narcissistic personality disorder   F60.81  Severe episode of recurrent major depressive disorder, with psychotic features   F33.3

## 2022-08-03 NOTE — ED PROVIDER NOTE - COVID-19 ORDERING FACILITY
[facility] Partially impaired: cannot see medication labels or newsprint, but can see obstacles in path, and the surrounding layout; can count fingers at arm's length

## 2022-08-03 NOTE — CONSULT NOTE ADULT - SUBJECTIVE AND OBJECTIVE BOX
**INCOMPLETE NOTE**    INTERNAL MEDICINE SERVICE INITIAL CONSULT NOTE    HPI: 66F, domiciled, PMH MVP (s/p surgical repair at [facility] in March 2022), past psychiatric history of narcissistic & borderline personality disorders, and MDD presenting for worsening depressive symptoms since March 2022 which have not responded to outpatient treatment and medication management.  Patient recently admitted July 2022 where she underwent 2 ECT txt with good response and overall feeling better.  Two days after discharge on 7/30 she began feeling depressed again and decided to return to the hospital for additional ECT.  Medicine consulted for medical clearance for ECT.    REVIEW OF SYSTEMS:   Otherwise negative except as specified in HPI    PAST MEDICAL HISTORY:     PAST SURGICAL HISTORY:    FAMILY HISTORY:    SOCIAL HISTORY:  Tobacco use:  EtOH use:  Illicit drug use:    MEDICATIONS:  MEDICATIONS  (STANDING):  venlafaxine  milliGRAM(s) Oral daily    MEDICATIONS  (PRN):  ibuprofen  Tablet. 400 milliGRAM(s) Oral every 6 hours PRN Moderate Pain (4 - 6)  LORazepam     Tablet 0.5 milliGRAM(s) Oral every 8 hours PRN anxiety  polyethylene glycol 3350 17 Gram(s) Oral at bedtime PRN constipation  traZODone 50 milliGRAM(s) Oral at bedtime PRN insomnia      ALLERGIES:  Allergies    No Known Allergies    Intolerances        VITAL SIGNS:  Vital Signs Last 24 Hrs  T(C): 37.4 (03 Aug 2022 14:15), Max: 37.4 (03 Aug 2022 14:15)  T(F): 99.4 (03 Aug 2022 14:15), Max: 99.4 (03 Aug 2022 14:15)  HR: 118 (03 Aug 2022 14:15) (98 - 118)  BP: 144/105 (03 Aug 2022 14:15) (144/105 - 172/108)  BP(mean): --  RR: 20 (03 Aug 2022 14:15) (18 - 20)  SpO2: 100% (03 Aug 2022 14:15) (98% - 100%)          PHYSICAL EXAM:  Constitutional: WDWN resting comfortably in bed; NAD  Head: NC/AT  Eyes: PERRL, EOMI, anicteric sclera  ENT: no nasal discharge; uvula midline, no oropharyngeal erythema or exudates; MMM  Neck: supple; no JVD or thyromegaly  Respiratory: CTA B/L; no W/R/R, no retractions  Cardiac: +S1/S2; RRR; no M/R/G; PMI non-displaced  Gastrointestinal: abdomen soft, NT/ND; no rebound or guarding; +BSx4  Genitourinary: normal external genitalia  Back: spine midline, no bony tenderness or step-offs; no CVAT B/L  Extremities: WWP, no clubbing or cyanosis; no peripheral edema  Musculoskeletal: NROM x4; no joint swelling, tenderness or erythema  Vascular: 2+ radial, femoral, DP/PT pulses B/L  Dermatologic: skin warm, dry and intact; no rashes, wounds, or scars  Lymphatic: no submandibular or cervical LAD  Neurologic: AAOx3; CNII-XII grossly intact; no focal deficits  Psychiatric: affect and characteristics of appearance, verbalizations, behaviors are appropriate    LABS:                  CAPILLARY BLOOD GLUCOSE              RADIOLOGY & ADDITIONAL TESTS: Reviewed. INTERNAL MEDICINE SERVICE INITIAL CONSULT NOTE    HPI: 66F domiciled, PMH MVP (s/p surgical repair at [facility] in March 2022), past psychiatric history of narcissistic & borderline personality disorders, and MDD presenting for worsening depressive symptoms since March 2022 which have not responded to outpatient treatment and medication management.  Patient recently admitted July 2022 where she underwent 2 ECT txt with good response and overall feeling better.  Two days after discharge on 7/30 she began feeling depressed again and decided to return to the hospital for additional ECT.  Medicine consulted for medical clearance for ECT.    REVIEW OF SYSTEMS:   Otherwise negative except as specified in HPI    PAST MEDICAL HISTORY: as per HPI    PAST SURGICAL HISTORY: as per HPI    FAMILY HISTORY: deferred    SOCIAL HISTORY:  Tobacco use: denies  EtOH use: denies  Illicit drug use: denies    MEDICATIONS:  MEDICATIONS  (STANDING):  venlafaxine  milliGRAM(s) Oral daily    MEDICATIONS  (PRN):  ibuprofen  Tablet. 400 milliGRAM(s) Oral every 6 hours PRN Moderate Pain (4 - 6)  LORazepam     Tablet 0.5 milliGRAM(s) Oral every 8 hours PRN anxiety  polyethylene glycol 3350 17 Gram(s) Oral at bedtime PRN constipation  traZODone 50 milliGRAM(s) Oral at bedtime PRN insomnia      ALLERGIES:  Allergies    No Known Allergies    Intolerances        VITAL SIGNS:  Vital Signs Last 24 Hrs  T(C): 37.4 (03 Aug 2022 14:15), Max: 37.4 (03 Aug 2022 14:15)  T(F): 99.4 (03 Aug 2022 14:15), Max: 99.4 (03 Aug 2022 14:15)  HR: 118 (03 Aug 2022 14:15) (98 - 118)  BP: 144/105 (03 Aug 2022 14:15) (144/105 - 172/108)  BP(mean): --  RR: 20 (03 Aug 2022 14:15) (18 - 20)  SpO2: 100% (03 Aug 2022 14:15) (98% - 100%)          PHYSICAL EXAM:  Constitutional: WDWN resting comfortably in bed; NAD  Head: NC/AT  Eyes: PERRL, EOMI, anicteric sclera  ENT: no nasal discharge; uvula midline, no oropharyngeal erythema or exudates; MMM  Neck: supple  Respiratory: CTA B/L; no W/R/R  Cardiac: +S1/S2; RRR; no murmurs  Gastrointestinal: abdomen soft, NT/ND; no rebound or guarding; +BSx4  Extremities: WWP. no peripheral edema  Vascular: 2+ radial pulses B/L  Dermatologic: skin warm, dry and intact; no rashes, wounds, or scars  Lymphatic: no submandibular or cervical LAD  Neurologic: AAOx3; CNII-XII grossly intact; no focal deficits  Psychiatric: mildly anxious, affect and characteristics of appearance, verbalizations, behaviors are appropriate    LABS:                  CAPILLARY BLOOD GLUCOSE              RADIOLOGY & ADDITIONAL TESTS: Reviewed.

## 2022-08-03 NOTE — CONSULT NOTE ADULT - ASSESSMENT
66F, domiciled, [facility] MVP (s/p surgical repair at [facility] in March 2022), past psychiatric history of narcissistic & borderline personality disorders, and MDD presenting for worsening MDD symptoms.  Internal medicine consulted for ECT clearance.     #Pre-op clearance  RCRI:   Ayah:  NAYAN:    please obtain the following:   66F domiciled, PM MVP (s/p surgical repair at [facility] in March 2022), past psychiatric history of narcissistic & borderline personality disorders, and MDD presenting for worsening MDD symptoms.  Internal medicine consulted for ECT clearance.     #Pre-op clearance  RCRI: 3.9% 30 day risk of death, MI, or cardiac arrest  Poon: 0.2-0.3% risk of MI or cardiac arrest, intraoperatively or up to 30 days post op (using brain or cardiac surgery)  DASI: 9-10 METS    please obtain the following:  CBC, CMP, Mg, Phos, PT, PTT  EKG    Will assess clearance based on above results.    Final recs pending attending attestation.  Internal medicine will continue to follow.

## 2022-08-03 NOTE — BH INPATIENT PSYCHIATRY PROGRESS NOTE - NSBHATTESTBILLINGAW_PSY_A_CORE
[id number] Inpatient care - moderate complexity - 25 minutes [id number] Inpatient care - high complexity - 35 minutes

## 2022-08-04 LAB
A1C WITH ESTIMATED AVERAGE GLUCOSE RESULT: 5.4 % — SIGNIFICANT CHANGE UP (ref 4–5.6)
ALBUMIN SERPL ELPH-MCNC: 4.7 G/DL — SIGNIFICANT CHANGE UP (ref 3.3–5)
ALP SERPL-CCNC: 68 U/L — SIGNIFICANT CHANGE UP (ref 40–120)
ALT FLD-CCNC: 19 U/L — SIGNIFICANT CHANGE UP (ref 10–45)
ANION GAP SERPL CALC-SCNC: 13 MMOL/L — SIGNIFICANT CHANGE UP (ref 5–17)
AST SERPL-CCNC: 18 U/L — SIGNIFICANT CHANGE UP (ref 10–40)
BASOPHILS # BLD AUTO: 0.04 K/UL — SIGNIFICANT CHANGE UP (ref 0–0.2)
BASOPHILS NFR BLD AUTO: 0.4 % — SIGNIFICANT CHANGE UP (ref 0–2)
BILIRUB SERPL-MCNC: 0.2 MG/DL — SIGNIFICANT CHANGE UP (ref 0.2–1.2)
BUN SERPL-MCNC: 9 MG/DL — SIGNIFICANT CHANGE UP (ref 7–23)
CALCIUM SERPL-MCNC: 9.6 MG/DL — SIGNIFICANT CHANGE UP (ref 8.4–10.5)
CHLORIDE SERPL-SCNC: 95 MMOL/L — LOW (ref 96–108)
CHOLEST SERPL-MCNC: 218 MG/DL — HIGH
CO2 SERPL-SCNC: 25 MMOL/L — SIGNIFICANT CHANGE UP (ref 22–31)
CREAT SERPL-MCNC: 0.77 MG/DL — SIGNIFICANT CHANGE UP (ref 0.5–1.3)
EGFR: 85 ML/MIN/1.73M2 — SIGNIFICANT CHANGE UP
EOSINOPHIL # BLD AUTO: 0.04 K/UL — SIGNIFICANT CHANGE UP (ref 0–0.5)
EOSINOPHIL NFR BLD AUTO: 0.4 % — SIGNIFICANT CHANGE UP (ref 0–6)
ESTIMATED AVERAGE GLUCOSE: 108 MG/DL — SIGNIFICANT CHANGE UP (ref 68–114)
GLUCOSE SERPL-MCNC: 109 MG/DL — HIGH (ref 70–99)
HCT VFR BLD CALC: 42.4 % — SIGNIFICANT CHANGE UP (ref 34.5–45)
HDLC SERPL-MCNC: 86 MG/DL — SIGNIFICANT CHANGE UP
HGB BLD-MCNC: 14.3 G/DL — SIGNIFICANT CHANGE UP (ref 11.5–15.5)
IMM GRANULOCYTES NFR BLD AUTO: 0.2 % — SIGNIFICANT CHANGE UP (ref 0–1.5)
LIPID PNL WITH DIRECT LDL SERPL: 114 MG/DL — HIGH
LYMPHOCYTES # BLD AUTO: 2.08 K/UL — SIGNIFICANT CHANGE UP (ref 1–3.3)
LYMPHOCYTES # BLD AUTO: 20 % — SIGNIFICANT CHANGE UP (ref 13–44)
MAGNESIUM SERPL-MCNC: 2.3 MG/DL — SIGNIFICANT CHANGE UP (ref 1.6–2.6)
MCHC RBC-ENTMCNC: 30.4 PG — SIGNIFICANT CHANGE UP (ref 27–34)
MCHC RBC-ENTMCNC: 33.7 GM/DL — SIGNIFICANT CHANGE UP (ref 32–36)
MCV RBC AUTO: 90.2 FL — SIGNIFICANT CHANGE UP (ref 80–100)
MONOCYTES # BLD AUTO: 1.05 K/UL — HIGH (ref 0–0.9)
MONOCYTES NFR BLD AUTO: 10.1 % — SIGNIFICANT CHANGE UP (ref 2–14)
NEUTROPHILS # BLD AUTO: 7.17 K/UL — SIGNIFICANT CHANGE UP (ref 1.8–7.4)
NEUTROPHILS NFR BLD AUTO: 68.9 % — SIGNIFICANT CHANGE UP (ref 43–77)
NON HDL CHOLESTEROL: 132 MG/DL — HIGH
NRBC # BLD: 0 /100 WBCS — SIGNIFICANT CHANGE UP (ref 0–0)
PHOSPHATE SERPL-MCNC: 3.4 MG/DL — SIGNIFICANT CHANGE UP (ref 2.5–4.5)
PLATELET # BLD AUTO: 331 K/UL — SIGNIFICANT CHANGE UP (ref 150–400)
POTASSIUM SERPL-MCNC: 4 MMOL/L — SIGNIFICANT CHANGE UP (ref 3.5–5.3)
POTASSIUM SERPL-SCNC: 4 MMOL/L — SIGNIFICANT CHANGE UP (ref 3.5–5.3)
PROT SERPL-MCNC: 7.1 G/DL — SIGNIFICANT CHANGE UP (ref 6–8.3)
RBC # BLD: 4.7 M/UL — SIGNIFICANT CHANGE UP (ref 3.8–5.2)
RBC # FLD: 13.4 % — SIGNIFICANT CHANGE UP (ref 10.3–14.5)
SODIUM SERPL-SCNC: 133 MMOL/L — LOW (ref 135–145)
TRIGL SERPL-MCNC: 91 MG/DL — SIGNIFICANT CHANGE UP
WBC # BLD: 10.4 K/UL — SIGNIFICANT CHANGE UP (ref 3.8–10.5)
WBC # FLD AUTO: 10.4 K/UL — SIGNIFICANT CHANGE UP (ref 3.8–10.5)

## 2022-08-04 PROCEDURE — 99232 SBSQ HOSP IP/OBS MODERATE 35: CPT

## 2022-08-04 RX ORDER — VENLAFAXINE HCL 75 MG
187.5 CAPSULE, EXT RELEASE 24 HR ORAL DAILY
Refills: 0 | Status: DISCONTINUED | OUTPATIENT
Start: 2022-08-05 | End: 2022-08-10

## 2022-08-04 RX ADMIN — Medication 0.5 MILLIGRAM(S): at 09:34

## 2022-08-04 RX ADMIN — Medication 300 MILLIGRAM(S): at 09:34

## 2022-08-04 RX ADMIN — Medication 50 MILLIGRAM(S): at 21:50

## 2022-08-04 RX ADMIN — Medication 0.5 MILLIGRAM(S): at 17:55

## 2022-08-04 RX ADMIN — QUETIAPINE FUMARATE 50 MILLIGRAM(S): 200 TABLET, FILM COATED ORAL at 21:51

## 2022-08-04 RX ADMIN — Medication 400 MILLIGRAM(S): at 06:55

## 2022-08-04 NOTE — BH INPATIENT PSYCHIATRY PROGRESS NOTE - CURRENT MEDICATION
MEDICATIONS  (STANDING):  QUEtiapine 50 milliGRAM(s) Oral at bedtime    MEDICATIONS  (PRN):  ibuprofen  Tablet. 400 milliGRAM(s) Oral every 6 hours PRN Moderate Pain (4 - 6)  LORazepam     Tablet 0.5 milliGRAM(s) Oral every 8 hours PRN anxiety  polyethylene glycol 3350 17 Gram(s) Oral at bedtime PRN constipation  traZODone 50 milliGRAM(s) Oral at bedtime PRN insomnia

## 2022-08-04 NOTE — BH INPATIENT PSYCHIATRY PROGRESS NOTE - NSBHCHARTREVIEWVS_PSY_A_CORE FT
Vital Signs Last 24 Hrs  T(C): 37.1 (08-04-22 @ 20:03), Max: 37.2 (08-04-22 @ 06:29)  T(F): 98.8 (08-04-22 @ 20:03), Max: 99 (08-04-22 @ 06:29)  HR: 102 (08-04-22 @ 20:03) (102 - 108)  BP: 163/104 (08-04-22 @ 20:04) (146/104 - 168/102)  BP(mean): --  RR: 18 (08-04-22 @ 20:04) (18 - 18)  SpO2: 98% (08-04-22 @ 20:04) (97% - 98%)

## 2022-08-04 NOTE — BH INPATIENT PSYCHIATRY PROGRESS NOTE - NSICDXBHSECONDARYDX_PSY_ALL_CORE
Borderline personality disorder   F60.3  Narcissistic personality disorder   F60.81  Severe episode of recurrent major depressive disorder, with psychotic features   F33.3

## 2022-08-04 NOTE — BH INPATIENT PSYCHIATRY PROGRESS NOTE - NSBHMETABOLIC_PSY_ALL_CORE_FT
BMI: BMI (kg/m2): 21.8 (07-31-22 @ 17:40)  HbA1c: A1C with Estimated Average Glucose Result: 5.4 % (08-04-22 @ 07:23)    Glucose:   BP: 163/104 (08-04-22 @ 20:04) (144/105 - 172/108)  Lipid Panel: Date/Time: 08-04-22 @ 07:23  Cholesterol, Serum: 218  Direct LDL: --  HDL Cholesterol, Serum: 86  Total Cholesterol/HDL Ration Measurement: --  Triglycerides, Serum: 91

## 2022-08-04 NOTE — BH INPATIENT PSYCHIATRY PROGRESS NOTE - NSBHASSESSSUMMFT_PSY_ALL_CORE
This is a 66-year-old domiciled female with PPHx of depression, self-reported narcissistic and borderline personality disorders, 1PPH in 2012 for depression and received 8 unilateral ECT treatments per patient, no pSA/NSSIB, BIBS for worsening depressive symptoms since a mitral valve relapse surgery in 3/2022 and patient feeling overwhelmed/more anxious having had no prior medical ailments and then requiring a heart surgery this past March. Patient stable and has routine f/u and TTE in the fall of this year, no other PMH. No substance use history.     Patient recently admitted and receive 2 ECT treatments with good effect but decided to leave as she felt better but two days after discharge on 7/30 she awoke feeling depressed again and decided to return to the hospital for more ECT.      8/1: reviewed past recommendations and suggested that Abilify should be discontinued and question efficacy of ECT.  Will review with TEAM and ECT value of continuing ECT vs med management.   8/3: well groomed, anxious, logical, ativan-focused, elevated BP to 170s, compliant to starting Seroquel, medicine team consulted for ECT clearance and high BP, Seroquel started 50mg qhs   ;;08/04: patient will try Seroquel tonight; being cleared for ECT but concerns about high bp but Effexor being lowered to 187.5mg daily.  Abilify has been discontinued.

## 2022-08-04 NOTE — BH INPATIENT PSYCHIATRY PROGRESS NOTE - NSBHFUPINTERVALHXFT_PSY_A_CORE
conitnues anxious about getting ECT  MOCA 21  BDI 52; conitnue efforts at clearance for ECT.  Alert; oriented; cognition intact; speech clear; no tremor or evidence of movement impairment.  discusswed low dose Seroquel ; she did not take dose last night because she fell asleep.

## 2022-08-04 NOTE — BH INPATIENT PSYCHIATRY PROGRESS NOTE - NSBHATTESTAPPBILLTIME_PSY_A_CORE
I attest my time as CARLOS is greater than 50% of the total combined time spent on qualifying patient care activities. I have reviewed and verified the documentation.
I attest my time as CARLOS is greater than 50% of the total combined time spent on qualifying patient care activities. I have reviewed and verified the documentation.

## 2022-08-05 PROCEDURE — 99221 1ST HOSP IP/OBS SF/LOW 40: CPT

## 2022-08-05 RX ADMIN — QUETIAPINE FUMARATE 50 MILLIGRAM(S): 200 TABLET, FILM COATED ORAL at 21:27

## 2022-08-05 RX ADMIN — Medication 187.5 MILLIGRAM(S): at 10:09

## 2022-08-05 RX ADMIN — Medication 400 MILLIGRAM(S): at 13:17

## 2022-08-05 RX ADMIN — Medication 0.5 MILLIGRAM(S): at 21:27

## 2022-08-05 RX ADMIN — Medication 0.5 MILLIGRAM(S): at 12:19

## 2022-08-05 RX ADMIN — Medication 0.5 MILLIGRAM(S): at 15:26

## 2022-08-05 RX ADMIN — Medication 50 MILLIGRAM(S): at 21:27

## 2022-08-05 RX ADMIN — Medication 400 MILLIGRAM(S): at 11:31

## 2022-08-05 NOTE — CONSULT NOTE ADULT - ASSESSMENT
66F domiciled, PMH MVP (s/p surgical repair at [facility] in March 2022), past psychiatric history of narcissistic & borderline personality disorders, and MDD presenting for worsening MDD symptoms.  Cardiology consulted for ECT ro-procedural risk stratification.     #Pre-procedural Risk Stratification  Reports a good functional capacity without cardiac complaints  -EKG NSR with T wave inversions v1-v6 , likely post MV repair. Reports previous cardiac cath prior to surgery with normal coronaries.  - No need for further work up  - Low risk for low risk procedure    Plan discussed with Cardiology attending - Cardiology will sign off, please reconsult with nay further questions.

## 2022-08-05 NOTE — CONSULT NOTE ADULT - ATTENDING COMMENTS
Initial attending contact date  8/5/22    . See fellow note written above for details. I reviewed the fellow documentation. I have personally seen and examined this patient. I reviewed vitals, labs, medications, cardiac studies, and additional imaging. I agree with the above fellow's findings and plans as written above with the following additions/statements.    66 F with hx recent MV repair for MVP @ [facility] 3/22 now planned for ECT  -Cardiology consulted for risk assessment  -pt without cardiac complaints  -EKG NSR with T wave inversions v1-v6 , likely post MV repair. Had cardiac cath prior to surgery - normal cors  -No need for further work up  -Pt considered low risk for low risk procedure

## 2022-08-05 NOTE — CONSULT NOTE ADULT - SUBJECTIVE AND OBJECTIVE BOX
Patient is a 66y old  Female who presents with a chief complaint of MDD (03 Aug 2022 14:26)      HPI:      PAST MEDICAL & SURGICAL HISTORY:  History of mitral valve repair      Depression, major          HPI:                PREVIOUS DIAGNOSTIC TESTING:      ECHO  FINDINGS:    STRESS  FINDINGS:    CATHETERIZATION  FINDINGS:    MEDICATIONS  (STANDING):  QUEtiapine 50 milliGRAM(s) Oral at bedtime  venlafaxine .5 milliGRAM(s) Oral daily    MEDICATIONS  (PRN):  ibuprofen  Tablet. 400 milliGRAM(s) Oral every 6 hours PRN Moderate Pain (4 - 6)  LORazepam     Tablet 0.5 milliGRAM(s) Oral three times a day PRN moderate to severe anxiety  polyethylene glycol 3350 17 Gram(s) Oral at bedtime PRN constipation  traZODone 50 milliGRAM(s) Oral at bedtime PRN insomnia      FAMILY HISTORY:      SOCIAL HISTORY:    CIGARETTES:    ALCOHOL:    REVIEW OF SYSTEMS      General:	    Skin/Breast:  	  Ophthalmologic:  	  ENMT:	    Respiratory and Thorax:  	  Cardiovascular:	    Gastrointestinal:	    Genitourinary:	    Musculoskeletal:	    Neurological:	    Psychiatric:	    Hematology/Lymphatics:	    Endocrine:	    Allergic/Immunologic:	    Vital Signs Last 24 Hrs  T(C): 37.2 (05 Aug 2022 08:35), Max: 37.3 (05 Aug 2022 06:00)  T(F): 98.9 (05 Aug 2022 08:35), Max: 99.2 (05 Aug 2022 06:00)  HR: 109 (05 Aug 2022 08:35) (98 - 109)  BP: 127/92 (05 Aug 2022 08:35) (116/84 - 168/102)  BP(mean): --  RR: 18 (05 Aug 2022 08:35) (14 - 18)  SpO2: 98% (05 Aug 2022 08:35) (97% - 98%)    Parameters below as of 05 Aug 2022 08:35  Patient On (Oxygen Delivery Method): room air        PHYSICAL EXAM:      Constitutional:    Eyes:    ENMT:    Neck:    Breasts:    Back:    Respiratory:    Cardiovascular:    Gastrointestinal:    Genitourinary:    Rectal:    Extremities:    Vascular:    Neurological:    Skin:    Lymph Nodes:    Musculoskeletal:    Psychiatric:            INTERPRETATION OF TELEMETRY:    ECG:    I&O's Detail      LABS:                        14.3   10.40 )-----------( 331      ( 04 Aug 2022 15:14 )             42.4     08-04    133<L>  |  95<L>  |  9   ----------------------------<  109<H>  4.0   |  25  |  0.77    Ca    9.6      04 Aug 2022 15:14  Phos  3.4     08-04  Mg     2.3     08-04    TPro  7.1  /  Alb  4.7  /  TBili  0.2  /  DBili  x   /  AST  18  /  ALT  19  /  AlkPhos  68  08-04            I&O's Summary    BNP  RADIOLOGY & ADDITIONAL STUDIES: 66F PMH MVP (s/p surgical repair at [facility] in March 2022), past psychiatric history of narcissistic & borderline personality disorders, and MDD presenting for worsening depressive symptoms since March 2022 which have not responded to outpatient treatment and medication management.  Patient recently admitted July 2022 where she underwent 2 ECT txt with good response and overall feeling better.  Two days after discharge on 7/30 she began feeling depressed again and decided to return to the hospital for additional ECT.  Cardiology consulted for perioperative risk stratification for ECT.    PREVIOUS DIAGNOSTIC TESTING:      ECHO  FINDINGS: None    STRESS  FINDINGS: None    CATHETERIZATION  FINDINGS: None    MEDICATIONS  (STANDING):  QUEtiapine 50 milliGRAM(s) Oral at bedtime  venlafaxine .5 milliGRAM(s) Oral daily    MEDICATIONS  (PRN):  ibuprofen  Tablet. 400 milliGRAM(s) Oral every 6 hours PRN Moderate Pain (4 - 6)  LORazepam     Tablet 0.5 milliGRAM(s) Oral three times a day PRN moderate to severe anxiety  polyethylene glycol 3350 17 Gram(s) Oral at bedtime PRN constipation  traZODone 50 milliGRAM(s) Oral at bedtime PRN insomnia    Vital Signs Last 24 Hrs  T(C): 37.2 (05 Aug 2022 08:35), Max: 37.3 (05 Aug 2022 06:00)  T(F): 98.9 (05 Aug 2022 08:35), Max: 99.2 (05 Aug 2022 06:00)  HR: 109 (05 Aug 2022 08:35) (98 - 109)  BP: 127/92 (05 Aug 2022 08:35) (116/84 - 168/102)  BP(mean): --  RR: 18 (05 Aug 2022 08:35) (14 - 18)  SpO2: 98% (05 Aug 2022 08:35) (97% - 98%)    Parameters below as of 05 Aug 2022 08:35  Patient On (Oxygen Delivery Method): room air        PHYSICAL EXAM:  GENERAL: NAD, speaks in full sentences, no signs of respiratory distress  HEAD:  Atraumatic, Normocephalic  EYES: EOMI, PERRLA, conjunctiva and sclera clear  NECK: Supple, No JVD  CHEST/LUNG: Clear to auscultation bilaterally; No wheeze; No crackles; No accessory muscles used  HEART: Regular rate and rhythm; No murmurs;   ABDOMEN: Soft, Nontender, Nondistended; Bowel sounds present; No guarding  EXTREMITIES:  2+ Peripheral Pulses, No cyanosis or edema  PSYCH: AAOx3  NEUROLOGY: non-focal  SKIN: No rashes or lesions              INTERPRETATION OF TELEMETRY:    ECG:    I&O's Detail      LABS:                        14.3   10.40 )-----------( 331      ( 04 Aug 2022 15:14 )             42.4     08-04    133<L>  |  95<L>  |  9   ----------------------------<  109<H>  4.0   |  25  |  0.77    Ca    9.6      04 Aug 2022 15:14  Phos  3.4     08-04  Mg     2.3     08-04    TPro  7.1  /  Alb  4.7  /  TBili  0.2  /  DBili  x   /  AST  18  /  ALT  19  /  AlkPhos  68  08-04            I&O's Summary    BNP  RADIOLOGY & ADDITIONAL STUDIES:

## 2022-08-06 RX ADMIN — Medication 0.5 MILLIGRAM(S): at 09:42

## 2022-08-06 RX ADMIN — Medication 0.5 MILLIGRAM(S): at 16:31

## 2022-08-06 RX ADMIN — Medication 400 MILLIGRAM(S): at 16:32

## 2022-08-06 RX ADMIN — QUETIAPINE FUMARATE 50 MILLIGRAM(S): 200 TABLET, FILM COATED ORAL at 20:55

## 2022-08-06 RX ADMIN — Medication 187.5 MILLIGRAM(S): at 09:40

## 2022-08-06 RX ADMIN — Medication 50 MILLIGRAM(S): at 20:56

## 2022-08-06 RX ADMIN — Medication 400 MILLIGRAM(S): at 11:03

## 2022-08-06 RX ADMIN — Medication 400 MILLIGRAM(S): at 10:03

## 2022-08-06 RX ADMIN — Medication 400 MILLIGRAM(S): at 17:30

## 2022-08-07 RX ADMIN — Medication 400 MILLIGRAM(S): at 09:34

## 2022-08-07 RX ADMIN — Medication 400 MILLIGRAM(S): at 11:03

## 2022-08-07 RX ADMIN — Medication 187.5 MILLIGRAM(S): at 09:34

## 2022-08-07 RX ADMIN — Medication 0.5 MILLIGRAM(S): at 17:33

## 2022-08-07 RX ADMIN — Medication 50 MILLIGRAM(S): at 21:27

## 2022-08-07 RX ADMIN — Medication 0.5 MILLIGRAM(S): at 09:34

## 2022-08-07 RX ADMIN — QUETIAPINE FUMARATE 50 MILLIGRAM(S): 200 TABLET, FILM COATED ORAL at 21:27

## 2022-08-08 PROCEDURE — 90870 ELECTROCONVULSIVE THERAPY: CPT

## 2022-08-08 PROCEDURE — 99232 SBSQ HOSP IP/OBS MODERATE 35: CPT

## 2022-08-08 RX ORDER — ACETAMINOPHEN 500 MG
650 TABLET ORAL ONCE
Refills: 0 | Status: COMPLETED | OUTPATIENT
Start: 2022-08-08 | End: 2022-08-08

## 2022-08-08 RX ADMIN — QUETIAPINE FUMARATE 50 MILLIGRAM(S): 200 TABLET, FILM COATED ORAL at 21:07

## 2022-08-08 RX ADMIN — Medication 50 MILLIGRAM(S): at 21:07

## 2022-08-08 RX ADMIN — Medication 400 MILLIGRAM(S): at 11:24

## 2022-08-08 RX ADMIN — Medication 187.5 MILLIGRAM(S): at 10:22

## 2022-08-08 RX ADMIN — Medication 650 MILLIGRAM(S): at 14:45

## 2022-08-08 RX ADMIN — Medication 650 MILLIGRAM(S): at 15:53

## 2022-08-08 RX ADMIN — Medication 400 MILLIGRAM(S): at 22:22

## 2022-08-08 RX ADMIN — Medication 0.5 MILLIGRAM(S): at 10:44

## 2022-08-08 RX ADMIN — Medication 400 MILLIGRAM(S): at 21:08

## 2022-08-08 RX ADMIN — Medication 400 MILLIGRAM(S): at 10:23

## 2022-08-08 NOTE — BH INPATIENT PSYCHIATRY PROGRESS NOTE - NSBHMSEMOOD_PSY_A_CORE
Mood is better, no anhedonia, but still anxious./Normal/Anxious Mood is better, no anhedonia, but still anxious./Anxious

## 2022-08-08 NOTE — BH INPATIENT PSYCHIATRY PROGRESS NOTE - CURRENT MEDICATION
MEDICATIONS  (STANDING):  QUEtiapine 50 milliGRAM(s) Oral at bedtime  venlafaxine .5 milliGRAM(s) Oral daily    MEDICATIONS  (PRN):  ibuprofen  Tablet. 400 milliGRAM(s) Oral every 6 hours PRN Moderate Pain (4 - 6)  LORazepam     Tablet 0.5 milliGRAM(s) Oral three times a day PRN moderate to severe anxiety  polyethylene glycol 3350 17 Gram(s) Oral at bedtime PRN constipation  traZODone 50 milliGRAM(s) Oral at bedtime PRN insomnia

## 2022-08-08 NOTE — BH PSYCHOLOGY - ADMISSION/NEUROPSYCHOLOGY NOTE - NSBHPSYCHOLOCC_PSY_A_CORE FT
Reportedly works as a , with no recent publications as per chart report.  Ms. Elder has a high level of academic and professional success. She has been most recently employed as a , but has struggled to make deadlines recently.

## 2022-08-08 NOTE — BH INPATIENT PSYCHIATRY PROGRESS NOTE - NSBHFUPINTERVALHXFT_PSY_A_CORE
Ms. Friedman is calm, cooperative, and A&Ox4. She reports that she is "sleepy" now since she did not have a good night. She only slept well for about half the night. This was in part due to anxiety about life and treatment. She expressed concern that she "could not make it through life" with this kind of anxiety. She also feels "sorry about [her] life" that she has to go through ECT. She is worried about the possibility of not using Ativan for her anxiety, but she understands that it may interfere with ECT. She is open to trying other options (Atarax) to control her anxiety and help her sleep. Her hope is that with continued ECT treatment for her depression will also improve her anxiety, and she won't feel the need to anxiolytics as much. After ECT this morning, she reports feeling dizzy and tired, so she has not been able to shower, which distresses her. She denies SI/HI.

## 2022-08-08 NOTE — BH PSYCHOLOGY - ADMISSION/NEUROPSYCHOLOGY NOTE - NSBHPSYCHOLPROC_PSY_A_CORE FT
Mrs. Friedman’s auditory and visual motor coordination were in the Average range (Digit Span= 91st percentile, High Average; Coding= 9th percentile. Low Average). It is possible that Mrs. Friedman’s motor coordination is affected by age, her depression and anxiety, concluding in a slower response. Nevertheless, she is performing in the Average range.  Mrs. Friedman’s auditory attention was in the high average range, when it comes to rote repetition of novel information. and visual motor coordination were debra in the Average range (Digit Span= 91st percentile, High Average). Her visual-motor coordination on a timed coding task was low average relative to same-aged peers, possibly impacted by anxiety, depression, and visual processing issues (Coding= 9th percentile, Low Average).

## 2022-08-08 NOTE — BH INPATIENT PSYCHIATRY PROGRESS NOTE - NSBHFUPINTERVALHXFT_PSY_A_CORE
Ms. Friedman is calm, cooperative, and A&Ox3. She reports that she is "sleepy" now since she did not have a good night. She only slept well for about half the night. This was in part due to anxiety about life and treatment. She expressed concern that she "could not make it through life" with this kind of anxiety. She also feels "sorry about [her] life" that she has to go through ECT. She is worried about the possibility of not using Ativan for her anxiety, but she understands that it may interfere with ECT. She is open to trying other options (Atarax) to control her anxiety and help her sleep. Her hope is that with continued ECT treatment for her depression will also improve her anxiety, and she won't feel the need to anxiolytics as much. After ECT this morning, she reports feeling dizzy and tired, so she has not been able to shower, which distresses her. She denies SI/HI.

## 2022-08-08 NOTE — BH PSYCHOLOGY - ADMISSION/NEUROPSYCHOLOGY NOTE - HPI (INCLUDE ILLNESS QUALITY, SEVERITY, DURATION, TIMING, CONTEXT, MODIFYING FACTORS, ASSOCIATED SIGNS AND SYMPTOMS)
Mrs. Friedman is a 66yo female with no dependents, domiciled, and unemployed but working [employer]. She is  from her  for 8 years now. Prior to her hospitalization, she had been increasingly depressed and anxious, losing interest in activities she previously enjoyed, motivation and energy. She had two sessions of ECT treatment, was discharged from [facility], but was voluntarily readmitted due to her worsening depressive symptoms. She indicated that she would like more ECT sessions. Staff on the inpatient unit expressed that she could be assessed for cognitive impairment to rule out any other interacting issues in her presentation.  Mrs. Friedman is a 68yo female with no dependents, domiciled in a private apartment, working as a . She has been  from her  for 8 years now. Prior to her hospitalization, she had been increasingly depressed and anxious, losing interest in activities she previously enjoyed, motivation and energy. On a recent admission to [facility], she had two sessions of ECT treatment, was discharged on her own request prior to the treatment team's recommendations, but then sought voluntarily readmission due to her worsening depressive symptoms. On admission, she requested to complete more ECT sessions. Staff on the inpatient unit expressed that her cognitive functioning should be assessed, to clarify factors impacting her current presentation.

## 2022-08-08 NOTE — BH INPATIENT PSYCHIATRY PROGRESS NOTE - PRN MEDS
MEDICATIONS  (PRN):  ibuprofen  Tablet. 400 milliGRAM(s) Oral every 6 hours PRN Moderate Pain (4 - 6)  LORazepam     Tablet 0.5 milliGRAM(s) Oral three times a day PRN moderate to severe anxiety  polyethylene glycol 3350 17 Gram(s) Oral at bedtime PRN constipation  traZODone 50 milliGRAM(s) Oral at bedtime PRN insomnia

## 2022-08-08 NOTE — BH INPATIENT PSYCHIATRY PROGRESS NOTE - NSBHCHARTREVIEWVS_PSY_A_CORE FT
Vital Signs Last 24 Hrs  T(C): 36.6 (08-08-22 @ 08:38), Max: 37.3 (08-08-22 @ 06:00)  T(F): 97.9 (08-08-22 @ 08:38), Max: 99.1 (08-08-22 @ 06:00)  HR: 98 (08-08-22 @ 09:13) (98 - 114)  BP: 143/91 (08-08-22 @ 08:58) (117/79 - 152/98)  BP(mean): 112 (08-08-22 @ 08:58) (110 - 120)  RR: 50 (08-08-22 @ 09:13) (16 - 50)  SpO2: 96% (08-08-22 @ 09:13) (95% - 100%)     Vital Signs Last 24 Hrs  T(C): 36.7 (08-08-22 @ 19:15), Max: 37.3 (08-08-22 @ 06:00)  T(F): 98 (08-08-22 @ 19:15), Max: 99.1 (08-08-22 @ 06:00)  HR: 112 (08-08-22 @ 19:15) (98 - 114)  BP: 129/87 (08-08-22 @ 19:15) (117/79 - 152/98)  BP(mean): 112 (08-08-22 @ 08:58) (110 - 120)  RR: 20 (08-08-22 @ 19:15) (16 - 50)  SpO2: 96% (08-08-22 @ 19:15) (95% - 100%)

## 2022-08-08 NOTE — BH PSYCHOLOGY - ADMISSION/NEUROPSYCHOLOGY NOTE - NSBHPSYCHOLEXFUNC_PSY_A_CORE FT
Mrs. Friedman was administered the RBANS-Update, a comprehensive screening that assesses a range of abilities associated with attention, concentration, organization, problem solving, memory and language skills. Her performance was task dependent. Her total score was in the 32nd percentile and suggests Average functioning relative to same-aged peers. She did not have any significant variability or contrast in scores amongst different tasks, scoring within the Average range for all, except for one of the activities.

## 2022-08-08 NOTE — PACU DISCHARGE NOTE - COMMENTS
Pt post ECT, neurologically and hemodynamically stable. Has met PACU discharge criteria and is accompanied to floor with Bushra Manager. Transported via W/C.
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
## 2022-08-08 NOTE — BH PSYCHOLOGY - ADMISSION/NEUROPSYCHOLOGY NOTE - NSBHPSYCHOLVISUO_PSY_A_CORE FT
Visual Memory: When asked to draw a complex figure by looking at the figure, Mrs. Friedman performed in the Average range (25th percentile). When asked to redraw the figure from memory after a delay, Mrs. Friedman performed in the Borderline range (5th percentile), suggesting that visual copying was manageable, but simultaneous processing of visual memory and motor coordination was much more difficult for her. Overall, lines were shaky, but showed resemblance to the original picture. During this task, Mrs. Friedman seemed uncertain and would scratch out something she felt she had drawn incorrectly.   Overall Delayed Memory: Mrs. Friedman performed in the Average range amongst all tasks that required delayed memory, recall and recognition (30th percentile).   Overall Visual Spatial Functioning: Mrs. Friedman completed a task of visual spatial awareness, where she performed in the Average range. Overall, this domain consists of two tasks, visual copying as described above and orientation to lines. She performed in the Low Average to Average range (23rd percentile), indicating that she might struggle with visuospatial or constructional tasks, her lowest performing domain, compared to other types of tasks. Nevertheless, she is still performing relative to her same-aged peers.    Visual Memory: When asked to draw a complex figure by looking at the figure, Mrs. Friedman performed in the low end of the Average range (25th percentile). When asked to redraw the figure from memory after a delay, Mrs. Friedman performed in the Borderline range (5th percentile). Thus, her ability to engage in planning, organization and copying a complex figure was less well developed than expected, in line with executive dysfunction. Overall, lines were shaky, but showed resemblance to the original picture. During this task, Mrs. Friedman seemed uncertain and would scratch out something she felt she had drawn incorrectly.   Overall Delayed Memory: Mrs. Friedman performed in the Average range amongst all tasks that required delayed memory, recall and recognition (30th percentile).   Overall Visual Spatial Functioning: Mrs. Friedman completed a task of visual spatial awareness, where she performed in the Average range. Overall, this domain consists of two tasks, visual copying as described above and orientation to lines. She performed in the Low Average to Average range (23rd percentile), indicating that she might struggle with visuospatial or constructional tasks, her lowest performing domain, compared to other types of tasks.

## 2022-08-08 NOTE — BH PSYCHOLOGY - ADMISSION/NEUROPSYCHOLOGY NOTE - OTHER PAST PSYCHIATRIC HISTORY (INCLUDE DETAILS REGARDING ONSET, COURSE OF ILLNESS, INPATIENT/OUTPATIENT TREATMENT)
As per Mrs. Friedman’s chart and interview, she has a past psychiatric history of narcissistic & borderline personality disorder and major depressive disorder. She has a history of 2 inpatient psychiatric hospitalizations and has undergone electroconvulsive therapy (ECT)  most recently at [facility] (07/22/22 - 07/28/22) and another one, reportedly, 10 years ago. She denied history of suicide attempts or self-harm.   Mrs. Friedman brought herself back to the inpatient unit after a recent session of ECT and after being referred by an outpatient psychiatrist for worsening depressive symptoms since her discharge from [facility]. Mrs. Friedman reported that she is awaiting her next ECT session.     Mrs. Friedman reported that she has had previous episodes of major depression but that is has never been “like this”, indicating that this is her worst depressive episode. She stated that she does not understand why, and denied any potential triggers for her depressive experiences.      Per Mrs. Friedman’s chart and clinical interview, she has a past psychiatric history that includes dx of narcissistic & borderline personality disorder and major depressive disorder. She has a history of 2 inpatient psychiatric hospitalizations and has undergone electroconvulsive therapy (ECT)  most recently at [facility] (07/22/22 - 07/28/22) and previously about 10 years ago. She denied history of suicide attempts or self-harm.   Mrs. Friedman brought herself back to the inpatient unit with persistently depressed mood and significant anxiety, and after being referred by an outpatient psychiatrist for worsening depressive symptoms since her discharge from [facility]. Mrs. Friedman reported that she is awaiting her next ECT session.     Mrs. Friedman reported that she has had previous episodes of major depression but that is has never been “like this”, indicating that this is her worst depressive episode. She stated that she does not understand why, and denied any potential triggers for her depressive experiences.   Contributing factors include recent mitrovalve replacement, and psychosocial factors.

## 2022-08-08 NOTE — BH PSYCHOLOGY - ADMISSION/NEUROPSYCHOLOGY NOTE - DESCRIPTION
As per records and according to Mrs. Friedman, she does not have any indicated signs of cognitive or developmental disability. As per patient chart, Mrs. Friedman has a surgically repaired MVP (mitral valve repair) in March of 2022. There are no known allergies and Mrs. Friedman denies any other medical history.

## 2022-08-08 NOTE — BH PSYCHOLOGY - ADMISSION/NEUROPSYCHOLOGY NOTE - NSBHPSYCHOLCOMP_PSY_A_CORE FT
Mrs. Friedman’s performance on the language domain of the RBANS, wherein she was asked to recognize and name pictures as well as orally list items belonging to a particular category, was in the Average range (45th percentile), indicating language function at the same level relative to peers. In the first task, Mrs. Friedman was able to answer 9 out of the 10 pictures correctly, with the only mistake being she called a sea horse, “shoe horse”, thus indicating some knowledge of naming but a potential difficulty in accessing the word. In the second task, it is notable that Mrs. Friedman stated that she had “said that already”, when she had not actually repeated an item prior, thus indicating some impact on memory.     Verbal Memory: Mrs. Friedman’s performance in the Immediate Memory domain, consisting of two tasks of list learning and story memory, was in the Average range (42nd percentile). On a test of list learning and recognition, Mrs. Friedman’s performance on later trials allowed her to consolidate most of the items from the list, remembering every word except 3 on the fourth and last trial. However, she was only able to remember 3 words from the list after a delay, indicating some emphasis of problems with learning new information. This may also be exacerbated by the depression. However, Mrs. Friedman’s recognition of the items was excellent, indicating knowledge of every word except one. This suggests that Mrs. Friedman benefits from verbal cues to facilitate memory. On another task, when asked to recall story details, her recall was within the Average range.  Mrs. Friedman’s performance on a task of confrontation naming was in the Average range (45th percentile), indicating language function at the same level relative to peers. In the first task, Mrs. Friedman was able to answer 9 out of the 10 pictures correctly, with the only mistake being she called a sea horse, “shoe horse”, thus indicating some knowledge of naming but a potential retrieval issue. In the second task, it is notable that Ms. Friedman stated that she had “said that already”, when she had not actually repeated an item prior.     Verbal Memory: Mrs. Friedman’s performance in the Immediate Memory domain, consisting of two tasks of list learning and story memory, was in the Average range (42nd percentile). However, she was only able to remember 3 words from the list after a delay, indicating some emphasis of problems with learning new information, with consolidation and retrieval. This may also be exacerbated by the depression. This is supported by the fact that Ms. Friedman’s recognition of the items was within expectations, able to correctly identify every word except one. This suggests that Mrs. Friedman benefits from verbal cues to facilitate memory. On another task, when asked to recall story details, her recall was within the Average range.

## 2022-08-08 NOTE — BH INPATIENT PSYCHIATRY PROGRESS NOTE - NSBHMETABOLIC_PSY_ALL_CORE_FT
BMI: BMI (kg/m2): 21.8 (07-31-22 @ 17:40)  HbA1c: A1C with Estimated Average Glucose Result: 5.4 % (08-04-22 @ 07:23)    Glucose:   BP: 143/91 (08-08-22 @ 08:58) (111/78 - 153/104)  Lipid Panel: Date/Time: 08-04-22 @ 07:23  Cholesterol, Serum: 218  Direct LDL: --  HDL Cholesterol, Serum: 86  Total Cholesterol/HDL Ration Measurement: --  Triglycerides, Serum: 91   BMI: BMI (kg/m2): 21.8 (07-31-22 @ 17:40)  HbA1c: A1C with Estimated Average Glucose Result: 5.4 % (08-04-22 @ 07:23)    Glucose:   BP: 129/87 (08-08-22 @ 19:15) (111/78 - 152/98)  Lipid Panel: Date/Time: 08-04-22 @ 07:23  Cholesterol, Serum: 218  Direct LDL: --  HDL Cholesterol, Serum: 86  Total Cholesterol/HDL Ration Measurement: --  Triglycerides, Serum: 91

## 2022-08-08 NOTE — BH INPATIENT PSYCHIATRY PROGRESS NOTE - NSBHMETABOLIC_PSY_ALL_CORE_FT
BMI: BMI (kg/m2): 21.8 (07-31-22 @ 17:40)  HbA1c: A1C with Estimated Average Glucose Result: 5.4 % (08-04-22 @ 07:23)    Glucose:   BP: 143/91 (08-08-22 @ 08:58) (111/78 - 153/104)  Lipid Panel: Date/Time: 08-04-22 @ 07:23  Cholesterol, Serum: 218  Direct LDL: --  HDL Cholesterol, Serum: 86  Total Cholesterol/HDL Ration Measurement: --  Triglycerides, Serum: 91

## 2022-08-08 NOTE — BH PSYCHOLOGY - ADMISSION/NEUROPSYCHOLOGY NOTE - NSBHPSYCHOLCURMEDS_PSY_A_CORE FT
1)	LORazipam tablet 0.5 mg, oral, 3 times a day, for moderate to severe anxiety,   2)	QUEtiapine, 50mg, oral, at bedtime for mood/insomnia

## 2022-08-08 NOTE — BH INPATIENT PSYCHIATRY PROGRESS NOTE - NSBHASSESSSUMMFT_PSY_ALL_CORE
This is a 66-year-old domiciled female with PPHx of depression, self-reported narcissistic and borderline personality disorders, 1PPH in 2012 for depression and received 8 unilateral ECT treatments per patient, no pSA/NSSIB, BIBS for worsening depressive symptoms since a mitral valve relapse surgery in 3/2022 and patient feeling overwhelmed/more anxious having had no prior medical ailments and then requiring a heart surgery this past March. Patient stable and has routine f/u and TTE in the fall of this year, no other PMH. No substance use history.     Patient recently admitted and receive 2 ECT treatments with good effect but decided to leave as she felt better but two days after discharge on 7/30 she awoke feeling depressed again and decided to return to the hospital for more ECT.      8/1: reviewed past recommendations and suggested that Abilify should be discontinued and question efficacy of ECT.  Will review with TEAM and ECT value of continuing ECT vs med management.   8/3: well groomed, anxious, logical, ativan-focused, elevated BP to 170s, compliant to starting Seroquel, medicine team consulted for ECT clearance and high BP, Seroquel started 50mg qhs   ;;08/04: patient will try Seroquel tonight; being cleared for ECT but concerns about high bp but Effexor being lowered to 187.5mg daily.  Abilify has been discontinued.   This is a 66-year-old domiciled female with PPHx of depression, self-reported narcissistic and borderline personality disorders, 1PPH in 2012 for depression and received 8 unilateral ECT treatments per patient, no pSA/NSSIB, BIBS for worsening depressive symptoms since a mitral valve relapse surgery in 3/2022 and patient feeling overwhelmed/more anxious having had no prior medical ailments and then requiring a heart surgery this past March. Patient stable and has routine f/u and TTE in the fall of this year, no other PMH. No substance use history.     Patient recently admitted and receive 2 ECT treatments with good effect but decided to leave as she felt better but two days after discharge on 7/30 she awoke feeling depressed again and decided to return to the hospital for more ECT.      8/1: reviewed past recommendations and suggested that Abilify should be discontinued and question efficacy of ECT.  Will review with TEAM and ECT value of continuing ECT vs med management.   8/3: well groomed, anxious, logical, ativan-focused, elevated BP to 170s, compliant to starting Seroquel, medicine team consulted for ECT clearance and high BP, Seroquel started 50mg qhs   ;;08/04: patient will try Seroquel tonight; being cleared for ECT but concerns about high bp but Effexor being lowered to 187.5mg daily.  Abilify has been discontinued.  - 08/08: patient had ECT this morning. No acute events observed. She felt tired after the session. Still endorsing depressed mood and anxiety. Will continue to adjust medications.

## 2022-08-08 NOTE — BH INPATIENT PSYCHIATRY PROGRESS NOTE - NSBHCHARTREVIEWVS_PSY_A_CORE FT
Vital Signs Last 24 Hrs  T(C): 36.6 (08-08-22 @ 08:38), Max: 37.3 (08-08-22 @ 06:00)  T(F): 97.9 (08-08-22 @ 08:38), Max: 99.1 (08-08-22 @ 06:00)  HR: 98 (08-08-22 @ 09:13) (98 - 114)  BP: 143/91 (08-08-22 @ 08:58) (117/79 - 152/98)  BP(mean): 112 (08-08-22 @ 08:58) (110 - 120)  RR: 50 (08-08-22 @ 09:13) (16 - 50)  SpO2: 96% (08-08-22 @ 09:13) (95% - 100%)

## 2022-08-09 LAB — SARS-COV-2 RNA SPEC QL NAA+PROBE: SIGNIFICANT CHANGE UP

## 2022-08-09 PROCEDURE — 99232 SBSQ HOSP IP/OBS MODERATE 35: CPT

## 2022-08-09 RX ORDER — HYDROXYZINE HCL 10 MG
50 TABLET ORAL EVERY 6 HOURS
Refills: 0 | Status: DISCONTINUED | OUTPATIENT
Start: 2022-08-09 | End: 2022-08-10

## 2022-08-09 RX ORDER — QUETIAPINE FUMARATE 200 MG/1
100 TABLET, FILM COATED ORAL AT BEDTIME
Refills: 0 | Status: DISCONTINUED | OUTPATIENT
Start: 2022-08-09 | End: 2022-08-10

## 2022-08-09 RX ADMIN — Medication 50 MILLIGRAM(S): at 18:46

## 2022-08-09 RX ADMIN — Medication 50 MILLIGRAM(S): at 12:33

## 2022-08-09 RX ADMIN — Medication 187.5 MILLIGRAM(S): at 09:50

## 2022-08-09 RX ADMIN — QUETIAPINE FUMARATE 100 MILLIGRAM(S): 200 TABLET, FILM COATED ORAL at 21:25

## 2022-08-09 RX ADMIN — Medication 50 MILLIGRAM(S): at 21:25

## 2022-08-09 NOTE — BH TREATMENT PLAN - NSTXECTINTERMD_PSY_ALL_CORE
2 sessions so far. Seems to present an initial improvement. Psychopharm management x 15 min daily. Individual therapy.

## 2022-08-09 NOTE — BH PSYCHOLOGY - CLINICIAN PSYCHOTHERAPY NOTE - NSBHPSYCHOLNARRATIVE_PSY_A_CORE FT
Balbina is a 66-year-old domiciled,  female with a history of depression and self-reported personality disorders, who self-presented with worsening depressive symptoms following heart surgery in March though without other clear precipitating factors. She was recently discharged from [facility], and then was voluntarily readmitted given worsening depressive symptoms. She has stated desiring continued ECT sessions.     SESSION CONTENT: Balbina reported that she was feeling somewhat "perkier" after her ECT session the previous day, although she was not sure if she was making that up in her mind. She reported that she would be getting another session of ECT the next day as well as one on Friday. She stated that she did feel "a bit tired today" as she had had a bad dream the night before. She strongly resisted any discussion of the dream, reporting "I do not want to talk about it right now...I need to draw the limits somewhere". She reported looking forward to her visitor soon, whom she confirmed was her , and appeared excited to see him. She described him as her strongest support system. She was resistant to individual therapy, stating that she did not want to talk because she wants to be in a clear headspace when her visitor arrives. She disclosed that she appreciated the therapist's time and stated she would be more talkative when she begins to feel better, gathering information about how to find the therapist again when she is able to. She ended the session, stating she was tired and wanted to sleep prior to her visitor. The therapist discussed motivation for therapy even she does not feel "better" and both agreed she would continue to talk to the therapist at a later time.   BEHAVIORAL OBSERVATIONS: Balbina was resistant to therapy and goal-oriented towards the idea of "feeling better", but appeared to be less agitated than during previous interactions with this clinician. She was observed to be more anxious, which she reported as well.   RISK FACTORS:                          Static - Hx of depressive episodes, Hx of heart surgery                          Modifiable - improved mood, response to ECT                          Protective - Stable housing, outpatient care, friends and social support,   APPEARANCE:  [x] adequately groomed [] disheveled [] malodorous [] Other:   BEHAVIOR: [] cooperative [x] uncooperative [x] good EC [] poor EC [x] well related [] oddly related [x] guarded []PMA [] PMR []abnormal movements [] Other:   SPEED: [x] normal rate/rhythm/volume [] loud [] quiet [] slow [] rapid [x] pressured [] Other: _________   MOOD: [] euthymic [] dysphoric [x] anxious [x] irritable [] Other: ___________   AFFECT: [] full [] expansive [x] constricted [] blunted [] flat [x] stable [] labile [] Other: ________________ THOUGHT PROCESS: [] organized [] disorganized [x] goal-directed [x] concrete [x] logical [] illogical   [] circumstantial [] tangential [] impoverished [] effusive [] repetitive [] Other:  THOUGHT CONTENT: [] negative for delusions/suicidal ideation /homicidal ideation [] positive for delusions/suicidal ideation/homicidal ideation [x] Unable to assess Describe:   PERCEPTION: [] negative for auditory/ visual hallucinations [] positive for auditory/ visual hallucinations [x] Unable to assess Describe: ________________________________________________________   INSIGHT/JUDGMENT: [] good [x]fair [] poor        IMPULSE CONTROL: [x] good []fair [] poor     COGNITION: [x] alert and oriented to person, time, place [] Lacks orientation to person/time/place. Describe: ___________________________

## 2022-08-09 NOTE — BH INPATIENT PSYCHIATRY PROGRESS NOTE - CURRENT MEDICATION
MEDICATIONS  (STANDING):  QUEtiapine 100 milliGRAM(s) Oral at bedtime  venlafaxine .5 milliGRAM(s) Oral daily    MEDICATIONS  (PRN):  hydrOXYzine hydrochloride 50 milliGRAM(s) Oral every 6 hours PRN Anxiety  ibuprofen  Tablet. 400 milliGRAM(s) Oral every 6 hours PRN Moderate Pain (4 - 6)  polyethylene glycol 3350 17 Gram(s) Oral at bedtime PRN constipation  traZODone 50 milliGRAM(s) Oral at bedtime PRN insomnia

## 2022-08-09 NOTE — BH INPATIENT PSYCHIATRY PROGRESS NOTE - NSBHASSESSSUMMFT_PSY_ALL_CORE
This is a 66-year-old domiciled female with PPHx of depression, self-reported narcissistic and borderline personality disorders, 1PPH in 2012 for depression and received 8 unilateral ECT treatments per patient, no pSA/NSSIB, BIBS for worsening depressive symptoms since a mitral valve relapse surgery in 3/2022 and patient feeling overwhelmed/more anxious having had no prior medical ailments and then requiring a heart surgery this past March. Patient stable and has routine f/u and TTE in the fall of this year, no other PMH. No substance use history.     Patient recently admitted and receive 2 ECT treatments with good effect but decided to leave as she felt better but two days after discharge on 7/30 she awoke feeling depressed again and decided to return to the hospital for more ECT.      8/1: reviewed past recommendations and suggested that Abilify should be discontinued and question efficacy of ECT.  Will review with TEAM and ECT value of continuing ECT vs med management.   8/3: well groomed, anxious, logical, ativan-focused, elevated BP to 170s, compliant to starting Seroquel, medicine team consulted for ECT clearance and high BP, Seroquel started 50mg qhs   ;;08/04: patient will try Seroquel tonight; being cleared for ECT but concerns about high bp but Effexor being lowered to 187.5mg daily.  Abilify has been discontinued.    - 08/08: patient had ECT this morning. No acute events observed. She felt tired after the session. Still endorsing depressed mood and anxiety. Will continue to adjust medications.    - 08/09: patient still very anxious, but reports no headache or dizziness. No acute events. Complains of anhedonia and abulia. Will have another session of ECT tomorrow. Agreed to switch Ativan to Atarax to decrease interaction with ECT.

## 2022-08-09 NOTE — BH INPATIENT PSYCHIATRY PROGRESS NOTE - NSBHFUPINTERVALHXFT_PSY_A_CORE
Patient was interviewed today for follow up. She said she slept well last night, but is still very anxious. She asks about reassurance multiple times: if she will have ECT tomorrow, if it is ok to have it 3 times a week, if she will get better soon, among other preoccupations. She also complains about lack of interest in things such as attending groups or reading the [employer]. She was reassured that ECT takes more time to work. She is taking Ativan to relieve her anxiety and was reluctant to stop using it because it interferes with ECT. However, she agreed to switch it to Atarax.

## 2022-08-09 NOTE — BH INPATIENT PSYCHIATRY PROGRESS NOTE - NSBHCHARTREVIEWVS_PSY_A_CORE FT
Vital Signs Last 24 Hrs  T(C): 37.1 (08-09-22 @ 06:14), Max: 37.1 (08-08-22 @ 18:05)  T(F): 98.8 (08-09-22 @ 06:14), Max: 98.8 (08-09-22 @ 06:14)  HR: 102 (08-09-22 @ 06:14) (102 - 112)  BP: 124/75 (08-09-22 @ 06:14) (124/75 - 134/89)  BP(mean): --  RR: 18 (08-09-22 @ 06:14) (17 - 20)  SpO2: 96% (08-09-22 @ 06:14) (96% - 96%)     Vital Signs Last 24 Hrs  T(C): 37.1 (08-09-22 @ 10:37), Max: 37.1 (08-08-22 @ 18:05)  T(F): 98.8 (08-09-22 @ 10:37), Max: 98.8 (08-09-22 @ 06:14)  HR: 114 (08-09-22 @ 10:37) (102 - 114)  BP: 133/88 (08-09-22 @ 10:37) (124/75 - 134/89)  BP(mean): --  RR: 20 (08-09-22 @ 10:37) (17 - 20)  SpO2: 97% (08-09-22 @ 10:37) (96% - 97%)

## 2022-08-09 NOTE — BH INPATIENT PSYCHIATRY PROGRESS NOTE - NSBHMETABOLIC_PSY_ALL_CORE_FT
BMI: BMI (kg/m2): 21.8 (07-31-22 @ 17:40)  HbA1c: A1C with Estimated Average Glucose Result: 5.4 % (08-04-22 @ 07:23)    Glucose:   BP: 124/75 (08-09-22 @ 06:14) (111/78 - 152/98)  Lipid Panel: Date/Time: 08-04-22 @ 07:23  Cholesterol, Serum: 218  Direct LDL: --  HDL Cholesterol, Serum: 86  Total Cholesterol/HDL Ration Measurement: --  Triglycerides, Serum: 91   BMI: BMI (kg/m2): 21.8 (07-31-22 @ 17:40)  HbA1c: A1C with Estimated Average Glucose Result: 5.4 % (08-04-22 @ 07:23)    Glucose:   BP: 133/88 (08-09-22 @ 10:37) (111/78 - 152/98)  Lipid Panel: Date/Time: 08-04-22 @ 07:23  Cholesterol, Serum: 218  Direct LDL: --  HDL Cholesterol, Serum: 86  Total Cholesterol/HDL Ration Measurement: --  Triglycerides, Serum: 91

## 2022-08-09 NOTE — BH INPATIENT PSYCHIATRY PROGRESS NOTE - PRN MEDS
Fever, cough, throwing up  Throat is red, strep test is pending    Symptomatic treatments  Humidifier, vicks vapor rub, honey  Tylenol & ibuprofen as needed for fever and discomfort  Encouraged 14 day self quarantine   Return to clinic if symptoms persist or worsen  Patient Education     Influenza (Child)    Influenza is also called the flu. It is a viral illness that affects the air passages of your lungs. It is different from the common cold. The flu can easily be passed from one to person to another. It may be spread through the air by coughing and sneezing. Or it can be spread by touching the sick person and then touching your own eyes, nose, or mouth.  Symptoms of the flu may be mild or severe. They can include extreme tiredness (wanting to stay in bed all day), chills, fevers, muscle aches, soreness with eye movement, headache, and a dry, hacking cough.  Your child usually won t need to take antibiotics, unless he or she has a complication. This might be an ear or sinus infection or pneumonia.  Home care  Follow these guidelines when caring for your child at home:    Fluids. Fever increases the amount of water your child loses from his or her body. For babies younger than 1 year old, keep giving regular feedings (formula or breast). Talk with your child s healthcare provider to find out how much fluid your baby should be getting. If needed, give an oral rehydration solution. You can buy this at the grocery or pharmacy without a prescription. For a child older than 1 year, give him or her more fluids and continue his or her normal diet. If your child is dehydrated, give an oral rehydration solution. Go back to your child s normal diet as soon as possible. If your child has diarrhea, don t give juice, flavored gelatin water, soft drinks without caffeine, lemonade, fruit drinks, or popsicles. This may make diarrhea worse.    Food. If your child doesn t want to eat solid foods, it s OK for a few days. Make sure  your child drinks lots of fluid and has a normal amount of urine.    Activity. Keep children with fever at home resting or playing quietly. Encourage your child to take naps. Your child may go back to  or school when the fever is gone for at least 24 hours. The fever should be gone without giving your child acetaminophen or other medicine to reduce fever. Your child should also be eating well and feeling better.    Sleep. It s normal for your child to be unable to sleep or be irritable if he or she has the flu. A child who has congestion will sleep best with his or her head and upper body raised up. Or you can raise the head of the bed frame on a 6-inch block.    Cough. Coughing is a normal part of the flu. You can use a cool mist humidifier at the bedside. Don t give over-the-counter cough and cold medicines to children younger than 6 years of age, unless the healthcare provider tells you to do so. These medicines don t help ease symptoms. And they can cause serious side effects, especially in babies younger than 2 years of age. Don t allow anyone to smoke around your child. Smoke can make the cough worse.    Nasal congestion. Use a rubber bulb syringe to suction the nose of a baby. You may put 2 to 3 drops of saltwater (saline) nose drops in each nostril before suctioning. This will help remove secretions. You can buy saline nose drops without a prescription. You can make the drops yourself by adding 1/4 teaspoon table salt to 1 cup of water.    Fever. Use acetaminophen to control pain, unless another medicine was prescribed. In infants older than 6 months of age, you may use ibuprofen instead of acetaminophen. If your child has chronic liver or kidney disease, talk with your child s provider before using these medicines. Also talk with the provider if your child has ever had a stomach ulcer or GI (gastrointestinal) bleeding. Don t give aspirin to anyone younger than 18 years old who is ill with a fever. It  "may cause severe liver damage.  Follow-up care  Follow up with your child s healthcare provider, or as advised.  When to seek medical advice  Call your child s healthcare provider right away if any of these occur:    Your child has a fever, as directed by the healthcare provider, or:  ? Your child is younger than 12 weeks old and has a fever of 100.4 F (38 C) or higher. Your baby may need to be seen by a healthcare provider.  ? Your child has repeated fevers above 104 F (40 C) at any age.  ? Your child is younger than 2 years old and his or her fever continues for more than 24 hours.  ? Your child is 2 years old or older and his or her fever continues for more than 3 days.    Fast breathing. In a child age 6 weeks to 2 years, this is more than 45 breaths per minute. In a child 3 to 6 years, this is more than 35 breaths per minute. In a child 7 to 10 years, this is more than 30 breaths per minute. In a child older than 10 years, this is more than 25 breaths per minute.    Earache, sinus pain, stiff or painful neck, headache, or repeated diarrhea or vomiting    Unusual fussiness, drowsiness, or confusion    Your child doesn t interact with you as he or she normally does    Your child doesn t want to be held    Your child is not drinking enough fluid. This may show as no tears when crying, or \"sunken\" eyes or dry mouth. It may also be no wet diapers for 8 hours in a baby. Or it may be less urine than usual in older children.    Rash with fever  Date Last Reviewed: 1/1/2017 2000-2019 [employer]. [address] [place] [id number]. All rights reserved. This information is not intended as a substitute for professional medical care. Always follow your healthcare professional's instructions.           Patient Education     Pharyngitis (Sore Throat), Report Pending    Pharyngitis (sore throat) is often due to a virus. It can also be caused by streptococcus (strep), bacteria. This is often called " strep throat. Both viral and strep infections can cause throat pain that is worse when swallowing, aching all over, headache, and fever. Both types of infections are contagious. They may be spread by coughing, kissing, or touching others after touching your mouth or nose.  A test has been done to find out if you or your child have strep throat. Call this facility or your healthcare provider if you were not given your test results. If the test is positive for strep infection, you will need to take antibiotic medicines. A prescription can be called into your pharmacy at that time. If the test is negative, you probably have a viral pharyngitis. This does not need to be treated with antibiotics. Until you receive the results of the strep test, you should stay home from work. If your child is being tested, he or she should stay home from school.  Home care    Rest at home. Drink plenty of fluids so you won't get dehydrated.    If the test is positive for strep, you or your child should not go to work or school for the first 2 days of taking the antibiotics. After this time, you or your child will not be contagious. You or your child can then return to work or school when feeling better.     Use the antibiotic medicine for the full 10 days. Do not stop the medicine even if you or your child feel better. This is very important to make sure the infection is fully treated. It is also important to prevent medicine-resistant germs from growing. If you or your child were given an antibiotic shot, no more antibiotics are needed.    Use throat lozenges or numbing throat sprays to help reduce pain. Gargling with warm salt water will also help reduce throat pain. Dissolve 1/2 teaspoon of salt in 1 glass of warm water. Children can sip on juice or a popsicle. Children 5 years and older can also suck on a lollipop or hard candy.    Don't eat salty or spicy foods or give them to your child. These can irritate the throat.  Other  medicine for a child: You can give your child acetaminophen for fever, fussiness, or discomfort. In babies over 6 months of age, you may use ibuprofen instead of acetaminophen. If your child has chronic liver or kidney disease or ever had a stomach ulcer or GI bleeding, talk with your child s healthcare provider before giving these medicines. Aspirin should never be used by any child under 18 years of age who has a fever. It may cause severe liver damage.  Other medicine for an adult: You may use acetaminophen or ibuprofen to control pain or fever, unless another medicine was prescribed for this. If you have chronic liver or kidney disease or ever had a stomach ulcer or GI bleeding, talk with your healthcare provider before using these medicines.  Follow-up care  Follow up with your healthcare provider or our staff if you or your child don't get better over the next week.  When to seek medical advice  Call your healthcare provider right away if any of these occur:    Fever as directed by your healthcare provider. For children, seek care if:  ? Your child is of any age and has repeated fevers above 104 F (40 C).  ? Your child is younger than 2 years of age and has a fever of 100.4 F (38 C) for more than 1 day.  ? Your child is 2 years old or older and has a fever of 100.4 F (38 C) for more than 3 days.    New or worsening ear pain, sinus pain, or headache    Painful lumps in the back of neck    Stiff neck    Lymph nodes are getting larger     Can t swallow liquids, a lot of drooling, or can t open mouth wide due to throat pain    Signs of dehydration, such as very dark urine or no urine, sunken eyes, dizziness    Trouble breathing or noisy breathing    Muffled voice    New rash    Other symptoms getting worse  Prevention  Here are steps you can take to help prevent an infection:    Keep good hand washing habits.    Don t have close contact with people who have sore throats, colds, or other upper respiratory  infections.    Don t smoke, and stay away from secondhand smoke.    Stay up to date with of your vaccines.  Date Last Reviewed: 11/1/2017 2000-2019 [employer]. [address] [place] [address], [place] [address]. All rights reserved. This information is not intended as a substitute for professional medical care. Always follow your healthcare professional's instructions.            MEDICATIONS  (PRN):  hydrOXYzine hydrochloride 50 milliGRAM(s) Oral every 6 hours PRN Anxiety  ibuprofen  Tablet. 400 milliGRAM(s) Oral every 6 hours PRN Moderate Pain (4 - 6)  polyethylene glycol 3350 17 Gram(s) Oral at bedtime PRN constipation  traZODone 50 milliGRAM(s) Oral at bedtime PRN insomnia

## 2022-08-10 LAB — SARS-COV-2 RNA SPEC QL NAA+PROBE: SIGNIFICANT CHANGE UP

## 2022-08-10 PROCEDURE — 90870 ELECTROCONVULSIVE THERAPY: CPT

## 2022-08-10 RX ORDER — VENLAFAXINE HCL 75 MG
112.5 CAPSULE, EXT RELEASE 24 HR ORAL DAILY
Refills: 0 | Status: DISCONTINUED | OUTPATIENT
Start: 2022-08-11 | End: 2022-08-15

## 2022-08-10 RX ORDER — QUETIAPINE FUMARATE 200 MG/1
150 TABLET, FILM COATED ORAL AT BEDTIME
Refills: 0 | Status: DISCONTINUED | OUTPATIENT
Start: 2022-08-10 | End: 2022-08-15

## 2022-08-10 RX ADMIN — Medication 400 MILLIGRAM(S): at 10:42

## 2022-08-10 RX ADMIN — QUETIAPINE FUMARATE 150 MILLIGRAM(S): 200 TABLET, FILM COATED ORAL at 21:24

## 2022-08-10 RX ADMIN — Medication 187.5 MILLIGRAM(S): at 10:40

## 2022-08-10 RX ADMIN — Medication 400 MILLIGRAM(S): at 11:40

## 2022-08-10 RX ADMIN — Medication 0.5 MILLIGRAM(S): at 16:58

## 2022-08-10 NOTE — BH INPATIENT PSYCHIATRY PROGRESS NOTE - NSBHCHARTREVIEWVS_PSY_A_CORE FT
Vital Signs Last 24 Hrs  T(C): 37.1 (08-10-22 @ 08:45), Max: 37.2 (08-09-22 @ 16:47)  T(F): 98.7 (08-10-22 @ 08:45), Max: 99 (08-09-22 @ 16:47)  HR: 103 (08-10-22 @ 09:00) (101 - 114)  BP: 145/84 (08-10-22 @ 09:00) (125/82 - 162/90)  BP(mean): 104 (08-10-22 @ 09:00) (104 - 121)  RR: 19 (08-10-22 @ 09:00) (18 - 20)  SpO2: 96% (08-10-22 @ 09:00) (95% - 98%)     Vital Signs Last 24 Hrs  T(C): 37.1 (08-10-22 @ 19:58), Max: 37.2 (08-10-22 @ 06:09)  T(F): 98.7 (08-10-22 @ 19:58), Max: 99 (08-10-22 @ 06:09)  HR: 112 (08-10-22 @ 19:58) (103 - 114)  BP: 139/94 (08-10-22 @ 19:58) (125/82 - 162/90)  BP(mean): 104 (08-10-22 @ 09:00) (104 - 121)  RR: 18 (08-10-22 @ 19:58) (18 - 20)  SpO2: 96% (08-10-22 @ 19:58) (95% - 97%)

## 2022-08-10 NOTE — BH INPATIENT PSYCHIATRY PROGRESS NOTE - NSBHMETABOLIC_PSY_ALL_CORE_FT
BMI: BMI (kg/m2): 21.8 (07-31-22 @ 17:40)  HbA1c: A1C with Estimated Average Glucose Result: 5.4 % (08-04-22 @ 07:23)    Glucose:   BP: 145/84 (08-10-22 @ 09:00) (117/79 - 162/90)  Lipid Panel: Date/Time: 08-04-22 @ 07:23  Cholesterol, Serum: 218  Direct LDL: --  HDL Cholesterol, Serum: 86  Total Cholesterol/HDL Ration Measurement: --  Triglycerides, Serum: 91   BMI: BMI (kg/m2): 21.8 (07-31-22 @ 17:40)  HbA1c: A1C with Estimated Average Glucose Result: 5.4 % (08-04-22 @ 07:23)    Glucose:   BP: 139/94 (08-10-22 @ 19:58) (117/79 - 162/90)  Lipid Panel: Date/Time: 08-04-22 @ 07:23  Cholesterol, Serum: 218  Direct LDL: --  HDL Cholesterol, Serum: 86  Total Cholesterol/HDL Ration Measurement: --  Triglycerides, Serum: 91

## 2022-08-10 NOTE — BH INPATIENT PSYCHIATRY PROGRESS NOTE - PRN MEDS
MEDICATIONS  (PRN):  hydrOXYzine hydrochloride 50 milliGRAM(s) Oral every 6 hours PRN Anxiety  ibuprofen  Tablet. 400 milliGRAM(s) Oral every 6 hours PRN Moderate Pain (4 - 6)  polyethylene glycol 3350 17 Gram(s) Oral at bedtime PRN constipation  traZODone 50 milliGRAM(s) Oral at bedtime PRN insomnia   MEDICATIONS  (PRN):  ibuprofen  Tablet. 400 milliGRAM(s) Oral every 6 hours PRN Moderate Pain (4 - 6)  LORazepam     Tablet 0.5 milliGRAM(s) Oral three times a day PRN Anxiety  polyethylene glycol 3350 17 Gram(s) Oral at bedtime PRN constipation  traZODone 50 milliGRAM(s) Oral at bedtime PRN insomnia

## 2022-08-10 NOTE — BH INPATIENT PSYCHIATRY PROGRESS NOTE - NSBHFUPINTERVALHXFT_PSY_A_CORE
Patient was interviewed today for follow up. She had another session of ECT (bilateral today) with no acute events. She complained of feeling as if she were choking after the procedure, but was reassured that everything is fine. She is very anxious and constantly asks for reassurance. She wants to take Ativan again because of anxiety, as she believes the Atarax is not helping her. She said she did not have nay difficulty falling asleep last night, but had nightmares and wake up early today. the patient did not want to disclose the content of her nightmare.

## 2022-08-10 NOTE — BH INPATIENT PSYCHIATRY PROGRESS NOTE - CURRENT MEDICATION
MEDICATIONS  (STANDING):  QUEtiapine 150 milliGRAM(s) Oral at bedtime    MEDICATIONS  (PRN):  hydrOXYzine hydrochloride 50 milliGRAM(s) Oral every 6 hours PRN Anxiety  ibuprofen  Tablet. 400 milliGRAM(s) Oral every 6 hours PRN Moderate Pain (4 - 6)  polyethylene glycol 3350 17 Gram(s) Oral at bedtime PRN constipation  traZODone 50 milliGRAM(s) Oral at bedtime PRN insomnia   MEDICATIONS  (STANDING):  QUEtiapine 150 milliGRAM(s) Oral at bedtime    MEDICATIONS  (PRN):  ibuprofen  Tablet. 400 milliGRAM(s) Oral every 6 hours PRN Moderate Pain (4 - 6)  LORazepam     Tablet 0.5 milliGRAM(s) Oral three times a day PRN Anxiety  polyethylene glycol 3350 17 Gram(s) Oral at bedtime PRN constipation  traZODone 50 milliGRAM(s) Oral at bedtime PRN insomnia

## 2022-08-10 NOTE — BH INPATIENT PSYCHIATRY PROGRESS NOTE - NSBHASSESSSUMMFT_PSY_ALL_CORE
This is a 66-year-old domiciled female with PPHx of depression, self-reported narcissistic and borderline personality disorders, 1PPH in 2012 for depression and received 8 unilateral ECT treatments per patient, no pSA/NSSIB, BIBS for worsening depressive symptoms since a mitral valve relapse surgery in 3/2022 and patient feeling overwhelmed/more anxious having had no prior medical ailments and then requiring a heart surgery this past March. Patient stable and has routine f/u and TTE in the fall of this year, no other PMH. No substance use history.     Patient recently admitted and receive 2 ECT treatments with good effect but decided to leave as she felt better but two days after discharge on 7/30 she awoke feeling depressed again and decided to return to the hospital for more ECT.      8/1: reviewed past recommendations and suggested that Abilify should be discontinued and question efficacy of ECT.  Will review with TEAM and ECT value of continuing ECT vs med management.   8/3: well groomed, anxious, logical, ativan-focused, elevated BP to 170s, compliant to starting Seroquel, medicine team consulted for ECT clearance and high BP, Seroquel started 50mg qhs   ;;08/04: patient will try Seroquel tonight; being cleared for ECT but concerns about high bp but Effexor being lowered to 187.5mg daily.  Abilify has been discontinued.    - 08/08: patient had ECT this morning. No acute events observed. She felt tired after the session. Still endorsing depressed mood and anxiety. Will continue to adjust medications.    - 08/09: patient still very anxious, but reports no headache or dizziness. No acute events. Complains of anhedonia and abulia. Will have another session of ECT tomorrow. Agreed to switch Ativan to Atarax to decrease interaction with ECT.    - 08/10: patient had another session of ECT (the second, bilateral today). It was uneventful. However, she is still very anxious. Medications changes are being made: will decrease Effexor to 112.5mg today and increase Seroquel to 150 mg. Will give Ativan up until 12 hours prior to ECT, as the patient is very anxious.

## 2022-08-11 PROCEDURE — 96127 BRIEF EMOTIONAL/BEHAV ASSMT: CPT

## 2022-08-11 PROCEDURE — 99232 SBSQ HOSP IP/OBS MODERATE 35: CPT

## 2022-08-11 RX ORDER — QUETIAPINE FUMARATE 200 MG/1
25 TABLET, FILM COATED ORAL THREE TIMES A DAY
Refills: 0 | Status: DISCONTINUED | OUTPATIENT
Start: 2022-08-11 | End: 2022-08-17

## 2022-08-11 RX ADMIN — Medication 112.5 MILLIGRAM(S): at 09:49

## 2022-08-11 RX ADMIN — Medication 50 MILLIGRAM(S): at 21:30

## 2022-08-11 RX ADMIN — QUETIAPINE FUMARATE 25 MILLIGRAM(S): 200 TABLET, FILM COATED ORAL at 11:50

## 2022-08-11 RX ADMIN — Medication 400 MILLIGRAM(S): at 13:00

## 2022-08-11 RX ADMIN — QUETIAPINE FUMARATE 150 MILLIGRAM(S): 200 TABLET, FILM COATED ORAL at 21:31

## 2022-08-11 RX ADMIN — QUETIAPINE FUMARATE 25 MILLIGRAM(S): 200 TABLET, FILM COATED ORAL at 17:15

## 2022-08-11 RX ADMIN — Medication 400 MILLIGRAM(S): at 10:09

## 2022-08-11 NOTE — BH PSYCHOLOGY ASSESSMENT - NSBHPSYCHOLASBEHAV_PSY_A_CORE FT
APPEARANCE: dressed well, uncombed hair    EYE CONTACT: fair    MOVEMENT: no abnormalities noted    SPEECH: wnl    THOUGHT PROCESSING: goal oriented    ORIENTATION: x3    THOUGHT CONTENT: no avh reported    MOOD: anxious     SUICIDAL/ HOMICIDAL IDEATION: denied    AFFECT: constricted    COOPERATION: cooperative but requires a lot of encouragement. 
APPEARANCE: appropriately groomed, wearing her own clothing    SENSORY PERCEPTION: WNL    EYE CONTACT: WNL    MOVEMENT: somewhat slow    SPEECH: quiet, flat rhythm, normal rate and amount    THOUGHT PROCESSING: WNL    ORIENTATION: AAOx4    THOUGHT CONTENT: WNL, motivated for ECT    MOOD: depressed    SUICIDAL/ HOMICIDAL IDEATION: Pt endorsed active SI, denied HI    AFFECT: congruent, blunted    COOPERATION: well-related and cooperative

## 2022-08-11 NOTE — BH INPATIENT PSYCHIATRY PROGRESS NOTE - PRN MEDS
MEDICATIONS  (PRN):  ibuprofen  Tablet. 400 milliGRAM(s) Oral every 6 hours PRN Moderate Pain (4 - 6)  polyethylene glycol 3350 17 Gram(s) Oral at bedtime PRN constipation  QUEtiapine 25 milliGRAM(s) Oral three times a day PRN anxiety and mood  traZODone 50 milliGRAM(s) Oral at bedtime PRN insomnia

## 2022-08-11 NOTE — BH PSYCHOLOGY ASSESSMENT - NSBHPSYCHOLSUMMARY_PSY_A_CORE FT
Ms. Friedman completed MOCA and scored 22/30.    In BDI she scored: Ms. Friedman completed MOCA and scored 22/30.    In BDI she scored: 42 out of 63, which places her in severe range

## 2022-08-11 NOTE — BH INPATIENT PSYCHIATRY PROGRESS NOTE - NSBHCHARTREVIEWVS_PSY_A_CORE FT
Vital Signs Last 24 Hrs  T(C): 37.1 (08-11-22 @ 16:54), Max: 37.3 (08-11-22 @ 09:00)  T(F): 98.8 (08-11-22 @ 16:54), Max: 99.1 (08-11-22 @ 09:00)  HR: 98 (08-11-22 @ 16:54) (98 - 112)  BP: 145/85 (08-11-22 @ 16:54) (118/75 - 145/85)  BP(mean): --  RR: 18 (08-11-22 @ 16:54) (18 - 18)  SpO2: 99% (08-11-22 @ 16:54) (95% - 99%)

## 2022-08-11 NOTE — BH PSYCHOLOGY ASSESSMENT - NSBHPSYCHOLASREASON_PSY_A_CORE FT
Ms. Friedman was administered MOCA and BDI as part of her ECT treatment monitoring. 
Ms. Friedman is a 66-year-old, White, cisgender woman re-admitted to inpatient psychiatric unit in [facility] days after previous psych hospitalization on the unit. Ms. Friedman presents with severe depression, difficulty concentrating, agitation, and suicidality. She is domiciled with her  who she reports is her main support. Ms. Friedman has been recommended by [facility] treatment team for continued ECT, and she is motivated to continue ECT and hopes for reduction in depressive sx. [place] Cognitive Assessment (MOCA) and [place] Depression Inventory (BDI) administered to Ms. Friedman in her room by psychology intern. Ms. Friedman's was initially apprehensive to cognitive testing, reporting that she "sat for a good hour yesterday" completing a cognitive test with another psychology trainee and that she found it "very difficult" for her. With the understanding that the testing's purpose is related to ECT and monitoring her depressive sx and cognitive functioning, she agreed to testing. Ms. Friedman was cooperative, engaged, though negativistic regarding her outcomes on the tests (e.g., during BDI, commenting on her high level of agitation and her decreased concentration; stating that parts of the MOCA were difficult for her). Ms. Friedman scored 52/63 on the BDI, indicating extreme depression. It is notable that Ms. Friedman does not appear as depressed (e.g., she is engaging, alert, motivated for treatment) as her report on the BDI, and therefore provider observation alone may not be sufficient for assessment. Ms. Friedman scored 21/30 on the MOCA, indicating mild cognitive impairment which may be secondary to her depression. Ms. Friedman had particular difficult with immediate and delayed recall of words. Testing of orientation required some self-correction; when asked what the date was, Ms. Friedman said "August 6th, no 5th" and only when being asked the day of the week, stated the correct date "the 4th." Initially reported year "2002," when prompted to try again, correctly answered.

## 2022-08-11 NOTE — BH INPATIENT PSYCHIATRY PROGRESS NOTE - NSBHASSESSSUMMFT_PSY_ALL_CORE
This is a 66-year-old domiciled female with PPHx of depression, self-reported narcissistic and borderline personality disorders, 1PPH in 2012 for depression and received 8 unilateral ECT treatments per patient, no pSA/NSSIB, BIBS for worsening depressive symptoms since a mitral valve relapse surgery in 3/2022 and patient feeling overwhelmed/more anxious having had no prior medical ailments and then requiring a heart surgery this past March. Patient stable and has routine f/u and TTE in the fall of this year, no other PMH. No substance use history.     Patient recently admitted and receive 2 ECT treatments with good effect but decided to leave as she felt better but two days after discharge on 7/30 she awoke feeling depressed again and decided to return to the hospital for more ECT.      8/1: reviewed past recommendations and suggested that Abilify should be discontinued and question efficacy of ECT.  Will review with TEAM and ECT value of continuing ECT vs med management.   8/3: well groomed, anxious, logical, ativan-focused, elevated BP to 170s, compliant to starting Seroquel, medicine team consulted for ECT clearance and high BP, Seroquel started 50mg qhs   ;;08/04: patient will try Seroquel tonight; being cleared for ECT but concerns about high bp but Effexor being lowered to 187.5mg daily.  Abilify has been discontinued.    - 08/08: patient had ECT this morning. No acute events observed. She felt tired after the session. Still endorsing depressed mood and anxiety. Will continue to adjust medications.    - 08/09: patient still very anxious, but reports no headache or dizziness. No acute events. Complains of anhedonia and abulia. Will have another session of ECT tomorrow. Agreed to switch Ativan to Atarax to decrease interaction with ECT.    - 08/10: patient had another session of ECT (the second, bilateral today). It was uneventful. However, she is still very anxious. Medications changes are being made: will decrease Effexor to 112.5mg today and increase Seroquel to 150 mg. Will give Ativan up until 12 hours prior to ECT, as the patient is very anxious.  ;;08/11: anxious but a little less depressed after ECT #2 (BDI improved 10 points  MMS unchanged); accepting Seroquel for anxiety prn.  ECT #3 in am

## 2022-08-11 NOTE — BH INPATIENT PSYCHIATRY PROGRESS NOTE - CURRENT MEDICATION
MEDICATIONS  (STANDING):  QUEtiapine 150 milliGRAM(s) Oral at bedtime  venlafaxine .5 milliGRAM(s) Oral daily    MEDICATIONS  (PRN):  ibuprofen  Tablet. 400 milliGRAM(s) Oral every 6 hours PRN Moderate Pain (4 - 6)  polyethylene glycol 3350 17 Gram(s) Oral at bedtime PRN constipation  QUEtiapine 25 milliGRAM(s) Oral three times a day PRN anxiety and mood  traZODone 50 milliGRAM(s) Oral at bedtime PRN insomnia

## 2022-08-11 NOTE — BH INPATIENT PSYCHIATRY PROGRESS NOTE - NSBHFUPINTERVALHXFT_PSY_A_CORE
anxious but a little less depressed after ECT #2 (BDI improved 10 points  MMS unchanged); accepting Seroquel for anxiety prn.  ECT #3 in am  Not endorsing  suicidal or homicidal ideation intent or plans; no mention of auditory or visual hallucinations Alert; oriented; cognition intact; speech clear; no tremor or evidence of movement impairment.

## 2022-08-11 NOTE — BH INPATIENT PSYCHIATRY PROGRESS NOTE - NSBHMETABOLIC_PSY_ALL_CORE_FT
BMI: BMI (kg/m2): 21.8 (07-31-22 @ 17:40)  HbA1c: A1C with Estimated Average Glucose Result: 5.4 % (08-04-22 @ 07:23)    Glucose:   BP: 145/85 (08-11-22 @ 16:54) (118/75 - 162/90)  Lipid Panel: Date/Time: 08-04-22 @ 07:23  Cholesterol, Serum: 218  Direct LDL: --  HDL Cholesterol, Serum: 86  Total Cholesterol/HDL Ration Measurement: --  Triglycerides, Serum: 91

## 2022-08-12 PROCEDURE — 90870 ELECTROCONVULSIVE THERAPY: CPT

## 2022-08-12 PROCEDURE — 99232 SBSQ HOSP IP/OBS MODERATE 35: CPT

## 2022-08-12 RX ADMIN — Medication 400 MILLIGRAM(S): at 13:01

## 2022-08-12 RX ADMIN — Medication 50 MILLIGRAM(S): at 21:17

## 2022-08-12 RX ADMIN — QUETIAPINE FUMARATE 25 MILLIGRAM(S): 200 TABLET, FILM COATED ORAL at 15:44

## 2022-08-12 RX ADMIN — QUETIAPINE FUMARATE 150 MILLIGRAM(S): 200 TABLET, FILM COATED ORAL at 21:15

## 2022-08-12 RX ADMIN — Medication 112.5 MILLIGRAM(S): at 10:46

## 2022-08-12 RX ADMIN — Medication 400 MILLIGRAM(S): at 13:30

## 2022-08-12 NOTE — BH INPATIENT PSYCHIATRY PROGRESS NOTE - NSBHASSESSSUMMFT_PSY_ALL_CORE
This is a 66-year-old domiciled female with PPHx of depression, self-reported narcissistic and borderline personality disorders, 1PPH in 2012 for depression and received 8 unilateral ECT treatments per patient, no pSA/NSSIB, BIBS for worsening depressive symptoms since a mitral valve relapse surgery in 3/2022 and patient feeling overwhelmed/more anxious having had no prior medical ailments and then requiring a heart surgery this past March. Patient stable and has routine f/u and TTE in the fall of this year, no other PMH. No substance use history.     Patient recently admitted and receive 2 ECT treatments with good effect but decided to leave as she felt better but two days after discharge on 7/30 she awoke feeling depressed again and decided to return to the hospital for more ECT.      8/1: reviewed past recommendations and suggested that Abilify should be discontinued and question efficacy of ECT.  Will review with TEAM and ECT value of continuing ECT vs med management.   8/3: well groomed, anxious, logical, ativan-focused, elevated BP to 170s, compliant to starting Seroquel, medicine team consulted for ECT clearance and high BP, Seroquel started 50mg qhs   ;;08/04: patient will try Seroquel tonight; being cleared for ECT but concerns about high bp but Effexor being lowered to 187.5mg daily.  Abilify has been discontinued.    - 08/08: patient had ECT this morning. No acute events observed. She felt tired after the session. Still endorsing depressed mood and anxiety. Will continue to adjust medications.    - 08/09: patient still very anxious, but reports no headache or dizziness. No acute events. Complains of anhedonia and abulia. Will have another session of ECT tomorrow. Agreed to switch Ativan to Atarax to decrease interaction with ECT.    - 08/10: patient had another session of ECT (the second, bilateral today). It was uneventful. However, she is still very anxious. Medications changes are being made: will decrease Effexor to 112.5mg today and increase Seroquel to 150 mg. Will give Ativan up until 12 hours prior to ECT, as the patient is very anxious.  ;;08/11: anxious but a little less depressed after ECT #2 (BDI improved 10 points  MMS unchanged); accepting Seroquel for anxiety prn.  ECT #3 in am This is a 66-year-old domiciled female with PPHx of depression, self-reported narcissistic and borderline personality disorders, 1PPH in 2012 for depression and received 8 unilateral ECT treatments per patient, no pSA/NSSIB, BIBS for worsening depressive symptoms since a mitral valve relapse surgery in 3/2022 and patient feeling overwhelmed/more anxious having had no prior medical ailments and then requiring a heart surgery this past March. Patient stable and has routine f/u and TTE in the fall of this year, no other PMH. No substance use history.     Patient recently admitted and receive 2 ECT treatments with good effect but decided to leave as she felt better but two days after discharge on 7/30 she awoke feeling depressed again and decided to return to the hospital for more ECT.      8/1: reviewed past recommendations and suggested that Abilify should be discontinued and question efficacy of ECT.  Will review with TEAM and ECT value of continuing ECT vs med management.   8/3: well groomed, anxious, logical, ativan-focused, elevated BP to 170s, compliant to starting Seroquel, medicine team consulted for ECT clearance and high BP, Seroquel started 50mg qhs   ;;08/04: patient will try Seroquel tonight; being cleared for ECT but concerns about high bp but Effexor being lowered to 187.5mg daily.  Abilify has been discontinued.    - 08/08: patient had ECT this morning. No acute events observed. She felt tired after the session. Still endorsing depressed mood and anxiety. Will continue to adjust medications.    - 08/09: patient still very anxious, but reports no headache or dizziness. No acute events. Complains of anhedonia and abulia. Will have another session of ECT tomorrow. Agreed to switch Ativan to Atarax to decrease interaction with ECT.    - 08/10: patient had another session of ECT (the second, bilateral today). It was uneventful. However, she is still very anxious. Medications changes are being made: will decrease Effexor to 112.5mg today and increase Seroquel to 150 mg. Will give Ativan up until 12 hours prior to ECT, as the patient is very anxious.    ;;08/11: anxious but a little less depressed after ECT #2 (BDI improved 10 points  MMS unchanged); accepting Seroquel for anxiety prn.  ECT #3 in am    -08/12: patient is less anxious today, but still asks for reassurance about her treatment and feels insecure. She took Seroquel PRN yesterday and felt sedated. Today she is not demanding PRNs as before. Will continue to monitor response to ECT.

## 2022-08-12 NOTE — BH PSYCHOLOGY - CLINICIAN PSYCHOTHERAPY NOTE - NSBHPSYCHOLNARRATIVE_PSY_A_CORE FT
Balbina is a 66-year-old domiciled,  female who had been  with her  but has recently, since the past 2 weeks, been together with him again. She reported that he had moved out of the apartment when they were  but has since moved back into the apartment that is now shared between them. She presents with a history of depression and self-reported previously diagnosed personality disorders, who self-presented with worsening depressive symptoms following heart surgery in March though without other clear precipitating factors. She was recently discharged from [facility], and then was voluntarily readmitted given worsening depressive symptoms. She has stated desiring continued ECT sessions.     SESSION CONTENT: Balbina was administered the WTAR, which will be provided in a testing report. Subsequently after the administration, she was able to engage in answering questions and discussed more of her experience. Balbina reported that she was feeling tired from the ECT she had gotten this morning, and had a headache. She expressed that she has been feeling anxiety about whether the ECT will work for her and the urgency with which she would like to leave [facility] but the understanding that she needs to go through the full treatment. She appeared resistant in talking about certain topics, such as the past, that may bring up heavier feelings. The therapist validated and normalized these feelings while supporting treatment compliance and sticking with the full ECT process. Therapist and patient discussed memories, feelings of loss and coping mechanisms that may be useful both within the unit and upon discharge, such as writing, movement therapy group, music group, and reading. She stated that she will know she feels better once she wants to start reading again. Balbina reported that she would be open to twice weekly psychotherapy upon discharge, and psychoeducation was provided regarding how this treatment might help her in the future and maintain depressive cognitions at bay while allowing her to further process negative emotions. She reported that she would like to find affordable therapy, although she currently has a therapist who is on vacation and whom she stated feels that Balbina may require more clinical care. She ended the session in good emotional and behavioral control.   BEHAVIORAL OBSERVATIONS: Balbina was observed to be tired and more anxious, which she reported as well.   RISK FACTORS:                          Static - Hx of depressive episodes, Hx of heart surgery                          Modifiable - improved mood, response to ECT                          Protective - Stable housing, outpatient care, friends and social support,   APPEARANCE:  [x] adequately groomed [] disheveled [] malodorous [] Other:   BEHAVIOR: [X] cooperative [] uncooperative [x] good EC [] poor EC [x] well related [] oddly related [x] guarded []PMA [] PMR []abnormal movements [] Other:   SPEED: [x] normal rate/rhythm/volume [] loud [] quiet [] slow [] rapid [x] pressured [] Other: _________   MOOD: [] euthymic [] dysphoric [x] anxious [] irritable [] Other: ___________   AFFECT: [] full [] expansive [x] constricted [] blunted [] flat [x] stable [] labile [] Other: ________________ THOUGHT PROCESS: [X] organized [] disorganized [x] goal-directed [x] concrete [x] logical [] illogical   [] circumstantial [] tangential [] impoverished [] effusive [] repetitive [] Other:  THOUGHT CONTENT: [] negative for delusions/suicidal ideation /homicidal ideation [] positive for delusions/suicidal ideation/homicidal ideation [x] Unable to assess Describe:   PERCEPTION: [X] negative for auditory/ visual hallucinations [] positive for auditory/ visual hallucinations [] Unable to assess Describe: ________________________________________________________   INSIGHT/JUDGMENT: [] good [x]fair [] poor        IMPULSE CONTROL: [x] good []fair [] poor     COGNITION: [x] alert and oriented to person, time, place [] Lacks orientation to person/time/place. Describe: ___________________________

## 2022-08-12 NOTE — BH INPATIENT PSYCHIATRY PROGRESS NOTE - NSBHFUPINTERVALHXFT_PSY_A_CORE
The patient was interviewed today for follow up. She had her 3rd session of ECT today. She was feeling tired this morning, after the session, which was uneventful. However, she reported once again a sensation of choking right after the procedure, but was feeling fine and denied it by the time of the evaluation. She said she did not feel the need for Ativan at the moment of the interview. She asked for reassurance about her treatment but less than she did the previous days.

## 2022-08-12 NOTE — BH INPATIENT PSYCHIATRY PROGRESS NOTE - NSBHCHARTREVIEWVS_PSY_A_CORE FT
Vital Signs Last 24 Hrs  T(C): 36.6 (08-12-22 @ 09:20), Max: 37.3 (08-12-22 @ 06:28)  T(F): 97.9 (08-12-22 @ 09:20), Max: 99.1 (08-12-22 @ 06:28)  HR: 96 (08-12-22 @ 09:20) (95 - 113)  BP: 130/87 (08-12-22 @ 09:20) (113/76 - 166/86)  BP(mean): 99 (08-12-22 @ 09:03) (97 - 119)  RR: 18 (08-12-22 @ 09:20) (9 - 32)  SpO2: 97% (08-12-22 @ 09:20) (95% - 99%)

## 2022-08-12 NOTE — BH INPATIENT PSYCHIATRY PROGRESS NOTE - NSBHMETABOLIC_PSY_ALL_CORE_FT
BMI: BMI (kg/m2): 21.8 (07-31-22 @ 17:40)  HbA1c: A1C with Estimated Average Glucose Result: 5.4 % (08-04-22 @ 07:23)    Glucose:   BP: 130/87 (08-12-22 @ 09:20) (113/76 - 166/86)  Lipid Panel: Date/Time: 08-04-22 @ 07:23  Cholesterol, Serum: 218  Direct LDL: --  HDL Cholesterol, Serum: 86  Total Cholesterol/HDL Ration Measurement: --  Triglycerides, Serum: 91

## 2022-08-13 RX ADMIN — QUETIAPINE FUMARATE 25 MILLIGRAM(S): 200 TABLET, FILM COATED ORAL at 16:34

## 2022-08-13 RX ADMIN — QUETIAPINE FUMARATE 25 MILLIGRAM(S): 200 TABLET, FILM COATED ORAL at 10:33

## 2022-08-13 RX ADMIN — Medication 50 MILLIGRAM(S): at 21:05

## 2022-08-13 RX ADMIN — Medication 112.5 MILLIGRAM(S): at 10:34

## 2022-08-13 RX ADMIN — QUETIAPINE FUMARATE 150 MILLIGRAM(S): 200 TABLET, FILM COATED ORAL at 21:05

## 2022-08-14 RX ADMIN — Medication 400 MILLIGRAM(S): at 17:30

## 2022-08-14 RX ADMIN — QUETIAPINE FUMARATE 150 MILLIGRAM(S): 200 TABLET, FILM COATED ORAL at 22:35

## 2022-08-14 RX ADMIN — Medication 112.5 MILLIGRAM(S): at 10:30

## 2022-08-14 RX ADMIN — QUETIAPINE FUMARATE 25 MILLIGRAM(S): 200 TABLET, FILM COATED ORAL at 10:58

## 2022-08-14 RX ADMIN — QUETIAPINE FUMARATE 25 MILLIGRAM(S): 200 TABLET, FILM COATED ORAL at 19:03

## 2022-08-14 RX ADMIN — Medication 50 MILLIGRAM(S): at 22:37

## 2022-08-15 PROCEDURE — 99232 SBSQ HOSP IP/OBS MODERATE 35: CPT | Mod: 25

## 2022-08-15 PROCEDURE — 90870 ELECTROCONVULSIVE THERAPY: CPT

## 2022-08-15 RX ORDER — QUETIAPINE FUMARATE 200 MG/1
200 TABLET, FILM COATED ORAL AT BEDTIME
Refills: 0 | Status: DISCONTINUED | OUTPATIENT
Start: 2022-08-15 | End: 2022-08-17

## 2022-08-15 RX ORDER — VENLAFAXINE HCL 75 MG
75 CAPSULE, EXT RELEASE 24 HR ORAL DAILY
Refills: 0 | Status: DISCONTINUED | OUTPATIENT
Start: 2022-08-15 | End: 2022-08-15

## 2022-08-15 RX ORDER — VENLAFAXINE HCL 75 MG
75 CAPSULE, EXT RELEASE 24 HR ORAL DAILY
Refills: 0 | Status: DISCONTINUED | OUTPATIENT
Start: 2022-08-16 | End: 2022-08-17

## 2022-08-15 RX ADMIN — Medication 112.5 MILLIGRAM(S): at 10:24

## 2022-08-15 RX ADMIN — Medication 50 MILLIGRAM(S): at 21:43

## 2022-08-15 RX ADMIN — QUETIAPINE FUMARATE 25 MILLIGRAM(S): 200 TABLET, FILM COATED ORAL at 10:24

## 2022-08-15 RX ADMIN — QUETIAPINE FUMARATE 25 MILLIGRAM(S): 200 TABLET, FILM COATED ORAL at 18:26

## 2022-08-15 RX ADMIN — QUETIAPINE FUMARATE 200 MILLIGRAM(S): 200 TABLET, FILM COATED ORAL at 21:41

## 2022-08-15 NOTE — BH INPATIENT PSYCHIATRY PROGRESS NOTE - NSBHCHARTREVIEWVS_PSY_A_CORE FT
Vital Signs Last 24 Hrs  T(C): 37 (08-15-22 @ 09:00), Max: 37 (08-15-22 @ 09:00)  T(F): 98.6 (08-15-22 @ 09:00), Max: 98.6 (08-15-22 @ 09:00)  HR: 103 (08-15-22 @ 09:00) (87 - 109)  BP: 134/81 (08-15-22 @ 09:00) (108/71 - 146/83)  BP(mean): 105 (08-15-22 @ 08:45) (94 - 109)  RR: 16 (08-15-22 @ 09:00) (12 - 80)  SpO2: 95% (08-15-22 @ 09:00) (94% - 98%)

## 2022-08-15 NOTE — BH INPATIENT PSYCHIATRY PROGRESS NOTE - NSBHASSESSSUMMFT_PSY_ALL_CORE
This is a 66-year-old domiciled female with PPHx of depression, self-reported narcissistic and borderline personality disorders, 1PPH in 2012 for depression and received 8 unilateral ECT treatments per patient, no pSA/NSSIB, BIBS for worsening depressive symptoms since a mitral valve relapse surgery in 3/2022 and patient feeling overwhelmed/more anxious having had no prior medical ailments and then requiring a heart surgery this past March. Patient stable and has routine f/u and TTE in the fall of this year, no other PMH. No substance use history.     Patient recently admitted and receive 2 ECT treatments with good effect but decided to leave as she felt better but two days after discharge on 7/30 she awoke feeling depressed again and decided to return to the hospital for more ECT.      8/1: reviewed past recommendations and suggested that Abilify should be discontinued and question efficacy of ECT.  Will review with TEAM and ECT value of continuing ECT vs med management.   8/3: well groomed, anxious, logical, ativan-focused, elevated BP to 170s, compliant to starting Seroquel, medicine team consulted for ECT clearance and high BP, Seroquel started 50mg qhs   ;;08/04: patient will try Seroquel tonight; being cleared for ECT but concerns about high bp but Effexor being lowered to 187.5mg daily.  Abilify has been discontinued.    - 08/08: patient had ECT this morning. No acute events observed. She felt tired after the session. Still endorsing depressed mood and anxiety. Will continue to adjust medications.    - 08/09: patient still very anxious, but reports no headache or dizziness. No acute events. Complains of anhedonia and abulia. Will have another session of ECT tomorrow. Agreed to switch Ativan to Atarax to decrease interaction with ECT.    - 08/10: patient had another session of ECT (the second, bilateral today). It was uneventful. However, she is still very anxious. Medications changes are being made: will decrease Effexor to 112.5mg today and increase Seroquel to 150 mg. Will give Ativan up until 12 hours prior to ECT, as the patient is very anxious.    ;;08/11: anxious but a little less depressed after ECT #2 (BDI improved 10 points  MMS unchanged); accepting Seroquel for anxiety prn.  ECT #3 in am    -08/12: patient is less anxious today, but still asks for reassurance about her treatment and feels insecure. She took Seroquel PRN yesterday and felt sedated. Today she is not demanding PRNs as before. Will continue to monitor response to ECT.    -08/15: patient had a 4th session of ECT today. Anxiety is improving but she is still depressed. Accepting Seroquel PRN and is not demanding benzos. Will continue to monitor. This is a 66-year-old domiciled female with PPHx of depression, self-reported narcissistic and borderline personality disorders, 1PPH in 2012 for depression and received 8 unilateral ECT treatments per patient, no pSA/NSSIB, BIBS for worsening depressive symptoms since a mitral valve relapse surgery in 3/2022 and patient feeling overwhelmed/more anxious having had no prior medical ailments and then requiring a heart surgery this past March. Patient stable and has routine f/u and TTE in the fall of this year, no other PMH. No substance use history.     Patient recently admitted and receive 2 ECT treatments with good effect but decided to leave as she felt better but two days after discharge on 7/30 she awoke feeling depressed again and decided to return to the hospital for more ECT.      8/1: reviewed past recommendations and suggested that Abilify should be discontinued and question efficacy of ECT.  Will review with TEAM and ECT value of continuing ECT vs med management.   8/3: well groomed, anxious, logical, ativan-focused, elevated BP to 170s, compliant to starting Seroquel, medicine team consulted for ECT clearance and high BP, Seroquel started 50mg qhs   ;;08/04: patient will try Seroquel tonight; being cleared for ECT but concerns about high bp but Effexor being lowered to 187.5mg daily.  Abilify has been discontinued.    - 08/08: patient had ECT this morning. No acute events observed. She felt tired after the session. Still endorsing depressed mood and anxiety. Will continue to adjust medications.    - 08/09: patient still very anxious, but reports no headache or dizziness. No acute events. Complains of anhedonia and abulia. Will have another session of ECT tomorrow. Agreed to switch Ativan to Atarax to decrease interaction with ECT.    - 08/10: patient had another session of ECT (the second, bilateral today). It was uneventful. However, she is still very anxious. Medications changes are being made: will decrease Effexor to 112.5mg today and increase Seroquel to 150 mg. Will give Ativan up until 12 hours prior to ECT, as the patient is very anxious.    ;;08/11: anxious but a little less depressed after ECT #2 (BDI improved 10 points  MMS unchanged); accepting Seroquel for anxiety prn.  ECT #3 in am    -08/12: patient is less anxious today, but still asks for reassurance about her treatment and feels insecure. She took Seroquel PRN yesterday and felt sedated. Today she is not demanding PRNs as before. Will continue to monitor response to ECT.    -08/15: patient had a 4th session of ECT today. Anxiety is improving but she is still depressed. Accepting Seroquel PRN and is not demanding benzodiazepines. Will continue to monitor. This is a 66-year-old domiciled female with PPHx of depression, self-reported narcissistic and borderline personality disorders, 1PPH in 2012 for depression and received 8 unilateral ECT treatments per patient, no pSA/NSSIB, BIBS for worsening depressive symptoms since a mitral valve relapse surgery in 3/2022 and patient feeling overwhelmed/more anxious having had no prior medical ailments and then requiring a heart surgery this past March. Patient stable and has routine f/u and TTE in the fall of this year, no other PMH. No substance use history.     Patient recently admitted and receive 2 ECT treatments with good effect but decided to leave as she felt better but two days after discharge on 7/30 she awoke feeling depressed again and decided to return to the hospital for more ECT.      8/1: reviewed past recommendations and suggested that Abilify should be discontinued and question efficacy of ECT.  Will review with TEAM and ECT value of continuing ECT vs med management.   8/3: well groomed, anxious, logical, ativan-focused, elevated BP to 170s, compliant to starting Seroquel, medicine team consulted for ECT clearance and high BP, Seroquel started 50mg qhs   ;;08/04: patient will try Seroquel tonight; being cleared for ECT but concerns about high bp but Effexor being lowered to 187.5mg daily.  Abilify has been discontinued.    - 08/08: patient had ECT this morning. No acute events observed. She felt tired after the session. Still endorsing depressed mood and anxiety. Will continue to adjust medications.    - 08/09: patient still very anxious, but reports no headache or dizziness. No acute events. Complains of anhedonia and abulia. Will have another session of ECT tomorrow. Agreed to switch Ativan to Atarax to decrease interaction with ECT.    - 08/10: patient had another session of ECT (the second, bilateral today). It was uneventful. However, she is still very anxious. Medications changes are being made: will decrease Effexor to 112.5mg today and increase Seroquel to 150 mg. Will give Ativan up until 12 hours prior to ECT, as the patient is very anxious.    ;;08/11: anxious but a little less depressed after ECT #2 (BDI improved 10 points  MMS unchanged); accepting Seroquel for anxiety prn.  ECT #3 in am    -08/12: patient is less anxious today, but still asks for reassurance about her treatment and feels insecure. She took Seroquel PRN yesterday and felt sedated. Today she is not demanding PRNs as before. Will continue to monitor response to ECT.    -08/15: patient had a 4th session of ECT today. Anxiety is improving but she is still depressed. Accepting Seroquel PRN and is not demanding benzodiazepines. Will increase bedtime Seroquel to 200 mg (minimum dose for bipolar depression) and decrease Effexor to 75 mg (due to poor response to antidepressants). Will continue to monitor.

## 2022-08-15 NOTE — BH INPATIENT PSYCHIATRY PROGRESS NOTE - NSBHMETABOLIC_PSY_ALL_CORE_FT
BMI: BMI (kg/m2): 21.8 (07-31-22 @ 17:40)  HbA1c: A1C with Estimated Average Glucose Result: 5.4 % (08-04-22 @ 07:23)    Glucose:   BP: 134/81 (08-15-22 @ 09:00) (108/71 - 153/97)  Lipid Panel: Date/Time: 08-04-22 @ 07:23  Cholesterol, Serum: 218  Direct LDL: --  HDL Cholesterol, Serum: 86  Total Cholesterol/HDL Ration Measurement: --  Triglycerides, Serum: 91

## 2022-08-15 NOTE — BH INPATIENT PSYCHIATRY PROGRESS NOTE - NSBHFUPINTERVALHXFT_PSY_A_CORE
The patient was interviewed today for follow up. She had her 4th session of ECT today, which was uneventful. She did not report a sensation of choking this time, but complained of coughing after the ECT. However, this symptom had improved by the time of this assessment. She was less anxious today and did not ask for reassurance. She reported a good sleep last night. She says, however, that she still feels depressed and does not have her usual motivation to do things. She denies SI.

## 2022-08-15 NOTE — BH INPATIENT PSYCHIATRY PROGRESS NOTE - CURRENT MEDICATION
MEDICATIONS  (STANDING):  QUEtiapine 150 milliGRAM(s) Oral at bedtime  venlafaxine .5 milliGRAM(s) Oral daily    MEDICATIONS  (PRN):  ibuprofen  Tablet. 400 milliGRAM(s) Oral every 6 hours PRN Moderate Pain (4 - 6)  polyethylene glycol 3350 17 Gram(s) Oral at bedtime PRN constipation  QUEtiapine 25 milliGRAM(s) Oral three times a day PRN anxiety and mood  traZODone 50 milliGRAM(s) Oral at bedtime PRN insomnia   MEDICATIONS  (STANDING):  QUEtiapine 200 milliGRAM(s) Oral at bedtime  venlafaxine 75 milliGRAM(s) Oral daily    MEDICATIONS  (PRN):  ibuprofen  Tablet. 400 milliGRAM(s) Oral every 6 hours PRN Moderate Pain (4 - 6)  polyethylene glycol 3350 17 Gram(s) Oral at bedtime PRN constipation  QUEtiapine 25 milliGRAM(s) Oral three times a day PRN anxiety and mood  traZODone 50 milliGRAM(s) Oral at bedtime PRN insomnia

## 2022-08-16 PROCEDURE — 99232 SBSQ HOSP IP/OBS MODERATE 35: CPT

## 2022-08-16 RX ADMIN — Medication 50 MILLIGRAM(S): at 20:43

## 2022-08-16 RX ADMIN — QUETIAPINE FUMARATE 200 MILLIGRAM(S): 200 TABLET, FILM COATED ORAL at 20:43

## 2022-08-16 RX ADMIN — Medication 75 MILLIGRAM(S): at 09:52

## 2022-08-16 RX ADMIN — QUETIAPINE FUMARATE 25 MILLIGRAM(S): 200 TABLET, FILM COATED ORAL at 09:53

## 2022-08-16 RX ADMIN — QUETIAPINE FUMARATE 25 MILLIGRAM(S): 200 TABLET, FILM COATED ORAL at 18:05

## 2022-08-16 NOTE — BH INPATIENT PSYCHIATRY PROGRESS NOTE - NSCGIIMPROVESX_PSY_ALL_CORE
4 = No change - symptoms remain essentially unchanged

## 2022-08-16 NOTE — BH TREATMENT PLAN - NSTXDEPRESINTERMD_PSY_ALL_CORE
Continues on Effexor XR and Seeroquel with ECT for mood; 15min/day   Continues on Effexor XR and Seroquel with ECT for mood; 15min/day , Julissa VALENTIN for discharge tomorrow.

## 2022-08-16 NOTE — BH INPATIENT PSYCHIATRY PROGRESS NOTE - NSTXECTGOAL_PSY_ALL_CORE
Other...
Maintain NPO status as indicated prior to procedure
Other...

## 2022-08-16 NOTE — BH INPATIENT PSYCHIATRY PROGRESS NOTE - NSBHASSESSSUMMFT_PSY_ALL_CORE
This is a 66-year-old domiciled female with PPHx of depression, self-reported narcissistic and borderline personality disorders, 1PPH in 2012 for depression and received 8 unilateral ECT treatments per patient, no pSA/NSSIB, BIBS for worsening depressive symptoms since a mitral valve relapse surgery in 3/2022 and patient feeling overwhelmed/more anxious having had no prior medical ailments and then requiring a heart surgery this past March. Patient stable and has routine f/u and TTE in the fall of this year, no other PMH. No substance use history.     Patient recently admitted and receive 2 ECT treatments with good effect but decided to leave as she felt better but two days after discharge on 7/30 she awoke feeling depressed again and decided to return to the hospital for more ECT.      8/1: reviewed past recommendations and suggested that Abilify should be discontinued and question efficacy of ECT.  Will review with TEAM and ECT value of continuing ECT vs med management.   8/3: well groomed, anxious, logical, ativan-focused, elevated BP to 170s, compliant to starting Seroquel, medicine team consulted for ECT clearance and high BP, Seroquel started 50mg qhs   ;;08/04: patient will try Seroquel tonight; being cleared for ECT but concerns about high bp but Effexor being lowered to 187.5mg daily.  Abilify has been discontinued.    - 08/08: patient had ECT this morning. No acute events observed. She felt tired after the session. Still endorsing depressed mood and anxiety. Will continue to adjust medications.    - 08/09: patient still very anxious, but reports no headache or dizziness. No acute events. Complains of anhedonia and abulia. Will have another session of ECT tomorrow. Agreed to switch Ativan to Atarax to decrease interaction with ECT.    - 08/10: patient had another session of ECT (the second, bilateral today). It was uneventful. However, she is still very anxious. Medications changes are being made: will decrease Effexor to 112.5mg today and increase Seroquel to 150 mg. Will give Ativan up until 12 hours prior to ECT, as the patient is very anxious.    ;;08/11: anxious but a little less depressed after ECT #2 (BDI improved 10 points  MMS unchanged); accepting Seroquel for anxiety prn.  ECT #3 in am    -08/12: patient is less anxious today, but still asks for reassurance about her treatment and feels insecure. She took Seroquel PRN yesterday and felt sedated. Today she is not demanding PRNs as before. Will continue to monitor response to ECT.    -08/15: patient had a 4th session of ECT today. Anxiety is improving but she is still depressed. Accepting Seroquel PRN and is not demanding benzodiazepines. Will increase bedtime Seroquel to 200 mg (minimum dose for bipolar depression) and decrease Effexor to 75 mg (due to poor response to antidepressants). Will continue to monitor. This is a 66-year-old domiciled female with PPHx of depression, self-reported narcissistic and borderline personality disorders, 1PPH in 2012 for depression and received 8 unilateral ECT treatments per patient, no pSA/NSSIB, BIBS for worsening depressive symptoms since a mitral valve relapse surgery in 3/2022 and patient feeling overwhelmed/more anxious having had no prior medical ailments and then requiring a heart surgery this past March. Patient stable and has routine f/u and TTE in the fall of this year, no other PMH. No substance use history.     Patient recently admitted and receive 2 ECT treatments with good effect but decided to leave as she felt better but two days after discharge on 7/30 she awoke feeling depressed again and decided to return to the hospital for more ECT.      8/1: reviewed past recommendations and suggested that Abilify should be discontinued and question efficacy of ECT.  Will review with TEAM and ECT value of continuing ECT vs med management.   8/3: well groomed, anxious, logical, ativan-focused, elevated BP to 170s, compliant to starting Seroquel, medicine team consulted for ECT clearance and high BP, Seroquel started 50mg qhs   ;;08/04: patient will try Seroquel tonight; being cleared for ECT but concerns about high bp but Effexor being lowered to 187.5mg daily.  Abilify has been discontinued.    - 08/08: patient had ECT this morning. No acute events observed. She felt tired after the session. Still endorsing depressed mood and anxiety. Will continue to adjust medications.    - 08/09: patient still very anxious, but reports no headache or dizziness. No acute events. Complains of anhedonia and abulia. Will have another session of ECT tomorrow. Agreed to switch Ativan to Atarax to decrease interaction with ECT.    - 08/10: patient had another session of ECT (the second, bilateral today). It was uneventful. However, she is still very anxious. Medications changes are being made: will decrease Effexor to 112.5mg today and increase Seroquel to 150 mg. Will give Ativan up until 12 hours prior to ECT, as the patient is very anxious.    ;;08/11: anxious but a little less depressed after ECT #2 (BDI improved 10 points  MMS unchanged); accepting Seroquel for anxiety prn.  ECT #3 in am    -08/12: patient is less anxious today, but still asks for reassurance about her treatment and feels insecure. She took Seroquel PRN yesterday and felt sedated. Today she is not demanding PRNs as before. Will continue to monitor response to ECT.    -08/15: patient had a 4th session of ECT today. Anxiety is improving but she is still depressed. Accepting Seroquel PRN and is not demanding benzodiazepines. Will increase bedtime Seroquel to 200 mg (minimum dose for bipolar depression) and decrease Effexor to 75 mg (due to poor response to antidepressants). Will continue to monitor.    -08/16: improved anxiety, showing attachment issued with , describing herself as "too emotional", seems very dependent on . Insecure about treatment. Will perform 5th session of ECT tomorrow. So far, has had minor improvement in depression. Anxiety is improving.

## 2022-08-16 NOTE — BH INPATIENT PSYCHIATRY PROGRESS NOTE - NSDCCRITERIA_PSY_ALL_CORE
improved symptoms and continued low risk of harm to self

## 2022-08-16 NOTE — BH INPATIENT PSYCHIATRY PROGRESS NOTE - NSBHMSETHTCONTENT_PSY_A_CORE
Ruminations

## 2022-08-16 NOTE — BH INPATIENT PSYCHIATRY PROGRESS NOTE - NSTXDEPRESGOALOTHER_PSY_ALL_CORE
Pt. will participate in groups and milieu tx structure of unit
Pt. will participate in groups and milieu structure of the unit.
Pt. will participate in groups and milieu tx structure of unit
Pt. will participate in groups and milieu tx structure of unit
Pt. will participate in groups and milieu structure of the unit.

## 2022-08-16 NOTE — BH INPATIENT PSYCHIATRY PROGRESS NOTE - NSBHATTESTCOMMENTATTENDFT_PSY_A_CORE
Recommendations: continue course of ECT; continue Effexor 187.5mg daily with Seroquel 50mg to be titrated upwards slightly; ECT #2 BL on 8/10    Brief hx:This is a 66-year-old domiciled female with PPHx of depression, self-reported narcissistic and borderline personality disorders, 1PPH in 2012 for depression and received 8 unilateral ECT treatments per patient, no pSA/NSSIB, BIBS for worsening depressive symptoms since a mitral valve relapse surgery in 3/2022 and patient feeling overwhelmed/more anxious having had no prior medical ailments and then requiring a heart surgery this past March. Patient stable and has routine f/u and TTE in the fall of this year, no other PMH. No substance use history.   Received 2 ECT tx recently but left hospital and relapsed in 48 hours.     ---xyx  ;;08/08: Not endorsing  suicidal or homicidal ideation intent or plans; no mention of auditory or visual hallucinations Alert; oriented; cognition intact; speech clear; no tremor or evidence of movement impairment. has headache; wonders about how many ECT treatments she should get.   
Recommendations: continue Effexor at 187.5mg for depression augmented with Seroquel which can be titrated upwards somewhat; monitor vs; if stable continue course of ECT    Brief hx: 66-year-old domiciled female with PPHx of depression, self-reported narcissistic and borderline personality disorders, 1PPH in 2012 for depression and received 8 unilateral ECT treatments per patient, no pSA/NSSIB, BIBS for worsening depressive symptoms since a mitral valve relapse surgery in 3/2022 and patient feeling overwhelmed/more anxious having had no prior medical ailments and then requiring a heart surgery this past March. Patient stable and has routine f/u and TTE in the fall of this year, no other PMH. No substance use history.     Patient recently admitted and receive 2 ECT treatments with good effect but decided to leave as she felt better but two days after discharge on 7/30 she awoke feeling depressed again and decided to return to the hospital for more ECT.    ---xyx  ;;08/08: mood unclear after resumption of ECT; Not endorsing  suicidal or homicidal ideation intent or plans; no mention of auditory or visual hallucinations  c/o headache .  Alert; oriented; cognition intact; speech clear; no tremor or evidence of movement impairment. 
;;08/16: wants to leave on 8/18 but wants ECT #5 tomorrow; "I'm depressed."  but Not endorsing  suicidal or homicidal ideation intent or plans; no mention of auditory or visual hallucinations .  Issue of effectiveness of ECT in question .  Minimally less anxious and somewhat less sad.  Sleep  appetite ok; no pain issues. Alert; oriented; cognition intact; speech clear; no tremor or evidence of movement impairment.  Can be discharged tomorrow pm to [facility] program for 8/18 after evaluation after ECT.  Anxiety and chronic issues can be addressed as an outpatient.  
;;08/09: Not endorsing  suicidal or homicidal ideation intent or plans; no mention of auditory or visual hallucinations ; poor sleep and appetite; willing to take higher dose of Seroquel.  For ECT #2 if COVID STAT negative. 
Recommendations: continue current medication regime and ECT#4 UL on 8/    Brief hx:  66-year-old domiciled female with PPHx of depression, self-reported narcissistic and borderline personality disorders, 1PPH in 2012 for depression and received 8 unilateral ECT treatments per patient, no pSA/NSSIB, BIBS for worsening depressive symptoms since a mitral valve relapse surgery in 3/2022 and patient feeling overwhelmed/more anxious having had no prior medical ailments and then requiring a heart surgery this past March. Patient stable and has routine f/u and TTE in the fall of this year, no other PMH. No substance use history. .  Returns after relapse 48 hours after leaving after 2 ECT treatments.     ---xyx  ;;08/12: brief smile after third unilateral ECT; Not endorsing  suicidal or homicidal ideation intent or plans; no mention of auditory or visual hallucinations Alert; oriented; cognition intact; speech clear; no tremor or evidence of movement impairment.  attends selective groups; appears to be responding to current treatment efforts including prn Seroquel added to pm Seroquel and minimal use of benzodiazepines. 
;;08/15: patient appears anxious but guarded about response to ECT; ambivalent about continuing course; however Not endorsing  suicidal or homicidal ideation intent or plans; no mention of auditory or visual hallucinations .  Discussed possible dc after ECT #5 on 8/17.   The lack of a clear response suggests that this might not be an effective approach ; however only UL has been attempted. 
;;08/10: continues very anxious; aware of nightmares but need to monitor for recurrence; lowering noradrenergic load by lowering Effexor to 112.5mg daily and raising Seroquel to 150mg po at night;  Ativan can be use judiciously until noon day before ECT.  Flumazenil not used today.

## 2022-08-16 NOTE — BH INPATIENT PSYCHIATRY PROGRESS NOTE - NSCGISEVERILLNESS_PSY_ALL_CORE
4 = Moderately ill – overt symptoms causing noticeable, but modest, functional impairment or distress; symptom level may warrant medication

## 2022-08-16 NOTE — BH INPATIENT PSYCHIATRY PROGRESS NOTE - NSTXDEPRESGOAL_PSY_ALL_CORE
Other...
Report using a coping skill to overcome sadness and worry in order to socialize with peers daily
Other...
Exhibit improvements in self-grooming, hygiene, sleep and appetite
Other...
Exhibit improvements in self-grooming, hygiene, sleep and appetite
Other...
Other...
Exhibit improvements in self-grooming, hygiene, sleep and appetite

## 2022-08-16 NOTE — BH INPATIENT PSYCHIATRY PROGRESS NOTE - NSTXDEPRESDATEEST_PSY_ALL_CORE
08-Aug-2022
15-Aug-2022
08-Aug-2022
01-Aug-2022
15-Aug-2022
08-Aug-2022
01-Aug-2022
01-Aug-2022
08-Aug-2022
01-Aug-2022

## 2022-08-16 NOTE — BH TREATMENT PLAN - NSTXPLANTHERAPYSESSIONSFT_PSY_ALL_CORE
08-15-22  Type of therapy: Creative arts therapy  Type of session: Group  Level of patient participation: Declines to participate  --  Therapy conducted by: Psych rehab  Therapy Summary: Pt. is isolative and politely declines invitations to attend groups. Pt. gives various reasons for not wanting to attend groups such as being tired, having back pain, not having the necessary skills for the offered activities, and needing time to herself after ECT treatment. Pt. recently stated that she would come to an art group the next time one is offered.  
  08-08-22  Type of therapy: Coping skills,Creative arts therapy,Spirituality  Type of session: Group  Level of patient participation: Resistance to participation  Duration of participation: Less than 15 minutes  Therapy conducted by: Psych rehab  Therapy Summary: Pt. has continued to decline invitations to groups of all modalities; pt. appears to anticipate anxiety and exposure in group settings; pt. is isolative to her room, on the phone, or making queries at the nursing station

## 2022-08-16 NOTE — BH INPATIENT PSYCHIATRY PROGRESS NOTE - NSBHCONSBHPROVDETAILS_PSY_A_CORE  FT
Psychiatrist Dr. Saldaña [phone]  Therapist Marianne Pratt [phone]
Psychiatrist Dr. Saldaña [phone]  Therapist Marianne Pratt [phone]
Psychiatrist Dr. Saldaña [phone]  Therapist Marianne Pratt [phone]
Psychiatrist Dr. Saldaña [phone]  Therapist Marianne Pratt [phone]
Psychiatrist Dr. Saldaña [phone]  Therapist Marianne Pratt [phone]
Psychiatrist Dr. Saldaña [phone]  Therapist Marianne Pratt [phone]
Psychiatrist Dr. Saldaña [phone]  Therapist Marianne Pratt [phone]
Psychiatrist Dr. Saldaña [phone]  Therapist Marianne Pratt [phone]
Psychiatrist Dr. Saldaña [phone]  Therapist Marianne Pratt [phone]
Psychiatrist Dr. Saldaña [phone]  Therapist Marianne Pratt [phone]
Psychiatrist Dr. Saldaña [phone]  Therapist Marianne Pratt [phone]
Psychiatrist Dr. Saldaña [phone]  Therapist Marianne Pratt [phone]

## 2022-08-16 NOTE — BH INPATIENT PSYCHIATRY PROGRESS NOTE - NSTXECTINTERMD_PSY_ALL_CORE
2 sessions so far. Seems to present an initial improvement. Psychopharm management x 15 min daily. Individual therapy. 
2 sessions so far. Seems to present an initial improvement.
2 sessions so far. Seems to present an initial improvement. Psychopharm management x 15 min daily. Individual therapy. 

## 2022-08-16 NOTE — BH INPATIENT PSYCHIATRY PROGRESS NOTE - NSTXECTGOALOTHER_PSY_ALL_CORE
patient will need to be NPO tonight for ECT procedure 8/15
patient will need to be NPO tonight for ECT procedure 8/15

## 2022-08-16 NOTE — BH INPATIENT PSYCHIATRY PROGRESS NOTE - NSTXDEPRESPROGRES_PSY_ALL_CORE
No Change
Improving
No Change
Improving
No Change

## 2022-08-16 NOTE — BH DISCHARGE NOTE NURSING/SOCIAL WORK/PSYCH REHAB - NSDCADDINFO1FT_PSY_ALL_CORE
You are scheduled to attend a VIRTUAL intake appointment with the [facility] Partial Hospitalization Program on THURSDAY, AUGUST 18, 2022 at 11:00AM. The program will send you a link to the appointment via email. If you have any questions, please call the program at: [phone].

## 2022-08-16 NOTE — BH INPATIENT PSYCHIATRY PROGRESS NOTE - NSBHMETABOLIC_PSY_ALL_CORE_FT
BMI: BMI (kg/m2): 21.8 (07-31-22 @ 17:40)  HbA1c: A1C with Estimated Average Glucose Result: 5.4 % (08-04-22 @ 07:23)    Glucose:   BP: 123/76 (08-16-22 @ 09:00) (108/71 - 146/83)  Lipid Panel: Date/Time: 08-04-22 @ 07:23  Cholesterol, Serum: 218  Direct LDL: --  HDL Cholesterol, Serum: 86  Total Cholesterol/HDL Ration Measurement: --  Triglycerides, Serum: 91   BMI: BMI (kg/m2): 21.8 (07-31-22 @ 17:40)  HbA1c: A1C with Estimated Average Glucose Result: 5.4 % (08-04-22 @ 07:23)    Glucose:   BP: 138/85 (08-16-22 @ 17:00) (108/71 - 146/83)  Lipid Panel: Date/Time: 08-04-22 @ 07:23  Cholesterol, Serum: 218  Direct LDL: --  HDL Cholesterol, Serum: 86  Total Cholesterol/HDL Ration Measurement: --  Triglycerides, Serum: 91

## 2022-08-16 NOTE — BH DISCHARGE NOTE NURSING/SOCIAL WORK/PSYCH REHAB - NSCDUDCCRISIS_PSY_A_CORE
[facility] Well  1 (362) [facility]-WELL ([phone])  Text "WELL" to 87140  Website: www.[employer]/.National Suicide Prevention Lifeline 1 (374) 696-8832/.  [facility]  1 (288) LIF[facility] (557-4989)/.  [facility]  [address] [place] [address] [phone]   Hours:  Monday through Friday from 9 AM to 3 PM [facility] Well  1 (721) [facility]-WELL ([phone])  Text "WELL" to 74352  Website: www.[employer]/.[employer] 1 (957) 581-OTKL (2696) Website: www.safehorizon.org/.National Suicide Prevention Lifeline 8 (831) 122-0627/.  [facility]  1 (465) LIFENET (949-7891)/.  [facility]  [address] [place] [address] [phone]   Hours:  Monday through Friday from 9 AM to 3 PM/.  [facility] [facility] Crisis Line  5 [phone], Option 1

## 2022-08-16 NOTE — BH INPATIENT PSYCHIATRY PROGRESS NOTE - NSBHCONTPROVIDER_PSY_ALL_CORE
No, attempted...

## 2022-08-16 NOTE — BH DISCHARGE NOTE NURSING/SOCIAL WORK/PSYCH REHAB - PATIENT PORTAL LINK FT
You can access the FollowMyHealth Patient Portal offered by [facility] by registering at the following website: [email or web address]. By joining [facility]’s FollowMyHealth portal, you will also be able to view your health information using other applications (apps) compatible with our system.

## 2022-08-16 NOTE — BH INPATIENT PSYCHIATRY PROGRESS NOTE - NSTXECTDATETRGT_PSY_ALL_CORE
28-Jul-2022
15-Aug-2022
10-Aug-2022
10-Aug-2022
15-Aug-2022
28-Jul-2022
15-Aug-2022
28-Jul-2022
15-Aug-2022
10-Aug-2022
15-Aug-2022
28-Jul-2022

## 2022-08-16 NOTE — BH INPATIENT PSYCHIATRY PROGRESS NOTE - NSBHMSEKNOWHOW_PSY_ALL_CORE
Current Events

## 2022-08-16 NOTE — BH TREATMENT PLAN - NSTXDEPRESINTERRN_PSY_ALL_CORE
Patient will verbalize understanding indications, S/E of medications in order to promote medication compliance. Patient will verbalize and utilize coping skills to manage depressive symptoms.

## 2022-08-16 NOTE — BH INPATIENT PSYCHIATRY PROGRESS NOTE - NSBHMSEAFFQUAL_PSY_A_CORE
Affect is better, no anhedonia, but still anxious./Anxious
Affect is better, no anhedonia, but still anxious./Anxious
Depressed
Affect is better, no anhedonia, but still anxious./Anxious
Affect is better, no anhedonia, but still anxious./Anxious
Anxious
Depressed
Affect is better, no anhedonia, but still anxious./Anxious
Affect is better, no anhedonia, but still anxious./Anxious

## 2022-08-16 NOTE — BH PSYCHOLOGY - CLINICIAN PSYCHOTHERAPY NOTE - NSBHPSYCHOLGOALS_PSY_A_CORE
Decrease symptoms/Prevent relapse/Psychoeducation
Decrease symptoms/Assessment/Improve social/vocational/coping skills/Prevent relapse/Psychoeducation/Treatment compliance
Decrease symptoms/Assessment/Improve social/vocational/coping skills/Prevent relapse/Psychoeducation/Treatment compliance

## 2022-08-16 NOTE — BH DISCHARGE NOTE NURSING/SOCIAL WORK/PSYCH REHAB - NSDCPRGOAL_PSY_ALL_CORE
Over the course of tx., pt. endorsed depression, anxiety, and reluctance to interact in the group setting; pt. was more visible on the unit than previous admission, however only pursued nursing and clinical staff (rarely other pts); pt. endorsed anxiety regarding discharge but was able to identify coping skills and support system to employ in face of future mental health crisis.

## 2022-08-16 NOTE — BH TREATMENT PLAN - NSTXPATIENTPARTICIPATE_PSY_ALL_CORE
Patient participated in identification of needs/problems/goals for treatment/Patient participated in defining interventions
Patient participated in identification of needs/problems/goals for treatment/Patient participated in defining interventions
Patient participated in identification of needs/problems/goals for treatment

## 2022-08-16 NOTE — BH TREATMENT PLAN - NSTXDEPRESGOALOTHER_PSY_ALL_CORE
Pt. will participate in groups and milieu tx structure of unit
Pt. will participate in groups and milieu structure of the unit.

## 2022-08-16 NOTE — BH INPATIENT PSYCHIATRY PROGRESS NOTE - NSTXDEPRESDATENEW_PSY_ALL_CORE
16-Jul-2022

## 2022-08-16 NOTE — BH TREATMENT PLAN - NSTXDEPRESINTERPR_PSY_ALL_CORE
Pt. will be invited and encouraged to join all offered groups
Pt. will continue to be invited and encouraged to attend all offered groups.
Pt. will continue to be encouraged to join all offered groups.

## 2022-08-16 NOTE — BH INPATIENT PSYCHIATRY PROGRESS NOTE - NSTXDEPRESINTERMD_PSY_ALL_CORE
Continues on Effexor XR and Abilify. Two sessions of ECT so far. Individual therapy. 
Continues on Effexor XR and Seeroquel with ECT for mood; 15min/day  
Continues on Effexor XR and Abilify. Two sessions of ECT so far. Individual therapy. 
Continues on Effexor XR and Seroquel with ECT for mood; 15min/day , Julissa VALENTIN for discharge tomorrow. 
Continues on Effexor XR and Abilify. Two sessions of ECT so far. Individual therapy. 
Continues on Effexor XR and Abilify. Two sessions of ECT so far. Individual therapy. 
Continues on Effexor XR and Seeroquel with ECT for mood; 15min/day  
Continues on Effexor XR and Seeroquel with ECT for mood; 15min/day  
Continues on Effexor XR and Abilify. Two sessions of ECT so far. Individual therapy. 
Continues on Effexor XR and Seeroquel with ECT for mood; 15min/day  
Continues on Effexor XR and Abilify. Two sessions of ECT so far.
Continues on Effexor XR and Seeroquel with ECT for mood; 15min/day

## 2022-08-16 NOTE — BH INPATIENT PSYCHIATRY PROGRESS NOTE - NSTXECTPROGRES_PSY_ALL_CORE
No Change
Improving
No Change
Improving
No Change
No Change

## 2022-08-16 NOTE — BH INPATIENT PSYCHIATRY PROGRESS NOTE - NSTXECTDATEEST_PSY_ALL_CORE
09-Aug-2022
08-Aug-2022
08-Aug-2022
09-Aug-2022
21-Jul-2022
08-Aug-2022
21-Jul-2022
21-Jul-2022
09-Aug-2022
21-Jul-2022
14-Aug-2022
14-Aug-2022

## 2022-08-16 NOTE — BH TREATMENT PLAN - NSCMSPTSTRENGTHS_PSY_ALL_CORE
Expressive of emotions/Lauren/spirituality/Financial stability/Future/goal oriented/Leisure interest
Compliance to treatment/Highly motivated for treatment/Interpersonal skills
Compliance to treatment/Highly motivated for treatment/Interpersonal skills

## 2022-08-16 NOTE — BH INPATIENT PSYCHIATRY PROGRESS NOTE - NSBHCONSDANGERSELF_PSY_A_CORE
suicidal behavior/unable to care for self

## 2022-08-16 NOTE — BH INPATIENT PSYCHIATRY PROGRESS NOTE - NSTXPROBECT_PSY_ALL_CORE
ECT, PATIENT RECEIVING

## 2022-08-16 NOTE — BH INPATIENT PSYCHIATRY PROGRESS NOTE - NSBHMSETHTPROC_PSY_A_CORE
Linear/Normal reasoning

## 2022-08-16 NOTE — BH PSYCHOLOGY - CLINICIAN PSYCHOTHERAPY NOTE - NSTXDEPRESGOAL_PSY_ALL_CORE
Other...
Exhibit improvements in self-grooming, hygiene, sleep and appetite
Exhibit improvements in self-grooming, hygiene, sleep and appetite

## 2022-08-16 NOTE — BH PSYCHOLOGY - CLINICIAN PSYCHOTHERAPY NOTE - NSBHPSYCHOLINT_PSY_A_CORE
Problem-solving techniques discussed/Supportive therapy
Dynamic issues addressed/Problem-solving techniques discussed/Supported coping skills/Supportive therapy/Treatment compliance encouraged
Dynamic issues addressed/Problem-solving techniques discussed/Reality testing/Supported coping skills/Supportive therapy/Treatment compliance encouraged

## 2022-08-16 NOTE — BH TREATMENT PLAN - NSPTSTATEDGOAL_PSY_ALL_CORE
"to get ECT" (for depression). 
"I would like to begin inpatient ECT as soon as possible."
"to get ECT" (for depression).

## 2022-08-16 NOTE — BH INPATIENT PSYCHIATRY PROGRESS NOTE - NSBHMSELANG_PSY_A_CORE
Impaired naming
No abnormalities noted
No abnormalities noted
Impaired naming
No abnormalities noted
Impaired naming
No abnormalities noted
Impaired naming
Impaired naming

## 2022-08-16 NOTE — BH INPATIENT PSYCHIATRY PROGRESS NOTE - NSBHCHARTREVIEWVS_PSY_A_CORE FT
Vital Signs Last 24 Hrs  T(C): 37.2 (08-16-22 @ 09:00), Max: 37.5 (08-15-22 @ 17:18)  T(F): 98.9 (08-16-22 @ 09:00), Max: 99.5 (08-15-22 @ 17:18)  HR: 105 (08-16-22 @ 09:00) (105 - 114)  BP: 123/76 (08-16-22 @ 09:00) (123/76 - 125/81)  BP(mean): --  RR: 18 (08-16-22 @ 09:00) (16 - 18)  SpO2: 96% (08-16-22 @ 09:00) (96% - 98%)     Vital Signs Last 24 Hrs  T(C): 37.2 (08-16-22 @ 17:00), Max: 37.2 (08-16-22 @ 09:00)  T(F): 99 (08-16-22 @ 17:00), Max: 99 (08-16-22 @ 17:00)  HR: 96 (08-16-22 @ 17:00) (96 - 105)  BP: 138/85 (08-16-22 @ 17:00) (123/76 - 138/85)  BP(mean): --  RR: 18 (08-16-22 @ 17:00) (18 - 18)  SpO2: 99% (08-16-22 @ 17:00) (96% - 99%)

## 2022-08-16 NOTE — BH TREATMENT PLAN - NSTXECTINTERRN_PSY_ALL_CORE
Patient will verbalize understanding indications, S/E of medications in order to promote medication compliance. Patient will verbalize and utilize coping skills to manage depressive symptoms.
Patient will verbalize understanding indications, S/E of medications in order to promote medication compliance. Patient will verbalize and utilize coping skills to manage depressive symptoms.

## 2022-08-16 NOTE — BH INPATIENT PSYCHIATRY PROGRESS NOTE - NSBHFUPINTERVALHXFT_PSY_A_CORE
The patient was interviewed today for follow up. She had just met with her  who was visiting. She said she was feeling emotional after seeing him and down. She says that she feels sad because they are physically  (she says they have been back together since the past year). She says: "I feel sad because I want to be well. I want to have that positivity". She says that she fears that her  would leave her for a woman "whose brain is normal". She believes she had some improvement with ECT but not the same as she had in the past. She feels "a little bit calmer" but still feels depressed. She slept well during the night and denies SI.

## 2022-08-16 NOTE — BH INPATIENT PSYCHIATRY PROGRESS NOTE - NSTXDEPRESDATETRGT_PSY_ALL_CORE
22-Aug-2022
15-Aug-2022
08-Aug-2022
15-Aug-2022
08-Aug-2022
15-Aug-2022
15-Aug-2022
22-Aug-2022

## 2022-08-16 NOTE — BH TREATMENT PLAN - NSTXECTGOAL_PSY_ALL_CORE
Other...
Maintain NPO status as indicated prior to procedure
Maintain NPO status as indicated prior to procedure

## 2022-08-16 NOTE — BH INPATIENT PSYCHIATRY PROGRESS NOTE - NSBHATTESTSTAFFAMEND_PSY_A_CORE
I have personally seen and examined this patient. I fully participated in the care of this patient. I have made amendments to the documentation where appropriate and otherwise agree with the history, physical exam, and plan as documented by the

## 2022-08-16 NOTE — BH TREATMENT PLAN - NSTXDEPRESGOAL_PSY_ALL_CORE
Other...
Other...
Report using a coping skill to overcome sadness and worry in order to socialize with peers daily

## 2022-08-16 NOTE — BH PSYCHOLOGY - CLINICIAN PSYCHOTHERAPY NOTE - TOKEN PULL-DIAGNOSIS
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      

## 2022-08-16 NOTE — BH INPATIENT PSYCHIATRY PROGRESS NOTE - NSBHADMITMEDEDUDETAILS_PSY_A_CORE FT
ARIPiprazole 10 milliGRAM(s) Oral daily augmenting venlafaxine  milliGRAM(s) Oral daily  

## 2022-08-16 NOTE — BH PSYCHOLOGY - CLINICIAN PSYCHOTHERAPY NOTE - NSBHPSYCHOLNARRATIVE_PSY_A_CORE FT
Balbina is a 67-year-old domiciled,  female who had been  with her  but has recently gotten back together with him again. She reported that he had moved out of the apartment when they were  but has since moved back into the apartment that is now shared between them. She presents with a history of depression and self-reported previously diagnosed personality disorders, who self-presented with worsening depressive symptoms following heart surgery in March though without other clear precipitating factors. She was recently discharged from [facility], and then was voluntarily readmitted given worsening depressive symptoms. She has stated desiring continued ECT sessions.     SESSION CONTENT: Balbina was administered the CAMS and a safety plan was conducted (refer to chart). Although she resisted CAMS, she was able to be redirected to engage. During and after the administration, she was able to engage in answering questions and discussed more of her experience and feelings. She reported that she was very worried about being discharged, feels that she is not ready because the ECT has not made her feel completely better and is anxious about being on the outside and not being able to do daily living activities such as grooming, cooking and shopping. She reported that she is worried about being a burden to her , as well as to others, and wants to feel better so that she is not a burden. She reported being anxious about being alone, experiencing passive suicidal thoughts, and described feelings of sadness. The therapist validated and normalized these feelings surrounding discharge, aided in cognitive understanding of how some of her fears may be contributing to her desire to stay in the unit and supported treatment compliance. Therapist and patient discussed suicidality, reasons for living, coping strategies and a treatment plan once discharged.     BEHAVIORAL OBSERVATIONS: Balbina was observed to be more anxious, which she reported as well.      RISK FACTORS:                          Static - Hx of depressive episodes, Hx of heart surgery                          Modifiable - improved mood, response to ECT                          Protective - Stable housing, outpatient care, friends and social support,   Level of risk: Moderate.   APPEARANCE:  [] adequately groomed [X] disheveled [] malodorous [] Other: Hair had not been washed.    BEHAVIOR: [X] cooperative [] uncooperative [x] good EC [] poor EC [] well related [] oddly related [] guarded []PMA [] PMR []abnormal movements [] Other:   SPEED: [] normal rate/rhythm/volume [] loud [X] quiet [] slow [] rapid [x] pressured [] Other: _________   MOOD: [] euthymic [X] dysphoric [x] anxious [] irritable [] Other: ___________   AFFECT: [] full [] expansive [x] constricted [] blunted [] flat [] stable [] labile [] Other: ________________ THOUGHT PROCESS: [] organized [] disorganized [x] goal-directed [x] concrete [] logical [X] illogical   [] circumstantial [] tangential [] impoverished [] effusive [X] repetitive [] Other:  THOUGHT CONTENT: [] negative for delusions/suicidal ideation /homicidal ideation [X] positive for delusions/suicidal ideation/homicidal ideation [] Unable to assess Describe: CAMS was administered. Results were high, indicating higher risk for suicidality. Ms. Friedman reported that she hates that she thinks about suicide at times and describes passive suicidal thoughts such as "I don't want to live with depression anymore". She described a previous thought she had about jumping in front of the subway. She denied intent, plan or attempt, and stated several reasons for living, including curiosity, a desire to read again and love for family.    PERCEPTION: [X] negative for auditory/ visual hallucinations [] positive for auditory/ visual hallucinations [] Unable to assess Describe: ________________________________________________________   INSIGHT/JUDGMENT: [] good []fair [X] poor        IMPULSE CONTROL: [] good [X]fair [] poor     COGNITION: [x] alert and oriented to person, time, place [] Lacks orientation to person/time/place. Describe: ___________________________   Balbina is a 67-year-old domiciled,  female who had been  with her  but has recently gotten back together with him again. She reported that he had moved out of the apartment when they were  but has since moved back into the apartment that is now shared between them. She presents with a history of depression and self-reported previously diagnosed personality disorders, who self-presented with worsening depressive symptoms following heart surgery in March though without other clear precipitating factors. She was recently discharged from [facility], and then was voluntarily readmitted given worsening depressive symptoms. She has stated desiring continued ECT sessions.     SESSION CONTENT: Balbina was administered the CAMS and a safety plan was conducted (refer to chart). Although she resisted CAMS, she was able to be redirected to engage. During and after the administration, she was able to engage in answering questions and discussed more of her experience and feelings. She reported that she was very worried about being discharged, feels that she is not ready because the ECT has not made her feel completely better and is anxious about being on the outside and not being able to do daily living activities such as grooming, cooking and shopping. She reported that she is worried about being a burden to her , as well as to others, and wants to feel better so that she is not a burden. She reported being anxious about being alone, experiencing passive suicidal thoughts, and described feelings of sadness. The therapist validated and normalized these feelings surrounding discharge, aided in cognitive understanding of how some of her fears may be contributing to her desire to stay in the unit and supported treatment compliance. Therapist and patient discussed suicidality, reasons for living, coping strategies and a treatment plan once discharged.     BEHAVIORAL OBSERVATIONS: Balbina was observed to be more anxious, which she reported as well.      RISK FACTORS:                          Static - Hx of depressive episodes, Hx of heart surgery                          Modifiable - improved mood, response to ECT                          Protective - Stable housing, outpatient care, friends and social support,   Level of risk: Moderate.   APPEARANCE:  [] adequately groomed [X] disheveled [] malodorous [] Other: Hair had not been washed.    BEHAVIOR: [X] cooperative [] uncooperative [x] good EC [] poor EC [] well related [] oddly related [] guarded []PMA [] PMR []abnormal movements [] Other:   SPEED: [] normal rate/rhythm/volume [] loud [X] quiet [] slow [] rapid [x] pressured [] Other: _________   MOOD: [] euthymic [X] dysphoric [x] anxious [] irritable [] Other: ___________   AFFECT: [] full [] expansive [x] constricted [] blunted [] flat [] stable [] labile [] Other: ________________ THOUGHT PROCESS: [] organized [] disorganized [x] goal-directed [x] concrete [] logical [X] illogical   [] circumstantial [] tangential [] impoverished [] effusive [X] repetitive [] Other:  THOUGHT CONTENT: [] negative for delusions/suicidal ideation /homicidal ideation [X] positive for delusions/suicidal ideation/homicidal ideation [] Unable to assess Describe: Ms. Friedman reported that she hates that she thinks about suicide at times and describes passive suicidal thoughts such as "I don't want to live with depression anymore". She described a previous thought she had about jumping in front of the subway. She denied intent, plan or attempt, and stated several reasons for living, including curiosity, a desire to read again and love for family.    PERCEPTION: [X] negative for auditory/ visual hallucinations [] positive for auditory/ visual hallucinations [] Unable to assess Describe: ________________________________________________________   INSIGHT/JUDGMENT: [] good []fair [X] poor        IMPULSE CONTROL: [] good [X]fair [] poor     COGNITION: [x] alert and oriented to person, time, place [] Lacks orientation to person/time/place. Describe: ___________________________

## 2022-08-17 VITALS
DIASTOLIC BLOOD PRESSURE: 91 MMHG | RESPIRATION RATE: 18 BRPM | OXYGEN SATURATION: 95 % | SYSTOLIC BLOOD PRESSURE: 143 MMHG

## 2022-08-17 PROCEDURE — 90870 ELECTROCONVULSIVE THERAPY: CPT

## 2022-08-17 PROCEDURE — 99238 HOSP IP/OBS DSCHRG MGMT 30/<: CPT | Mod: 25

## 2022-08-17 RX ORDER — VENLAFAXINE HCL 75 MG
1 CAPSULE, EXT RELEASE 24 HR ORAL
Qty: 30 | Refills: 0
Start: 2022-08-17 | End: 2022-09-15

## 2022-08-17 RX ORDER — TRAZODONE HCL 50 MG
1 TABLET ORAL
Qty: 15 | Refills: 0
Start: 2022-08-17 | End: 2022-08-31

## 2022-08-17 RX ORDER — QUETIAPINE FUMARATE 200 MG/1
1 TABLET, FILM COATED ORAL
Qty: 30 | Refills: 0
Start: 2022-08-17 | End: 2022-09-15

## 2022-08-17 RX ADMIN — Medication 75 MILLIGRAM(S): at 11:55

## 2022-08-17 NOTE — BH INPATIENT PSYCHIATRY DISCHARGE NOTE - NSBHMETABOLIC_PSY_ALL_CORE_FT
BMI: BMI (kg/m2): 21.8 (08-17-22 @ 07:35)  HbA1c: A1C with Estimated Average Glucose Result: 5.4 % (08-04-22 @ 07:23)    Glucose:   BP: 143/91 (08-17-22 @ 08:45) (108/71 - 150/88)  Lipid Panel: Date/Time: 08-04-22 @ 07:23  Cholesterol, Serum: 218  Direct LDL: --  HDL Cholesterol, Serum: 86  Total Cholesterol/HDL Ration Measurement: --  Triglycerides, Serum: 91

## 2022-08-17 NOTE — BH INPATIENT PSYCHIATRY DISCHARGE NOTE - NSDCMRMEDTOKEN_GEN_ALL_CORE_FT
QUEtiapine 200 mg oral tablet: 1 tab(s) orally once a day (at bedtime)  traZODone 50 mg oral tablet: 1 tab(s) orally once a day (at bedtime), As needed, insomnia  venlafaxine 75 mg oral capsule, extended release: 1 cap(s) orally once a day

## 2022-08-17 NOTE — ECT PRE-PROCEDURE CHECKLIST - NSPROEDAREADYLEARN_GEN_A_NUR
acuteness of illness/anxiety

## 2022-08-17 NOTE — ECT TREATMENT NOTE - NSECTPTEVAL_PSY_ALL_CORE
Patient evaluated and History and Physical reviewed prior to ECT. There are no significant changes to the patient's condition unless specified.

## 2022-08-17 NOTE — ECT PRE-PROCEDURE CHECKLIST - NSPROPTRIGHTSUPPORTNAME_GEN_A_NUR
Asa,  ( for 8 years, but remains a member of support system)

## 2022-08-17 NOTE — ECT PRE-PROCEDURE CHECKLIST - NSPROEDALEARNPREF_GEN_A_NUR
verbal instruction/written material

## 2022-08-17 NOTE — BH INPATIENT PSYCHIATRY DISCHARGE NOTE - HPI (INCLUDE ILLNESS QUALITY, SEVERITY, DURATION, TIMING, CONTEXT, MODIFYING FACTORS, ASSOCIATED SIGNS AND SYMPTOMS)
Mrs. Friedman is a 68yo female with no dependents, domiciled in a private apartment, working as a . She has been  from her  for 8 years now. Prior to her hospitalization, she had been increasingly depressed and anxious, losing interest in activities she previously enjoyed, motivation and energy. On a recent admission to [facility], she had two sessions of ECT treatment, was discharged on her own request prior to the treatment team's recommendations, but then sought voluntarily readmission due to her worsening depressive symptoms. On admission, she requested to complete more ECT sessions. Staff on the inpatient unit expressed that her cognitive functioning should be assessed, to clarify factors impacting her current presentation.

## 2022-08-17 NOTE — PRE-ANESTHESIA EVALUATION ADULT - HEART RATE (BEATS/MIN)
104 Spoke with [facility]mata for refill of Gleevec. Patient indicates no medication changes since last pharmacist counseling. No questions for the pharmacist. Medication shipped to [address] [place] [id number]   via [employer] for delivery on 01.28.20.   - 1/24/2020 NEEL

## 2022-08-17 NOTE — BH INPATIENT PSYCHIATRY DISCHARGE NOTE - NSDCHC_MEDRECSTATUS_GEN_ALL_CORE
As above, chart and images reviewed.  Will perform paracentesis and left thoracentesis.
Admission Reconciliation is Not Complete  Discharge Reconciliation is Completed
No lesions; no rash

## 2022-08-17 NOTE — BH INPATIENT PSYCHIATRY DISCHARGE NOTE - NSDCPROCEDURESFT_PSY_ALL_CORE
COVID-19 PCR: NotDetec (10 Aug 2022 14:24)  COVID-19 PCR: NotDetec (09 Aug 2022 12:22)  COVID-19 PCR: NotDetec (03 Aug 2022 18:22)  COVID-19 PCR: NotDetec (01 Aug 2022 19:14)  COVID-19 PCR: Negative (31 Jul 2022 12:22)  COVID-19 PCR: NotDetec (22 Jul 2022 11:46)  COVID-19 PCR: Negative (16 Jul 2022 19:55)

## 2022-08-17 NOTE — BH INPATIENT PSYCHIATRY DISCHARGE NOTE - ATTENDING DISCHARGE PHYSICAL EXAMINATION:
;;08/17: Future oriented; looks forward to reading when she goes home; feels tired but wants lunch.  Not endorsing  suicidal or homicidal ideation intent or plans; no mention of auditory or visual hallucinations i&j fair to poor : aware of medications and acknowledges symptoms but not reflective in a meaningful way  on issues that impact on symptoms. Alert; oriented; cognition intact; speech clear; no tremor or evidence of movement impairment.  Thinking is congruent with affect; no peculiarities of thinking or language use.  Fair to good eye contact; speech clear spontaneous and normal volume   some psychomotor retardation

## 2022-08-17 NOTE — ECT TREATMENT NOTE - NSECTFOCPECOMPLET_PSY_ALL_CORE
Focused Physical Exam Completed

## 2022-08-17 NOTE — BH INPATIENT PSYCHIATRY DISCHARGE NOTE - NSDCCPCAREPLAN_GEN_ALL_CORE_FT
PRINCIPAL DISCHARGE DIAGNOSIS  Diagnosis: Severe recurrent major depression  Assessment and Plan of Treatment:       SECONDARY DISCHARGE DIAGNOSES  Diagnosis: Mitral valve replaced  Assessment and Plan of Treatment:     Diagnosis: High blood pressure  Assessment and Plan of Treatment:

## 2022-08-17 NOTE — ECT TREATMENT NOTE - NSECTIMPPLAN_PSY_ALL_CORE
Assessment today offers no contraindications to continue plan of treatment with ECT.

## 2022-08-17 NOTE — BH INPATIENT PSYCHIATRY DISCHARGE NOTE - DESCRIPTION
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 Lives alone in apartment.  from ex- but not formerly . Denies substance use.

## 2022-08-17 NOTE — BH INPATIENT PSYCHIATRY DISCHARGE NOTE - OTHER PAST PSYCHIATRIC HISTORY (INCLUDE DETAILS REGARDING ONSET, COURSE OF ILLNESS, INPATIENT/OUTPATIENT TREATMENT)
Per Mrs. Friedman’s chart and clinical interview, she has a past psychiatric history that includes dx of narcissistic & borderline personality disorder and major depressive disorder. She has a history of 2 inpatient psychiatric hospitalizations and has undergone electroconvulsive therapy (ECT)  most recently at [facility] (07/22/22 - 07/28/22) and previously about 10 years ago. She denied history of suicide attempts or self-harm.   Mrs. Friedman brought herself back to the inpatient unit with persistently depressed mood and significant anxiety, and after being referred by an outpatient psychiatrist for worsening depressive symptoms since her discharge from [facility]. Mrs. Friedman reported that she is awaiting her next ECT session.     Mrs. Friedman reported that she has had previous episodes of major depression but that is has never been “like this”, indicating that this is her worst depressive episode. She stated that she does not understand why, and denied any potential triggers for her depressive experiences.   Contributing factors include recent mitrovalve replacement, and psychosocial factors.

## 2022-08-17 NOTE — ECT TREATMENT NOTE - NSICDXBHSECONDARYDX_PSY_ALL_CORE
Severe episode of recurrent major depressive disorder, without psychotic features   F33.2  

## 2022-08-17 NOTE — ECT TREATMENT NOTE - NSECTPOSTTXSUMMARY_PSY_ALL_CORE
The patient had a well modified grand mal seizure under general anesthesia and a muscle relaxant.  The patient is alert, responsive, in no acute distress.  Recovery uneventful.   

## 2022-08-17 NOTE — BH INPATIENT PSYCHIATRY DISCHARGE NOTE - HOSPITAL COURSE
Received at total of 5 ECT treatments 4 UL and 1 BL earlier in course which added to 2 UL ECT from week prior to admission  .  At same time Effexor tapered to 75mg daily from 300mg because of concerns about HTN and also concerns that there may have been paradoxical worsening of anxiety .  Abilify was stopped and Seroquel added raised to 200mg at night.  Patient was very Ativan seeking for anxiety but accepted Seroquel 25mg  po prn.   Patient had  as visitor and visits appeared to have gone well.  Patient stated on day of discharge that she had no SI and would be with her  night after discharge with remote PHP in am.       Received at total of 5 ECT treatments 4 UL and 1 BL earlier in course which added to 2 UL ECT from week prior to admission  .  At same time Effexor tapered to 75mg daily from 300mg because of concerns about HTN and also concerns that there may have been paradoxical worsening of anxiety .  Abilify was stopped and Seroquel added raised to 200mg at night.  Patient was very Ativan seeking for anxiety but accepted Seroquel 25mg  po prn.   Patient had  as visitor and visits appeared to have gone well.  Patient stated on day of discharge that she had no SI and would be with her  night after discharge with remote PHP in am.      Chronology below:   ---xxx    Patient recently admitted and receive 2 ECT treatments with good effect but decided to leave as she felt better but two days after discharge on 7/30 she awoke feeling depressed again and decided to return to the hospital for more ECT.      8/1: reviewed past recommendations and suggested that Abilify should be discontinued and question efficacy of ECT.  Will review with TEAM and ECT value of continuing ECT vs med management.   8/3: well groomed, anxious, logical, ativan-focused, elevated BP to 170s, compliant to starting Seroquel, medicine team consulted for ECT clearance and high BP, Seroquel started 50mg qhs   ;;08/04: patient will try Seroquel tonight; being cleared for ECT but concerns about high bp but Effexor being lowered to 187.5mg daily.  Abilify has been discontinued.    - 08/08: patient had ECT this morning. No acute events observed. She felt tired after the session. Still endorsing depressed mood and anxiety. Will continue to adjust medications.    - 08/09: patient still very anxious, but reports no headache or dizziness. No acute events. Complains of anhedonia and abulia. Will have another session of ECT tomorrow. Agreed to switch Ativan to Atarax to decrease interaction with ECT.    - 08/10: patient had another session of ECT (the second, bilateral today). It was uneventful. However, she is still very anxious. Medications changes are being made: will decrease Effexor to 112.5mg today and increase Seroquel to 150 mg. Will give Ativan up until 12 hours prior to ECT, as the patient is very anxious.    ;;08/11: anxious but a little less depressed after ECT #2 (BDI improved 10 points  MMS unchanged); accepting Seroquel for anxiety prn.  ECT #3 in am    -08/12: patient is less anxious today, but still asks for reassurance about her treatment and feels insecure. She took Seroquel PRN yesterday and felt sedated. Today she is not demanding PRNs as before. Will continue to monitor response to ECT.    -08/15: patient had a 4th session of ECT today. Anxiety is improving but she is still depressed. Accepting Seroquel PRN and is not demanding benzodiazepines. Will increase bedtime Seroquel to 200 mg (minimum dose for bipolar depression) and decrease Effexor to 75 mg (due to poor response to antidepressants). Will continue to monitor.    -08/16: improved anxiety, showing attachment issued with , describing herself as "too emotional", seems very dependent on . Insecure about treatment. Will perform 5th session of ECT tomorrow. So far, has had minor improvement in depression. Anxiety is improving.

## 2022-08-17 NOTE — ECT TREATMENT NOTE - NSICDXBHPRIMARYDX_PSY_ALL_CORE
Depression, recurrent   F33.9  

## 2022-08-17 NOTE — ECT TREATMENT NOTE - NSECTROSNEGAT_PSY_ALL_CORE
Review of Systems negative/unchanged from previous exam except as noted below
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
## 2022-08-17 NOTE — ECT TREATMENT NOTE - NSECTCPTCODE_PSY_ALL_CORE
12218 (ECT-Electroconvulsive Therapy)
81564 (ECT-Electroconvulsive Therapy)
71236 (ECT-Electroconvulsive Therapy)
98173 (ECT-Electroconvulsive Therapy)
53539 (ECT-Electroconvulsive Therapy)

## 2022-08-17 NOTE — BH INPATIENT PSYCHIATRY DISCHARGE NOTE - NSBHDCRISKMITIGATE_PSY_ALL_CORE
Safety planning/Referral to PHP/Medications targeting suicidality/non-suicidal self injurious behavior

## 2022-08-18 ENCOUNTER — OUTPATIENT (OUTPATIENT)
Dept: OUTPATIENT SERVICES | Facility: HOSPITAL | Age: 67
LOS: 1 days | Discharge: HOME | End: 2022-08-18

## 2022-08-18 PROCEDURE — 90870 ELECTROCONVULSIVE THERAPY: CPT

## 2022-08-18 PROCEDURE — 80061 LIPID PANEL: CPT

## 2022-08-18 PROCEDURE — 36415 COLL VENOUS BLD VENIPUNCTURE: CPT

## 2022-08-18 PROCEDURE — 80307 DRUG TEST PRSMV CHEM ANLYZR: CPT

## 2022-08-18 PROCEDURE — 99285 EMERGENCY DEPT VISIT HI MDM: CPT | Mod: 25

## 2022-08-18 PROCEDURE — 85027 COMPLETE CBC AUTOMATED: CPT

## 2022-08-18 PROCEDURE — 83036 HEMOGLOBIN GLYCOSYLATED A1C: CPT

## 2022-08-18 PROCEDURE — 87635 SARS-COV-2 COVID-19 AMP PRB: CPT

## 2022-08-18 PROCEDURE — 84443 ASSAY THYROID STIM HORMONE: CPT

## 2022-08-18 PROCEDURE — 81025 URINE PREGNANCY TEST: CPT

## 2022-08-18 PROCEDURE — U0003: CPT

## 2022-08-18 PROCEDURE — 85025 COMPLETE CBC W/AUTO DIFF WBC: CPT

## 2022-08-18 PROCEDURE — 85610 PROTHROMBIN TIME: CPT

## 2022-08-18 PROCEDURE — 80053 COMPREHEN METABOLIC PANEL: CPT

## 2022-08-18 PROCEDURE — 85730 THROMBOPLASTIN TIME PARTIAL: CPT

## 2022-08-18 PROCEDURE — U0005: CPT

## 2022-08-18 NOTE — BH SOCIAL WORK CONFIRMATION FOLLOW UP NOTE - NSCOMMENTS_PSY_ALL_CORE
This SW outreached to [facility] Director KASI Peralta (Tel: [phone]; Email: [email or web address]) on 8/18/22 to determine if the pt attended her intake on 8/18/22 at 11AM. He reported that she had some technological issues and was unable to access the [employer] intake link. The pt has been rescheduled for her intake on 8/19/22 at 11AM.    This SW outreached to [facility] PHP Director KASI Peralta (Tel: [phone]; Email: [email or web address]) on 8/18/22 to determine if the pt attended her intake on 8/18/22 at 11AM. He reported she had technological issues. The pt was rescheduled for 8/19/22 at 11AM. Program confirmed she attended.  Caring Call on 8/19: Pt denies SI and has Safety Plan. "Adjusting", but has  as support.

## 2022-08-18 NOTE — BH PSYCHOLOGY - ADMISSION/NEUROPSYCHOLOGY NOTE - NSBHPSYCHOLMSEBEH_PSY_A_CORE
cooperative
During both assessments, she was anxious, agitated and repetitive, asking whether or not the testing was complete after many different tasks. She consistently asked about her next scheduled ECT appointment and was fixated on ECT and on her depression and was often distracted by the thought. She stated that she was tired as the night before there had been too much noise and she was unable to sleep; therefore, she wanted to sleep and asked for breaks periodically throughout the personality assessment. The first half of the personality assessment was administered verbally, and she asked for the second half of the personality assessment to be given to her so that she could read and answer them herself, as she believed it would “go by faster”.  She took a break amidst answering the questions herself. Upon consistent reassurance, Mrs. Friedman was cooperative, pleasant with constricted affect. As some questions increased in difficulty level, she reported awareness of how hard they were for her./cooperative/good eye contact/other (specify)

## 2022-08-18 NOTE — BH PSYCHOLOGY - ADMISSION/NEUROPSYCHOLOGY NOTE - NSBHSUICIDERISKFT_PSY_ALL_CORE
Static: History of depression and ECT treatment  Modifiable: Current depressive and anxious episode  Protective: seeks help, domiciled, support system 
Static risk factors: Past history of passive suicidal ideation, past history of major depressive episodes, separation from , living alone, family history of mental illness  Modifiable: Current diagnoses of MDD, co-morbid diagnoses of borderline and narcissistic personality disorders  Protective factors: Support system includes , jesenia and spirituality, motivation for treatment and to "feel better", lack of substance use
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
## 2022-08-18 NOTE — BH PSYCHOLOGY - ADMISSION/NEUROPSYCHOLOGY NOTE - NSBHPSYCHOLMSETHT_PSY_A_CORE FT
Mrs. Friedman endorses passive suicidal ideation revolving around “not wanting to live like this anymore”, but maintains a desire to live, to “play tennis again” and to feel happier. She denies suicidal intent, plan or attempts.
passive SI

## 2022-08-18 NOTE — BH PSYCHOLOGY - ADMISSION/NEUROPSYCHOLOGY NOTE - NSBHPSYCHOLFAMHX_PSY_A_CORE FT
Mrs. Friedman reported a past psychiatric family history, stating that her parents had mental health concerns and that her mother had needed ECT in the past which had worked for her. She did not elaborate on any further familial history. 
Ms. Friedman reported a past psychiatric family history, stating that her parents had mental health concerns and that her mother had needed ECT in the past which had worked for her. She did not elaborate on any further familial history.

## 2022-08-18 NOTE — BH PSYCHOLOGY - ADMISSION/NEUROPSYCHOLOGY NOTE - NSBHRECOMMENDATIONSFT_PSY_ALL_CORE
1. Whenever Mrs. Friedman receives new information, it should be provided with a narrative.   2. Mrs. Friedman will benefit from hobbies or activities, such as drawing, that might promote visuospatial awareness and memory. This may also aid in her depressive episodes.   3. Upon discharge, referral for comprehensive neuropsychological testing at another time that will be a better presentation of her daily functioning.   4. Recommended psychotherapy at least two times a week.   
RECOMMENDATIONS:  1. Whenever Ms. Friedman receives new information, she should be encouraged to write things down as she benefits from repetition. As her depression remits, her memory for verbal information will hopefully improve.    2. Ms. Friedman’s deficits appear to be related to executive functioning and visual-spatial processing difficulties. An eye-exam to assess vision may be useful, along with therapeutic techniques emphasizing planning and organizational skills.   3. Upon discharge, referral for comprehensive neuropsychological testing is recommended, to get a hyde picture of her cognitive and emotional functioning, and as her depression and anxiety are alleviated by treatment.   4. Recommended psychotherapy at least two times a week, along with group therapy

## 2022-08-18 NOTE — BH PSYCHOLOGY - ADMISSION/NEUROPSYCHOLOGY NOTE - NSBHPSYCHOLPASTMEDS_PSY_A_CORE FT
1)	ARIPiprazole 10 mg oral tablet , 1 tab(s) orally once a day  2)	Venlafaxine 150 mg oral capsule, extended release , 2 cap(s) orally once a day  3)	TraZODone 50 mg oral tablet , 1 tab(s) orally once a day (at bedtime)  
Aripiprazole 10 mg oral tablet, 1 tab(s) orally once a day  2)	Venlafaxine 150 mg oral capsule, extended release, 2 cap(s) orally once a day  3)	Trazodone 50 mg oral tablet, 1 tab(s) orally once a day (at bedtime)  4)	Lorazepam tablet 0.5 mg, oral, 3 times a day, for moderate to severe anxiety,   5)	Quetiapine 50mg, oral, at bedtime for mood/insomnia

## 2022-08-18 NOTE — BH PSYCHOLOGY - ADMISSION/NEUROPSYCHOLOGY NOTE - HPI (INCLUDE ILLNESS QUALITY, SEVERITY, DURATION, TIMING, CONTEXT, MODIFYING FACTORS, ASSOCIATED SIGNS AND SYMPTOMS)
Ms. Friedman reported that she has had previous episodes of major depression but that is has never been “like this”, indicating that this is her worst depressive episode. She stated that she does not understand why and denied any potential triggers for her depressive experiences.   Ms. Friedman reported a past psychiatric family history, stating that her parents had mental health concerns and that her mother had needed ECT in the past which had worked for her. She did not elaborate on any further familial history.     CURRENT EVALUATION:  Behavioral Observations:  Ms. Friedman was administered the GRAY, a lengthy self-report personality assessment that asks respondents about a range of thoughts, feelings and scenarios.  She was also administered the Repeatable Battery for the Assessment of Neuropsychological Status (RBANS), a comprehensive but brief collection of cognitive tasks, along with WTAR (Wechsler Test of Adult Reading) . These tests were administered in person at [facility], GRAY and RBANS on one day, WTAR on another day. During GRAY and RBANS assessments, she was anxious and agitated, as indicated by her repeatedly asking whether or not the testing was complete. She consistently asked about her next scheduled ECT appointment and was focused on ECT and on her depression, which seemed to interfere with her focusing on the tasks at hand.  She reported difficulty sleeping the night before due to the noise in the unit; therefore, she wanted to sleep and asked for breaks periodically throughout the personality assessment. The first half of the personality assessment was administered verbally, and she asked for the second half of the personality assessment to be given to her so that she could read and answer them herself, as she believed it would “go by faster”.  She took a break amidst answering the questions herself. With consistent reassurance, Ms. Friedman was cooperative, pleasant, mood was dysphoric and anxious with constricted affect. As some questions increased in difficulty level, she reported that they were hard for her. Ms. Friedman was more cooperative and engaged with the WTAR.   Tests Administered:  Repeatable Battery for the Assessment of Neuropsychological Status, Updated Version (RBANS UPDATE) Form B – Administered 08/02/22  Personality Assessment Inventory (GRAY) – Administered 08/02/22  Lomas Depression Inventory (BDI-II)- Administered 8/11/2022  Wechsler Test of Adult Reading (WTAR) – Administered 08/12/22  Summary of Test Results:   The testing was conducted in Ms. Friedman’s designated shared inpatient room, given that there was no available private space at the time, with her roommate sleeping in the bed next to her, which Ms. Friedman reported was comfortable for her. However, the testing consisted of breaks, interruptions, and distractions because it occurred on an inpatient psychiatry unit. Moreover, Ms. Friedman reported being tired, wanting to sleep and showed inconsistent effort during the testing. She stated that if she had been “less depressed and less tired, [she] probably would do much better and be faster”. Her performance is considered to be a valid estimation of her current functioning. Her performance was variable, ranging from the borderline to superior ranges of performance, and overall are not consistent with someone of her educational and professional background, and reports of functioning prior to the current hospitalization. Individual tasks are outlined below.  Neuropsychological Functioning:   Ms. Friedman was administered the RBANS-Update, a comprehensive screening that assesses a range of abilities associated with attention, concentration, organization, problem solving, memory and language skills. Her total score was in the 32nd percentile and suggests Average functioning relative to same-aged peers, but there was meaningful variability across task domains. Language: Ms. Friedman’s performance on the language domain, wherein she was asked to recognize and name pictures (Picture Naming) as well as orally list items belonging to a particular category (Semantic Fluency), was in the Average range (45th percentile), indicating language function at the same level relative to peers. In the first task (Picture Naming= 17th to 25th percentile group), Ms. Friedman was able to answer 9 out of the 10 pictures correctly, with the only mistake being she called a sea horse, “shoe horse”. In the second task (Semantic Fluency= 37th percentile), it is notable that Ms. Friedman stated that she had “said that already”, when she had not actually repeated an item prior, thus indicating some impact on memory.   Attention: Ms. Friedman’s auditory and visual motor coordination were variable. She did best on a task of rote repetition, performing well above expectations (Digit Span= 91st percentile, High Average; Coding= 9th percentile. Low Average). In contrast, she performed in the low average range on a paper-and-pencil task that required rapid copying of number-symbol pairs.   Verbal Memory: Ms. Friedman’s performance in the Immediate Memory domain, consisting of two tasks of list learning and story memory, was in the Average range (42nd percentile) overall.   On a list learning task, Ms Friedman’s performance was solidly average.  Her memory for story details immediately after presentation was also average, but perhaps less strong than one would expect given her professional life. Her recall of the same list of words was low-average, demonstrating some difficulty with consolidation and recall; likewise, she made some errors on the recognition trial, although her performance was in the low-end of the average range. Her recall was thus facilitated by verbal cues.   Her memory for story details after a delay was also in the low end of the average range, forgetting several important story details. Her depression may be contributing to difficulties attending to, and consolidating, novel verbal information.      Visual Memory: When asked to draw a complex figure by looking at the figure, Ms. Friedman performed in the low end of the Average range (25th percentile). When asked to redraw the figure from memory after a delay, Ms. Friedman performed in the Borderline range (5th percentile), suggesting that she had trouble retrieving visual information from memory. She approached the task by immediately beginning to draw once instructions were given, limiting time for planning and organizing what parts may be helpful to draw from memory. Likewise, lines were shaky and often crossed out if she had felt she had made a mistake (which often occurred due to drawing without allowing herself time to organize the figure in her mind) but showed resemblance to the original picture. During this task, Ms. Friedman seemed uncertain.   Overall Delayed Memory: Ms. Friedman performed in the Average range amongst all tasks that required delayed memory, recall and recognition (30th percentile).   Overall Visual Spatial Functioning: Ms. Friedman completed a task of visual spatial awareness, where she performed in the Average range. Overall, this domain consists of two tasks, visual copying as described above and orientation to lines wherein she is asked to match an isolated line to a complete picture. She performed in the Low Average to Average range (23rd percentile), indicating that she might struggle with visuospatial tasks. This was her lowest performing domain, compared to other types of tasks.   Intellectual Functioning:   WTAR Predicted WAIS-III and WMS-III 	INDEX SCORE	PERCENTILE   WMS-III 		  Immediate Memory	113	81st percentile  General Memory	115	84th percentile  Working Memory	112	79th percentile  WAIS-III		  Verbal IQ (VIQ)	119	90th percentile  Performance IQ (PIQ)	115	84th percentile  Full Scale IQ (FSIQ)	119	90th percentile  Verbal Comprehension (VCI)	119	90th percentile  Perceptual Organization (POI)	114	82nd percentile  Working Memory (WMI)	115	84th percentile  Processing Speed (PSI)	117	87th percentile   Ms. Friedman was administered the WTAR to assess premorbid functioning by relying on abilities thought to be unaffected by neurological damage. She performed in the Superior range and 95th percentile. The predictions for other intellectual and cognitive tests that are derived from her WTAR score were all within the High Average range. Given her performance on the WTAR, her predicted scores for the WAIS-III and the WMS-III scores were as follows:     Mood:  Ms. Friedman was administered self report depression inventory and scored 42 out of 63, which places her at high levels of depression, at the same time, it is 10 points lower than the BDI that was completed a week ago, as part of her ECT treatment assessment.   Personality Assessment:  Ms. Friedman was administered the Personality Assessment Inventory (GRAY) in order to assess personality functioning. As per the generated report, her response patterns were unusual in that they indicate a defensiveness about particular personal shortcomings as well as an exaggeration of certain problems. While results should be interpreted with caution, the results will be considered valid. Behaviorally, Ms. Friedman was observed to be fatigued throughout the administration of the GRAY, and needed periodic breaks. She became somewhat more dysphoric when attempting to answer the suicide questions, reporting that she hated that she had been thinking about it and would like to feel better.   Ms. Friedman reports a number of difficulties consistent with a significant depressive experience, which was a significantly elevated domain. As per the GRAY report, “she is likely to be plagued by thoughts of worthlessness, hopelessness, and personal failure.” The GRAY also picked up on periodic and transient thoughts of suicide and self-harm, indicating a pessimistic approach to the future and an unhappiness regarding goals. However, it also indicated that she has a strong interest and motivation for treatment, and places an importance on personal change, therapy and personal responsibility. A reasonably good prognosis is indicated.   The self-concept of Ms. Friedman is one that may be harsh, negative and self-critical. Based on GRAY results, she may dwell on past failures and lost opportunities, hold uncertainty for the future and indecision. She may hold self-doubt and can lean towards blaming herself. In addition, Ms. Friedman’s interpersonal style is may be characterized by a passivity. As per the report, “she appears to take a submissive, withdrawn stance when dealing with others; she may feel little interest in or need for interacting with others and she is unlikely to initiate most relationships. Her passivity may be accompanied by some feelings of resentment when others request her cooperation in some matter that she does not fully support. It would be expected that she might attempt to avoid social interactions rather than take certain risks that are implicit in relationships.” Along the same vein, Ms. Friedman indicated that she does not want to be a burden on anyone and is hoping to expand her social support system. She appears to have some people who act as her protective mechanism.   RBANS 	SCALED SCORE	PERCENTILE   Immediate Memory		  List Learning	10	50th percentile  Story Memory	9	37th percentile  Visuospatial		  Figure Copy	8	25th percentile  Line Orientation		26-50th percentile  Language		  Picture Naming		17-25th percentile  Semantic Fluency	9	37th percentile  Attention		  Digit Span	14	91st percentile  Coding	6	9th percentile  Delayed Memory		  List Recall		10-16th percentile group  List Recognition		26-50th percentile group  Story Recall	8	25th percentile  Figure Recall	5	5th percentile

## 2022-08-18 NOTE — BH PSYCHOLOGY - ADMISSION/NEUROPSYCHOLOGY NOTE - SUMMARY
IMPRESSIONS:  Ms. Friedman is a 68yo female with no dependents, domiciled, and employed as a .  She is  from her  for 8 years now, but they live together at times, sharing an apartment in [place]; he also has another residence. She has an [employer] education and has been successfully employed as a  for many years. Prior to her hospitalization, she had been increasingly depressed and anxious, losing interest in activities she previously enjoyed, motivation and energy. This has been exacerbated in the context of cardiac surgery, which she believes her current depression has stemmed from. She had two sessions of ECT treatment  (June 2022), was discharged from [facility], but was voluntarily readmitted (7/31/2022) due to her worsening depressive symptoms. She indicated that she would like more ECT sessions. Staff on the inpatient unit expressed that she could be assessed for cognitive impairment to rule out any other interacting issues in her presentation.   Ms. Friedman was administered neurocognitive tests and a personality test to support further understanding of differential diagnosis and treatment. She was anxious during the testing, indicated by repetitive thoughts, focus on her ECT, and appeared depressed and easily distractable by the noises in the inpatient unit. During the personality assessment, more dysphoric affect was observed when asking suicidal questions. She reported, “the suicide question is hard for me- I hate that I am thinking about suicide”, indicating a conflicting struggle with being as depressed as she is and a desire to feel better with the ECT.   Ms. Friedman’s performance on all tasks indicated that she was bright and still able to be redirected to the task if her attention had been lost by either fatigue, depression, anxiety or her thoughts. Her WTAR scores were high and predicted relatively high scores on the WAIS-III, suggested a predicted high level of premorbid intellectual functioning.  Her performance was variable across domains, indicating different levels of struggle in memory, language, attention and visuospatial functioning, usually uncommon given her level of education and experience. Ms. Friedman did perform worse than same-aged peers on a particular visuospatial task that required executive functioning, visual memory and visual-motor coordination, which can be a relative difficulty for her. Moreover, it is likely she is more able to recall narrative information than words that had been read to her from a list, indicating she may benefit from context. This aligns with her consistent questions of what this neuropsychological testing would be for and how it would benefit her, which made her more likely to engage with it. However, her memory recall and recognition were variable, indicating some difficulty in consolidating novel information.   Any difficulties may be influenced by circumstances and her current co-morbid diagnoses. Depression may have a significant influence over her scores, and she scored in the high range for depressive scores. Depressive symptoms may have significant influence on visual-motor coordination, depression, anxiety, and executive functioning difficulties, contribute to a slower response. Depression scores were high both on GRAY and BDI, and depressive symptoms can impair processing speed, visual memory and executive functioning tasks. Additionally, a family history of clinical depression as well as a recent heart surgery might be included in these determinants and may be vulnerability factors to different facets of Ms. Friedman’s personality functioning. 
Mrs. Friedman is a 68yo female with no dependents, domiciled, and unemployed but working [employer]. She is  from her  for 8 years now. Prior to her hospitalization, she had been increasingly depressed and anxious, losing interest in activities she previously enjoyed, motivation and energy. She had two sessions of ECT treatment, was discharged from [facility], but was voluntarily readmitted due to her worsening depressive symptoms. She indicated that she would like more ECT sessions. Staff on the inpatient unit expressed that she could be assessed for cognitive impairment to rule out any other interacting issues in her presentation.   Mrs. Friedman was administered a neurocognitive test and a personality test to support further understanding of differential diagnosis and treatment. She was agitated and anxious during the testing, indicating repetitive thought, fixation on her ECT, and somewhat distracted by her depression and the voice of doctors on the unit as she had been wanting to talk to one of them. During the personality assessment, more dysphoric affect was observed when asking suicidal questions. She reported, “the suicide question is hard for me- I hate that I am thinking about suicide”, indicating a conflicting struggle with being as depressed as she is and a desire to feel better with the ECT.     Mrs. Friedman’s performance on all tasks indicated that she was bright and still able to be redirected to the task if her attention had been lost by either fatigue, depression or her thoughts. She performed in the Average range on all domains, indicating that there has not been significant impairment in memory, language, attention and visuospatial functioning. However, she did perform worse than same-aged peers on a particular visuospatial task that required both visual memory and visual-motor coordination, which can be a relative difficulty for her. Moreover, it is likely she is more able to recall narrative information that words that had been read to her from a list, indicating she may benefit from context. This aligns with her consistent questions of what this neuropsychological testing would be for and how it would benefit her, which made her more likely to engage with it. Any difficulties may be influenced by circumstances and her current co-morbid diagnoses of MDD, BPD and NPD. Additionally, a family history of clinical depression as well as a recent heart surgery might be included in these determinants.

## 2022-08-18 NOTE — BH PSYCHOLOGY - ADMISSION/NEUROPSYCHOLOGY NOTE - OTHER PAST PSYCHIATRIC HISTORY (INCLUDE DETAILS REGARDING ONSET, COURSE OF ILLNESS, INPATIENT/OUTPATIENT TREATMENT)
Per Mrs. Friedman’s chart and clinical interview, she has a past psychiatric history that includes dx of narcissistic & borderline personality disorder and major depressive disorder. She has a history of 2 inpatient psychiatric hospitalizations and has undergone electroconvulsive therapy (ECT)  most recently at [facility] (07/22/22 - 07/28/22) and previously about 10 years ago. She denied history of suicide attempts or self-harm.   Mrs. Friedman brought herself back to the inpatient unit with persistently depressed mood and significant anxiety, and after being referred by an outpatient psychiatrist for worsening depressive symptoms since her discharge from [facility]. Mrs. Friedman reported that she is awaiting her next ECT session.     Mrs. Friedman reported that she has had previous episodes of major depression but that is has never been “like this”, indicating that this is her worst depressive episode. She stated that she does not understand why, and denied any potential triggers for her depressive experiences.   Contributing factors include recent mitrovalve replacement, and psychosocial factors.

## 2022-08-19 PROCEDURE — 90792 PSYCH DIAG EVAL W/MED SRVCS: CPT

## 2022-08-23 PROCEDURE — 99213 OFFICE O/P EST LOW 20 MIN: CPT

## 2022-08-26 DIAGNOSIS — F60.81 NARCISSISTIC PERSONALITY DISORDER: ICD-10-CM

## 2022-08-26 DIAGNOSIS — G47.00 INSOMNIA, UNSPECIFIED: ICD-10-CM

## 2022-08-26 DIAGNOSIS — F33.3 MAJOR DEPRESSIVE DISORDER, RECURRENT, SEVERE WITH PSYCHOTIC SYMPTOMS: ICD-10-CM

## 2022-08-26 DIAGNOSIS — R45.851 SUICIDAL IDEATIONS: ICD-10-CM

## 2022-08-26 DIAGNOSIS — I10 ESSENTIAL (PRIMARY) HYPERTENSION: ICD-10-CM

## 2022-08-26 DIAGNOSIS — F60.3 BORDERLINE PERSONALITY DISORDER: ICD-10-CM

## 2022-08-26 DIAGNOSIS — R00.0 TACHYCARDIA, UNSPECIFIED: ICD-10-CM

## 2022-08-30 PROCEDURE — 99214 OFFICE O/P EST MOD 30 MIN: CPT

## 2022-09-06 PROCEDURE — 99214 OFFICE O/P EST MOD 30 MIN: CPT

## 2022-09-12 PROCEDURE — 99213 OFFICE O/P EST LOW 20 MIN: CPT

## 2022-09-13 PROCEDURE — 90870 ELECTROCONVULSIVE THERAPY: CPT

## 2022-09-13 PROCEDURE — 80307 DRUG TEST PRSMV CHEM ANLYZR: CPT

## 2022-09-13 PROCEDURE — 80061 LIPID PANEL: CPT

## 2022-09-13 PROCEDURE — 85025 COMPLETE CBC W/AUTO DIFF WBC: CPT

## 2022-09-13 PROCEDURE — 99285 EMERGENCY DEPT VISIT HI MDM: CPT | Mod: 25

## 2022-09-13 PROCEDURE — 36415 COLL VENOUS BLD VENIPUNCTURE: CPT

## 2022-09-13 PROCEDURE — 83036 HEMOGLOBIN GLYCOSYLATED A1C: CPT

## 2022-09-13 PROCEDURE — U0005: CPT

## 2022-09-13 PROCEDURE — 81003 URINALYSIS AUTO W/O SCOPE: CPT

## 2022-09-13 PROCEDURE — 84100 ASSAY OF PHOSPHORUS: CPT

## 2022-09-13 PROCEDURE — U0003: CPT

## 2022-09-13 PROCEDURE — 87635 SARS-COV-2 COVID-19 AMP PRB: CPT

## 2022-09-13 PROCEDURE — 80053 COMPREHEN METABOLIC PANEL: CPT

## 2022-09-13 PROCEDURE — 83735 ASSAY OF MAGNESIUM: CPT

## 2022-09-15 DIAGNOSIS — F33.9 MAJOR DEPRESSIVE DISORDER, RECURRENT, UNSPECIFIED: ICD-10-CM

## 2022-09-15 DIAGNOSIS — Z98.890 OTHER SPECIFIED POSTPROCEDURAL STATES: ICD-10-CM

## 2022-09-19 PROCEDURE — 99214 OFFICE O/P EST MOD 30 MIN: CPT

## 2022-09-28 PROCEDURE — 99213 OFFICE O/P EST LOW 20 MIN: CPT

## 2022-10-10 ENCOUNTER — OUTPATIENT (OUTPATIENT)
Dept: OUTPATIENT SERVICES | Facility: HOSPITAL | Age: 67
LOS: 1 days | Discharge: HOME | End: 2022-10-10
Payer: MEDICARE

## 2022-10-10 DIAGNOSIS — F33.9 MAJOR DEPRESSIVE DISORDER, RECURRENT, UNSPECIFIED: ICD-10-CM

## 2022-10-10 PROCEDURE — 90792 PSYCH DIAG EVAL W/MED SRVCS: CPT | Mod: 95

## 2023-06-16 NOTE — BH INPATIENT PSYCHIATRY PROGRESS NOTE - CURRENT MEDICATION
MEDICATIONS  (STANDING):  QUEtiapine 200 milliGRAM(s) Oral at bedtime  venlafaxine XR 75 milliGRAM(s) Oral daily    MEDICATIONS  (PRN):  ibuprofen  Tablet. 400 milliGRAM(s) Oral every 6 hours PRN Moderate Pain (4 - 6)  polyethylene glycol 3350 17 Gram(s) Oral at bedtime PRN constipation  QUEtiapine 25 milliGRAM(s) Oral three times a day PRN anxiety and mood  traZODone 50 milliGRAM(s) Oral at bedtime PRN insomnia   Yes

## 2023-06-28 NOTE — ED ADULT TRIAGE NOTE - INTERNATIONAL TRAVEL
No Staged Advancement Flap Text: The defect edges were debeveled with a #15 scalpel blade. Given the location of the defect, shape of the defect and the proximity to free margins a staged advancement flap was deemed most appropriate. Using a sterile surgical marker, an appropriate advancement flap was drawn incorporating the defect and placing the expected incisions within the relaxed skin tension lines where possible. The area thus outlined was incised deep to adipose tissue with a #15 scalpel blade. The skin margins were undermined to an appropriate distance in all directions utilizing iris scissors. Following this, the designed flap was carried over into the primary defect and sutured into place.

## 2023-06-30 ENCOUNTER — EMERGENCY (EMERGENCY)
Facility: HOSPITAL | Age: 68
LOS: 1 days | Discharge: HOME | End: 2023-06-30
Attending: EMERGENCY MEDICINE | Admitting: EMERGENCY MEDICINE
Payer: MEDICARE

## 2023-06-30 VITALS
OXYGEN SATURATION: 96 % | RESPIRATION RATE: 18 BRPM | HEART RATE: 98 BPM | DIASTOLIC BLOOD PRESSURE: 100 MMHG | SYSTOLIC BLOOD PRESSURE: 156 MMHG

## 2023-06-30 VITALS
DIASTOLIC BLOOD PRESSURE: 127 MMHG | OXYGEN SATURATION: 97 % | HEART RATE: 97 BPM | TEMPERATURE: 98 F | RESPIRATION RATE: 17 BRPM | HEIGHT: 62 IN | SYSTOLIC BLOOD PRESSURE: 171 MMHG | WEIGHT: 119.49 LBS

## 2023-06-30 DIAGNOSIS — F60.3 BORDERLINE PERSONALITY DISORDER: ICD-10-CM

## 2023-06-30 DIAGNOSIS — F60.81 NARCISSISTIC PERSONALITY DISORDER: ICD-10-CM

## 2023-06-30 DIAGNOSIS — Z95.2 PRESENCE OF PROSTHETIC HEART VALVE: ICD-10-CM

## 2023-06-30 DIAGNOSIS — I10 ESSENTIAL (PRIMARY) HYPERTENSION: ICD-10-CM

## 2023-06-30 DIAGNOSIS — R03.0 ELEVATED BLOOD-PRESSURE READING, WITHOUT DIAGNOSIS OF HYPERTENSION: ICD-10-CM

## 2023-06-30 LAB
ANION GAP SERPL CALC-SCNC: 16 MMOL/L — SIGNIFICANT CHANGE UP (ref 5–17)
APPEARANCE UR: CLEAR — SIGNIFICANT CHANGE UP
BACTERIA # UR AUTO: SIGNIFICANT CHANGE UP /HPF
BASOPHILS # BLD AUTO: 0.06 K/UL — SIGNIFICANT CHANGE UP (ref 0–0.2)
BASOPHILS NFR BLD AUTO: 0.6 % — SIGNIFICANT CHANGE UP (ref 0–2)
BILIRUB UR-MCNC: NEGATIVE — SIGNIFICANT CHANGE UP
BUN SERPL-MCNC: 12 MG/DL — SIGNIFICANT CHANGE UP (ref 7–23)
CALCIUM SERPL-MCNC: 9.9 MG/DL — SIGNIFICANT CHANGE UP (ref 8.4–10.5)
CHLORIDE SERPL-SCNC: 100 MMOL/L — SIGNIFICANT CHANGE UP (ref 96–108)
CO2 SERPL-SCNC: 24 MMOL/L — SIGNIFICANT CHANGE UP (ref 22–31)
COLOR SPEC: YELLOW — SIGNIFICANT CHANGE UP
CREAT SERPL-MCNC: 0.77 MG/DL — SIGNIFICANT CHANGE UP (ref 0.5–1.3)
DIFF PNL FLD: ABNORMAL
EGFR: 84 ML/MIN/1.73M2 — SIGNIFICANT CHANGE UP
EOSINOPHIL # BLD AUTO: 0.09 K/UL — SIGNIFICANT CHANGE UP (ref 0–0.5)
EOSINOPHIL NFR BLD AUTO: 0.9 % — SIGNIFICANT CHANGE UP (ref 0–6)
EPI CELLS # UR: SIGNIFICANT CHANGE UP /HPF (ref 0–5)
GLUCOSE SERPL-MCNC: 98 MG/DL — SIGNIFICANT CHANGE UP (ref 70–99)
GLUCOSE UR QL: NEGATIVE — SIGNIFICANT CHANGE UP
HCT VFR BLD CALC: 43.8 % — SIGNIFICANT CHANGE UP (ref 34.5–45)
HGB BLD-MCNC: 14.4 G/DL — SIGNIFICANT CHANGE UP (ref 11.5–15.5)
IMM GRANULOCYTES NFR BLD AUTO: 0.3 % — SIGNIFICANT CHANGE UP (ref 0–0.9)
KETONES UR-MCNC: NEGATIVE — SIGNIFICANT CHANGE UP
LEUKOCYTE ESTERASE UR-ACNC: NEGATIVE — SIGNIFICANT CHANGE UP
LYMPHOCYTES # BLD AUTO: 2.27 K/UL — SIGNIFICANT CHANGE UP (ref 1–3.3)
LYMPHOCYTES # BLD AUTO: 22.3 % — SIGNIFICANT CHANGE UP (ref 13–44)
MCHC RBC-ENTMCNC: 31 PG — SIGNIFICANT CHANGE UP (ref 27–34)
MCHC RBC-ENTMCNC: 32.9 GM/DL — SIGNIFICANT CHANGE UP (ref 32–36)
MCV RBC AUTO: 94.2 FL — SIGNIFICANT CHANGE UP (ref 80–100)
MONOCYTES # BLD AUTO: 0.96 K/UL — HIGH (ref 0–0.9)
MONOCYTES NFR BLD AUTO: 9.4 % — SIGNIFICANT CHANGE UP (ref 2–14)
NEUTROPHILS # BLD AUTO: 6.77 K/UL — SIGNIFICANT CHANGE UP (ref 1.8–7.4)
NEUTROPHILS NFR BLD AUTO: 66.5 % — SIGNIFICANT CHANGE UP (ref 43–77)
NITRITE UR-MCNC: NEGATIVE — SIGNIFICANT CHANGE UP
NRBC # BLD: 0 /100 WBCS — SIGNIFICANT CHANGE UP (ref 0–0)
PH UR: 7 — SIGNIFICANT CHANGE UP (ref 5–8)
PLATELET # BLD AUTO: 283 K/UL — SIGNIFICANT CHANGE UP (ref 150–400)
POTASSIUM SERPL-MCNC: 4.6 MMOL/L — SIGNIFICANT CHANGE UP (ref 3.5–5.3)
POTASSIUM SERPL-SCNC: 4.6 MMOL/L — SIGNIFICANT CHANGE UP (ref 3.5–5.3)
PROT UR-MCNC: NEGATIVE MG/DL — SIGNIFICANT CHANGE UP
RBC # BLD: 4.65 M/UL — SIGNIFICANT CHANGE UP (ref 3.8–5.2)
RBC # FLD: 13.2 % — SIGNIFICANT CHANGE UP (ref 10.3–14.5)
RBC CASTS # UR COMP ASSIST: < 5 /HPF — SIGNIFICANT CHANGE UP
SODIUM SERPL-SCNC: 140 MMOL/L — SIGNIFICANT CHANGE UP (ref 135–145)
SP GR SPEC: 1.01 — SIGNIFICANT CHANGE UP (ref 1–1.03)
TROPONIN T, HIGH SENSITIVITY RESULT: 9 NG/L — SIGNIFICANT CHANGE UP (ref 0–51)
TROPONIN T, HIGH SENSITIVITY RESULT: <6 NG/L — SIGNIFICANT CHANGE UP (ref 0–51)
UROBILINOGEN FLD QL: 0.2 E.U./DL — SIGNIFICANT CHANGE UP
WBC # BLD: 10.18 K/UL — SIGNIFICANT CHANGE UP (ref 3.8–10.5)
WBC # FLD AUTO: 10.18 K/UL — SIGNIFICANT CHANGE UP (ref 3.8–10.5)
WBC UR QL: < 5 /HPF — SIGNIFICANT CHANGE UP

## 2023-06-30 PROCEDURE — 85025 COMPLETE CBC W/AUTO DIFF WBC: CPT

## 2023-06-30 PROCEDURE — 82962 GLUCOSE BLOOD TEST: CPT

## 2023-06-30 PROCEDURE — 80048 BASIC METABOLIC PNL TOTAL CA: CPT

## 2023-06-30 PROCEDURE — 71045 X-RAY EXAM CHEST 1 VIEW: CPT | Mod: 26

## 2023-06-30 PROCEDURE — 71045 X-RAY EXAM CHEST 1 VIEW: CPT

## 2023-06-30 PROCEDURE — 81001 URINALYSIS AUTO W/SCOPE: CPT

## 2023-06-30 PROCEDURE — 70450 CT HEAD/BRAIN W/O DYE: CPT | Mod: 26,MA

## 2023-06-30 PROCEDURE — 70450 CT HEAD/BRAIN W/O DYE: CPT | Mod: MA

## 2023-06-30 PROCEDURE — 36415 COLL VENOUS BLD VENIPUNCTURE: CPT

## 2023-06-30 PROCEDURE — 84484 ASSAY OF TROPONIN QUANT: CPT

## 2023-06-30 PROCEDURE — 93005 ELECTROCARDIOGRAM TRACING: CPT

## 2023-06-30 PROCEDURE — 99285 EMERGENCY DEPT VISIT HI MDM: CPT | Mod: 25

## 2023-06-30 PROCEDURE — 99285 EMERGENCY DEPT VISIT HI MDM: CPT

## 2023-06-30 RX ORDER — LISINOPRIL 2.5 MG/1
5 TABLET ORAL ONCE
Refills: 0 | Status: COMPLETED | OUTPATIENT
Start: 2023-06-30 | End: 2023-06-30

## 2023-06-30 RX ORDER — LISINOPRIL 2.5 MG/1
2 TABLET ORAL
Qty: 60 | Refills: 0
Start: 2023-06-30 | End: 2023-07-29

## 2023-06-30 RX ADMIN — LISINOPRIL 5 MILLIGRAM(S): 2.5 TABLET ORAL at 19:06

## 2023-06-30 NOTE — ED PROVIDER NOTE - PROGRESS NOTE DETAILS
Disc with Dr. Castellon, initiate on lisinopril, CT head/rpt trop pending, no SI, will if head CT negative, signed out to evening team.

## 2023-06-30 NOTE — ED PROVIDER NOTE - PHYSICAL EXAMINATION
CONSTITUTIONAL: Awake, alert and in no apparent distress.  HEENT: Head is atraumatic. Eyes clear bilaterally, normal EOMI. Airway patent.  CARDIAC: Normal rate, regular rhythm.  Heart sounds S1, S2.   RESPIRATORY: Breath sounds clear and equal bilaterally. no tachypnea, respiratory distress.   GASTROINTESTINAL: Abdomen soft, non-tender, no guarding, distension.  MUSCULOSKELETAL: Spine appears normal, no midline spinal tenderness, range of motion is not limited, no muscle or joint tenderness. no bony tenderness.   NEUROLOGICAL: Alert, no focal deficits, no motor or sensory deficits.  SKIN: Skin normal color for race, warm, dry and intact. No evidence of rash.  PSYCHIATRIC: Normal mood and affect. no apparent risk to self or others.  Patient is alert, oriented x person, place and time.  Cranial nerves 2-12 are intact.  Normal gait and speech.  Cerebellar testing normal:  negative Romberg, normal coordination and normal finger to nose, heal to shin and rapid alternating movements.  Normal proprioception and sensory exam.  No pronator drift.  5/5 bl upper extremity and lower extremity strength.

## 2023-06-30 NOTE — ED ADULT NURSE NOTE - OBJECTIVE STATEMENT
Presents for c/o HTN at home, sent for eval by pcp, also notes intermittent dizziness x 3 months which she is currently experiencing. Denies CP/SOB/weakness/tingling/cough/fevers/known sick contacts.    On assessment- AOx4, breathing even and unlabored on RA, no apparent distress, VSS in triage x htn as documented, able to speak in clear coherent sentences, steady gait unassisted, neuro intact with no apparent facial asymmetry, PERRLA.     Pt upgraded from triage with immediate ED RN and MD team at bedside. Placed on CM, EKG obtained, PIV established with labs sent. Pt remains on CM, resting on stretcher, stable.

## 2023-06-30 NOTE — ED PROVIDER NOTE - CLINICAL SUMMARY MEDICAL DECISION MAKING FREE TEXT BOX
66 yo PMH mitral vernon repair, narcissistic/borderline personality w elevated BP. Pt states she has been feeling dizzy, depressed for many months. Denies chest pain, sob, abd pain, n/v/f/c. Denies associated CP, SOB, lightheaded, diaphoresis, palpitations, cough/rhinorrhea. states not on any medications for BP.  non focal exam, noted hypertensive, anxious, no SI/HI  asymptomatic htn, dizziness for many days, not within 24 hours, given poor historian will CT head, labs, reassess.

## 2023-06-30 NOTE — ED PROVIDER NOTE - PATIENT PORTAL LINK FT
You can access the FollowMyHealth Patient Portal offered by [facility] by registering at the following website: [email or web address]. By joining [facility]’s FollowMyHealth portal, you will also be able to view your health information using other applications (apps) compatible with our system.

## 2023-06-30 NOTE — ED ADULT NURSE NOTE - NSFALLUNIVINTERV_ED_ALL_ED
Bed/Stretcher in lowest position, wheels locked, appropriate side rails in place/Call bell, personal items and telephone in reach/Instruct patient to call for assistance before getting out of bed/chair/stretcher/Non-slip footwear applied when patient is off stretcher/[place] to call system/Physically safe environment - no spills, clutter or unnecessary equipment/Purposeful proactive rounding/Room/bathroom lighting operational, light cord in reach

## 2023-06-30 NOTE — ED PROVIDER NOTE - OBJECTIVE STATEMENT
66 yo PMH mitral vernon repair, narcissistic/borderline personality w elevated BP. Pt states she has been feeling dizzy, depressed for many months. Denies chest pain, sob, abd pain, n/v/f/c. Denies associated CP, SOB, lightheaded, diaphoresis, palpitations, cough/rhinorrhea. states not on any medications for BP.

## 2023-06-30 NOTE — ED ADULT TRIAGE NOTE - CHIEF COMPLAINT QUOTE
Pt sent by MD Castellon for elevated BP. Pt reports dizziness upon waking up this morning. Denies headache, cp, sob. PMH depression. EKG in progress, upgraded to MD Solano.

## 2023-06-30 NOTE — ED ADULT NURSE NOTE - NS ED NURSE DC INFO COMPLEXITY
Olumiant Pregnancy And Lactation Text: Based on animal studies, Olumiant may cause embryo-fetal harm when administered to pregnant women.  The medication should not be used in pregnancy.  Breastfeeding is not recommended during treatment. Simple: Patient demonstrates quick and easy understanding/Verbalized Understanding

## 2023-07-01 ENCOUNTER — EMERGENCY (EMERGENCY)
Facility: HOSPITAL | Age: 68
LOS: 1 days | Discharge: HOME | End: 2023-07-01
Attending: EMERGENCY MEDICINE | Admitting: EMERGENCY MEDICINE
Payer: MEDICARE

## 2023-07-01 VITALS
HEART RATE: 97 BPM | OXYGEN SATURATION: 97 % | TEMPERATURE: 98 F | RESPIRATION RATE: 17 BRPM | HEIGHT: 62 IN | DIASTOLIC BLOOD PRESSURE: 89 MMHG | WEIGHT: 119.05 LBS | SYSTOLIC BLOOD PRESSURE: 150 MMHG

## 2023-07-01 VITALS
SYSTOLIC BLOOD PRESSURE: 155 MMHG | DIASTOLIC BLOOD PRESSURE: 97 MMHG | RESPIRATION RATE: 18 BRPM | TEMPERATURE: 98 F | HEART RATE: 80 BPM | OXYGEN SATURATION: 98 %

## 2023-07-01 DIAGNOSIS — F33.1 MAJOR DEPRESSIVE DISORDER, RECURRENT, MODERATE: ICD-10-CM

## 2023-07-01 DIAGNOSIS — F60.3 BORDERLINE PERSONALITY DISORDER: ICD-10-CM

## 2023-07-01 DIAGNOSIS — R00.2 PALPITATIONS: ICD-10-CM

## 2023-07-01 DIAGNOSIS — R45.851 SUICIDAL IDEATIONS: ICD-10-CM

## 2023-07-01 DIAGNOSIS — Z95.2 PRESENCE OF PROSTHETIC HEART VALVE: ICD-10-CM

## 2023-07-01 LAB
ALBUMIN SERPL ELPH-MCNC: 4.5 G/DL — SIGNIFICANT CHANGE UP (ref 3.3–5)
ALP SERPL-CCNC: 77 U/L — SIGNIFICANT CHANGE UP (ref 40–120)
ALT FLD-CCNC: 23 U/L — SIGNIFICANT CHANGE UP (ref 10–45)
AMPHET UR-MCNC: NEGATIVE — SIGNIFICANT CHANGE UP
ANION GAP SERPL CALC-SCNC: 12 MMOL/L — SIGNIFICANT CHANGE UP (ref 5–17)
ANISOCYTOSIS BLD QL: SLIGHT — SIGNIFICANT CHANGE UP
APAP SERPL-MCNC: 7 UG/ML — LOW (ref 10–30)
APPEARANCE UR: CLEAR — SIGNIFICANT CHANGE UP
APTT BLD: 21.3 SEC — LOW (ref 27.5–35.5)
AST SERPL-CCNC: 18 U/L — SIGNIFICANT CHANGE UP (ref 10–40)
BACTERIA # UR AUTO: SIGNIFICANT CHANGE UP /HPF
BARBITURATES UR SCN-MCNC: NEGATIVE — SIGNIFICANT CHANGE UP
BASOPHILS # BLD AUTO: 0 K/UL — SIGNIFICANT CHANGE UP (ref 0–0.2)
BASOPHILS NFR BLD AUTO: 0 % — SIGNIFICANT CHANGE UP (ref 0–2)
BENZODIAZ UR-MCNC: NEGATIVE — SIGNIFICANT CHANGE UP
BILIRUB SERPL-MCNC: 0.3 MG/DL — SIGNIFICANT CHANGE UP (ref 0.2–1.2)
BILIRUB UR-MCNC: NEGATIVE — SIGNIFICANT CHANGE UP
BUN SERPL-MCNC: 20 MG/DL — SIGNIFICANT CHANGE UP (ref 7–23)
CALCIUM SERPL-MCNC: 9.9 MG/DL — SIGNIFICANT CHANGE UP (ref 8.4–10.5)
CHLORIDE SERPL-SCNC: 96 MMOL/L — SIGNIFICANT CHANGE UP (ref 96–108)
CO2 SERPL-SCNC: 24 MMOL/L — SIGNIFICANT CHANGE UP (ref 22–31)
COCAINE METAB.OTHER UR-MCNC: NEGATIVE — SIGNIFICANT CHANGE UP
COD CRY URNS QL: ABNORMAL /HPF
COLOR SPEC: YELLOW — SIGNIFICANT CHANGE UP
CREAT SERPL-MCNC: 1.04 MG/DL — SIGNIFICANT CHANGE UP (ref 0.5–1.3)
DIFF PNL FLD: ABNORMAL
EGFR: 59 ML/MIN/1.73M2 — LOW
EOSINOPHIL # BLD AUTO: 0 K/UL — SIGNIFICANT CHANGE UP (ref 0–0.5)
EOSINOPHIL NFR BLD AUTO: 0 % — SIGNIFICANT CHANGE UP (ref 0–6)
EPI CELLS # UR: SIGNIFICANT CHANGE UP /HPF (ref 0–5)
ETHANOL SERPL-MCNC: <10 MG/DL — SIGNIFICANT CHANGE UP (ref 0–10)
GLUCOSE SERPL-MCNC: 101 MG/DL — HIGH (ref 70–99)
GLUCOSE UR QL: NEGATIVE — SIGNIFICANT CHANGE UP
HCT VFR BLD CALC: 45.6 % — HIGH (ref 34.5–45)
HGB BLD-MCNC: 15.1 G/DL — SIGNIFICANT CHANGE UP (ref 11.5–15.5)
INR BLD: 0.96 — SIGNIFICANT CHANGE UP (ref 0.88–1.16)
KETONES UR-MCNC: NEGATIVE — SIGNIFICANT CHANGE UP
LEUKOCYTE ESTERASE UR-ACNC: ABNORMAL
LYMPHOCYTES # BLD AUTO: 2.85 K/UL — SIGNIFICANT CHANGE UP (ref 1–3.3)
LYMPHOCYTES # BLD AUTO: 25.2 % — SIGNIFICANT CHANGE UP (ref 13–44)
MAGNESIUM SERPL-MCNC: 2.3 MG/DL — SIGNIFICANT CHANGE UP (ref 1.6–2.6)
MANUAL SMEAR VERIFICATION: SIGNIFICANT CHANGE UP
MCHC RBC-ENTMCNC: 30.8 PG — SIGNIFICANT CHANGE UP (ref 27–34)
MCHC RBC-ENTMCNC: 33.1 GM/DL — SIGNIFICANT CHANGE UP (ref 32–36)
MCV RBC AUTO: 92.9 FL — SIGNIFICANT CHANGE UP (ref 80–100)
METHADONE UR-MCNC: NEGATIVE — SIGNIFICANT CHANGE UP
MONOCYTES # BLD AUTO: 1.38 K/UL — HIGH (ref 0–0.9)
MONOCYTES NFR BLD AUTO: 12.2 % — SIGNIFICANT CHANGE UP (ref 2–14)
NEUTROPHILS # BLD AUTO: 6.89 K/UL — SIGNIFICANT CHANGE UP (ref 1.8–7.4)
NEUTROPHILS NFR BLD AUTO: 60.9 % — SIGNIFICANT CHANGE UP (ref 43–77)
NITRITE UR-MCNC: NEGATIVE — SIGNIFICANT CHANGE UP
OPIATES UR-MCNC: NEGATIVE — SIGNIFICANT CHANGE UP
OVALOCYTES BLD QL SMEAR: SLIGHT — SIGNIFICANT CHANGE UP
PCP SPEC-MCNC: SIGNIFICANT CHANGE UP
PCP UR-MCNC: NEGATIVE — SIGNIFICANT CHANGE UP
PH UR: 6 — SIGNIFICANT CHANGE UP (ref 5–8)
PHOSPHATE SERPL-MCNC: 3.9 MG/DL — SIGNIFICANT CHANGE UP (ref 2.5–4.5)
PLAT MORPH BLD: NORMAL — SIGNIFICANT CHANGE UP
PLATELET # BLD AUTO: 278 K/UL — SIGNIFICANT CHANGE UP (ref 150–400)
POIKILOCYTOSIS BLD QL AUTO: SLIGHT — SIGNIFICANT CHANGE UP
POLYCHROMASIA BLD QL SMEAR: SLIGHT — SIGNIFICANT CHANGE UP
POTASSIUM SERPL-MCNC: 4.4 MMOL/L — SIGNIFICANT CHANGE UP (ref 3.5–5.3)
POTASSIUM SERPL-SCNC: 4.4 MMOL/L — SIGNIFICANT CHANGE UP (ref 3.5–5.3)
PROT SERPL-MCNC: 7.4 G/DL — SIGNIFICANT CHANGE UP (ref 6–8.3)
PROT UR-MCNC: NEGATIVE MG/DL — SIGNIFICANT CHANGE UP
PROTHROM AB SERPL-ACNC: 11.4 SEC — SIGNIFICANT CHANGE UP (ref 10.5–13.4)
RBC # BLD: 4.91 M/UL — SIGNIFICANT CHANGE UP (ref 3.8–5.2)
RBC # FLD: 13.3 % — SIGNIFICANT CHANGE UP (ref 10.3–14.5)
RBC BLD AUTO: ABNORMAL
RBC CASTS # UR COMP ASSIST: ABNORMAL /HPF
SALICYLATES SERPL-MCNC: <0.3 MG/DL — LOW (ref 2.8–20)
SODIUM SERPL-SCNC: 132 MMOL/L — LOW (ref 135–145)
SP GR SPEC: 1.01 — SIGNIFICANT CHANGE UP (ref 1–1.03)
THC UR QL: NEGATIVE — SIGNIFICANT CHANGE UP
TSH SERPL-MCNC: 1.38 UIU/ML — SIGNIFICANT CHANGE UP (ref 0.27–4.2)
UROBILINOGEN FLD QL: 0.2 E.U./DL — SIGNIFICANT CHANGE UP
VARIANT LYMPHS # BLD: 1.7 % — SIGNIFICANT CHANGE UP (ref 0–6)
WBC # BLD: 11.31 K/UL — HIGH (ref 3.8–10.5)
WBC # FLD AUTO: 11.31 K/UL — HIGH (ref 3.8–10.5)
WBC UR QL: < 5 /HPF — SIGNIFICANT CHANGE UP

## 2023-07-01 PROCEDURE — 80307 DRUG TEST PRSMV CHEM ANLYZR: CPT

## 2023-07-01 PROCEDURE — 80053 COMPREHEN METABOLIC PANEL: CPT

## 2023-07-01 PROCEDURE — 93005 ELECTROCARDIOGRAM TRACING: CPT

## 2023-07-01 PROCEDURE — 85730 THROMBOPLASTIN TIME PARTIAL: CPT

## 2023-07-01 PROCEDURE — 90792 PSYCH DIAG EVAL W/MED SRVCS: CPT

## 2023-07-01 PROCEDURE — 85025 COMPLETE CBC W/AUTO DIFF WBC: CPT

## 2023-07-01 PROCEDURE — 99284 EMERGENCY DEPT VISIT MOD MDM: CPT

## 2023-07-01 PROCEDURE — 85610 PROTHROMBIN TIME: CPT

## 2023-07-01 PROCEDURE — 87086 URINE CULTURE/COLONY COUNT: CPT

## 2023-07-01 PROCEDURE — 99285 EMERGENCY DEPT VISIT HI MDM: CPT

## 2023-07-01 PROCEDURE — 81001 URINALYSIS AUTO W/SCOPE: CPT

## 2023-07-01 PROCEDURE — 83735 ASSAY OF MAGNESIUM: CPT

## 2023-07-01 PROCEDURE — 84100 ASSAY OF PHOSPHORUS: CPT

## 2023-07-01 PROCEDURE — 36415 COLL VENOUS BLD VENIPUNCTURE: CPT

## 2023-07-01 PROCEDURE — 84443 ASSAY THYROID STIM HORMONE: CPT

## 2023-07-01 RX ORDER — ACETAMINOPHEN 500 MG
650 TABLET ORAL ONCE
Refills: 0 | Status: COMPLETED | OUTPATIENT
Start: 2023-07-01 | End: 2023-07-01

## 2023-07-01 RX ADMIN — Medication 650 MILLIGRAM(S): at 15:43

## 2023-07-01 RX ADMIN — Medication 650 MILLIGRAM(S): at 14:43

## 2023-07-01 NOTE — ED BEHAVIORAL HEALTH ASSESSMENT NOTE - SUMMARY
67 year old female,  from spouse, unemployed, domiciled with , with past medical history of surgically repaired MVP in March 2022 and past psychiatric history of narcissistic & borderline personality disorders, MDD, history of 3-4 inpatient psychiatric hospitalizations & ECT most recently at discharged from [facility] psychiatric unit, currently attending day program, no history of suicide attempts or self-harm, BIB self earlier today c/o weakness. Pt briefly endorsed SI when told she was not going to be admitted to medicine. She then retracted SI but requested to speak with psychiatrist.  Pt is inconsistent historian. She presents with what appears to be chronic depressed mood and intermittent chronic SI. Given chronicity of pt's symptoms, prominent character pathology, pt will unlikely benefit from inpatient treatment. Pt will be accompanied to home by  who feels safe taking pt home and stated he will bring pt back if there are any safety concerns.   Pt remains at chronic elevated risk for self harm. She would benefit most form DBT treatment/intensive day program which she is currently attending.   Pt will follow up with outpatient psychiatrist in 2 days.  and pt were advised to call 911 or present to the ED if pt were to feel suicidal.

## 2023-07-01 NOTE — ED PROVIDER NOTE - QRS
"PROBLEM LIST:    1.  Normocytic anemia likely secondary to age versus low-grade MDS  2.  Esophageal stricture recently underwent dilation - Schatzki's ring      REASON FOR VISIT: Normocytic anemia    HISTORY OF PRESENT ILLNESS:   81-year-old gentleman with normocytic anemia returns today for follow-up.  He reports that he is able to eat much better since he had dilation of his esophagus done recently.  Clinically feels much better than a few weeks ago when he is having trouble eating and swallowing.  He reports that his procedure did not cause significant discomfort either.  Otherwise no other issues ongoing at this time.    Past medical history, social history and family history was reviewed and unchanged from prior visit except for esophageal stricture.    Review of Systems:    Review of Systems   Constitutional: Negative for appetite change, fatigue and unexpected weight change.   HENT:   Negative for trouble swallowing.    Eyes: Negative.    Respiratory: Negative for shortness of breath.    Cardiovascular: Negative for chest pain and leg swelling.   Gastrointestinal: Negative.    Endocrine: Negative.    Genitourinary: Negative.     Musculoskeletal: Negative.    Skin: Negative.    Neurological: Negative.    Hematological: Negative.    Psychiatric/Behavioral: Negative.             Medications:  The current medication list was reviewed in the EMR    ALLERGIES:    Allergies   Allergen Reactions   • Penicillins Swelling   • Sulfa Antibiotics Swelling         Physical Exam    VITAL SIGNS:  /70  Pulse 70  Temp 99.4 °F (37.4 °C)  Resp 16  Ht 71.5\" (181.6 cm)  Wt 152 lb (68.9 kg)  BMI 20.9 kg/m2     Performance Status: 0    General: well appearing, in no acute distress  HEENT: sclera anicteric, oropharynx clear, neck is supple  Lymphatics: no cervical, supraclavicular, or axillary adenopathy  Extremities: no lower extremity edema  Skin: no rashes, lesions, bruising, or petechiae  Msk:  Shows no weakness of " the large muscle groups  Psych: Mood is stable    Hgb 11.4 WBC 9.5   Testosterone 214    Assessment/Plan    1. Normocytic Anemia:   Stable Hgb.  Doing well otherwise.  Low Testosterone is likely the cause.  No intervention necessary at this time.    Spent 15 minutes on the patient's plan and care with 10 minutes spent counseling the patient.      Delgado Larios MD  [facility]ematology [facility]    10/11/2017         Please note that portions of this note may have been completed with a voice recognition program. Efforts were made to edit the dictations, but occasionally words are mistranscribed.            92

## 2023-07-01 NOTE — ED ADULT NURSE NOTE - NSFALLHARMRISKINTERV_ED_ALL_ED

## 2023-07-01 NOTE — ED PROVIDER NOTE - PATIENT PORTAL LINK FT
You can access the FollowMyHealth Patient Portal offered by [facility] by registering at the following website: [email or web address]. By joining [employer]’s FollowMyHealth portal, you will also be able to view your health information using other applications (apps) compatible with our system.

## 2023-07-01 NOTE — ED ADULT NURSE NOTE - OBJECTIVE STATEMENT
67 y.o. Female c/o depression, anxiety x few weeks. Pt states "I have been depressed. I want to be able to go home." Endorsed SI to Triage RN, denies SI to primary RN. Denies plan, HI, /VH. Pt states she was admitted at [facility] for psychiatric evaluation/depression in 2022. PMHx anxiety, depression, mitral valve repair. 1:1 initiated, patient in gown, belongings collected and wanded by security. Environment checked for all ligature risks and safety hazards utilizing environmental safety checklist.

## 2023-07-01 NOTE — ED ADULT NURSE REASSESSMENT NOTE - NS ED NURSE REASSESS COMMENT FT1
1:1 maintained. Pt awake and alert cooperative. Environment checked for all ligature risks and safety hazards utilizing environmental safety checklist.

## 2023-07-01 NOTE — ED BEHAVIORAL HEALTH ASSESSMENT NOTE - DESCRIPTION
pt was inconsistent with her history MERLYN; reports she underwent cardiac surgery in March 2022 due to worsening of baseline mitral valve prolapse; takes no medications besides psychiatric meds currently lives with

## 2023-07-01 NOTE — ED BEHAVIORAL HEALTH ASSESSMENT NOTE - DETAILS
Refer to psychiatric history above [facility] (07/22/22 - 07/28/22) Reports mother received ECT discussed plan with ED attending Dr. Chuckie Lorenzana reports she had intrusive thoughts of suicide without intent or planning around ten years ago and states that if she continues to experience her depressive symptoms at this severity that she might take her life. call 911 or present to the nearest ED weakness

## 2023-07-01 NOTE — ED ADULT TRIAGE NOTE - CHIEF COMPLAINT QUOTE
Pt c/o depression, anxiety, SI. Pt reports history of depression. Denies plan, HI, AH/VH, alcohol/drug use. 1:1 initiated, pt changed and wanded, belongings secured. EKG in progress.

## 2023-07-01 NOTE — ED ADULT NURSE NOTE - CAS DISCH TRANSFER METHOD
Render In Strict Bullet Format?: No
Detail Level: Zone
Continue Regimen: Clobetasol shampoo QW PRN
Private car

## 2023-07-01 NOTE — ED ADULT NURSE REASSESSMENT NOTE - NS ED NURSE REASSESS COMMENT FT1
cleared for d/c by emergency doc and psychiatric team. pt returned belongings. VSS. safety maintained, all needs met. spouse accompanying patient home.

## 2023-07-01 NOTE — ED ADULT NURSE REASSESSMENT NOTE - NS ED NURSE REASSESS COMMENT FT1
1:1 initiated, patient in gown, belongings collected and wanded by security. Environment checked for all ligature risks and safety hazards utilizing environmental safety checklist.

## 2023-07-01 NOTE — ED PROVIDER NOTE - OBJECTIVE STATEMENT
66 yo with History of depression, history of mitral valve repair in remote past, here yesterday with palpitations and depression, reassuring medical work-up at that time and was discharged also had a head CT, which was within normal limits with exception of age-related changes, and UA was negative and tox screen was negative at that time.  Patient reports she was given a new blood pressure medication and was here picking it up, medication was lisinopril, reports she felt recurrent palpitation and also felt suicidal so she came in to get reevaluated.  Patient reports she was admitted for suicidal thoughts and [place] 3 weeks ago and discharged to an outpatient program, has been going daily on weekdays half day, she reports it helps, does not feel suicidal right now but felt suicidal right before arrival and requests talking to a psychiatrist.  No chest pain, no headache, no shortness of breath, low back pain which is chronic and unchanged.

## 2023-07-01 NOTE — ED BEHAVIORAL HEALTH ASSESSMENT NOTE - HPI (INCLUDE ILLNESS QUALITY, SEVERITY, DURATION, TIMING, CONTEXT, MODIFYING FACTORS, ASSOCIATED SIGNS AND SYMPTOMS)
67 year old female,  from spouse, unemployed, domiciled with , with past medical history of surgically repaired MVP in March 2022 and past psychiatric history of narcissistic & borderline personality disorders, MDD, history of 3-4 inpatient psychiatric hospitalizations & ECT most recently at discharged from [facility] IP psychiatric unit, currently attending day program, no history of suicide attempts or self-harm, BIB self earlier today c/o weakness. Pt briefly endorsed SI when told she was not going to be admitted to medicine. She then retracted SI but requested to speak with psychiatrist.     On evaluation, the patient is anxious, well-related, demonstrating good behavioral control and linear thought processes.  The patient is noted to be inconsistent with her history. She states that she was recently discharged from [facility] intpatient psychiatric unit after a month stay and now has been attending outpatient day program. Pt states that she came to the ED yesterday feeling weak, had to come back to  her BP medication at [facility] and decided to come back to the ED as she continued to feel weak. Pt is vague when questioned about SI. Initially stated she did not have SI. When this writer started discussing outpatient follow up pt stated that she did have SI. This statement appeared to be in response to this writer suggesting that inpatient psychiatric admission was not going to be indicated. Pt stated that she has been feeling depressed (chronic as per ) and recently increased dose of Wellbutrin to 450 mg from 300 mg. Pt was perseverative on being unsteady on her feed. However she was noted to be ambulating around the ED with no difficulty. There was no evidence of AVH/PI/HI. Pt's interaction with this writer was highly suggestive of character pathology.  was supportive of pt going home and he stated he will be able to remain at pt's side at all times.   Pt agreed to speak with her treating psychiatrist on Monday and to return to the ED if feeling unsafe.   Pt initially endorsed having a SA one month ago. As per  pt took 5 tabs of Ativan.  states that pt has received ECT in the past. However she was not compliant with recommended frequency, possibly effecting lack of response.     Pt's

## 2023-07-01 NOTE — ED BEHAVIORAL HEALTH ASSESSMENT NOTE - NSSUICPROTFACT_PSY_ALL_CORE
Has been engaged in psychiatric outpatient care for years/Responsibility to children, family, or others/Identifies reasons for living/Supportive social network of family or friends/Positive therapeutic relationships/Zoroastrian beliefs

## 2023-07-01 NOTE — ED BEHAVIORAL HEALTH ASSESSMENT NOTE - OTHER PAST PSYCHIATRIC HISTORY (INCLUDE DETAILS REGARDING ONSET, COURSE OF ILLNESS, INPATIENT/OUTPATIENT TREATMENT)
Pt is currently attending day program at [facility]    -Reports she has been dx with MDD, narcissistic personality disorder, and borderline personality disorder.   -Reports history of two inpatient psychiatric hospitalizations:  First inpatient psychiatric hospitalization was ~10 years ago, at which time she received ECT and most recently at [facility] (07/22/22 - 07/28/22) when she received two ECT sessions and discharged with outpatient follow-up and plan to continue ECT outpatient.      Medications on discharge:  ARIPiprazole 10 mg oral tablet: 1 tab(s) orally once a day  traZODone 50 mg oral tablet: 1 tab(s) orally once a day (at bedtime)  venlafaxine 150 mg oral capsule, extended release: 2 cap(s) orally once a day  -Denies history of suicide attempts.   -Denies history of suicidal ideations but states that she would have intrusive thoughts of [place] off of the highway around the time of her inpatient psychiatric hospitalization ~10 years ago (didn't act or felt compelled to act).    -Reports h/o psychotropic trials including current meds of venlafaxine/abilify/trazodone/lorazepam (refer to HPI for details regarding those meds). Reports history of tx w/ brexpiprazole which had activating and unpleasant side effects. Reports history of other psychotropic agents around the time of her hospitalziation / ECT ~10 years ago but doesn't remember their names. Reports history of side effects to those agents at that time but doesn't recall details at the time of this writer's psychiatric evaluation.

## 2023-07-01 NOTE — ED PROVIDER NOTE - CLINICAL SUMMARY MEDICAL DECISION MAKING FREE TEXT BOX
problem 1 :  palpitations, patient with reassuring EKG with no dysrhythmia, no metabolic abnormalities apparent, I suspect possibly secondary to anxiety, advised patient if palpitations continue to follow-up with cardiology for Holter monitor.  Problem 2: Depression and suicidal thoughts, consulted psychiatry for further assessment, on my evaluation patient does not seem a danger to herself or others, suicidal thoughts were transient with no plan and no active thoughts in ER, and patient has a outpatient intensive program with very close follow-up again on Monday.  Dispo pending further input from psychiatry.  If cleared by psychiatry plan for discharge. problem 1 :  palpitations , patient with reassuring EKG with no dysrhythmia, no metabolic abnormalities apparent, I suspect possibly secondary to anxiety, advised patient if palpitations continue to follow-up with cardiology for Holter monitor.  Problem 2: Depression and suicidal thoughts, consulted psychiatry for further assessment, on my evaluation patient does not seem a danger to herself or others, suicidal thoughts were transient with no plan and no active thoughts in ER, and patient has a outpatient intensive program with very close follow-up again on Monday.  Dispo pending further input from psychiatry.  If cleared by psychiatry plan for discharge .

## 2023-07-01 NOTE — ED PROVIDER NOTE - NSFOLLOWUPINSTRUCTIONS_ED_ALL_ED_FT
Follow up with your pcp regarding palpitations and your high blood pressure    Palpitations    A palpitation is the feeling that your heartbeat is irregular or is faster than normal. It may feel like your heart is fluttering or skipping a beat. They may be caused by many things, including smoking, caffeine, alcohol, stress, and certain medicines. Although most causes of palpitations are not serious, palpitations can be a sign of a serious medical problem. Avoid caffeine, alcohol, and tobacco products at home. Try to reduce stress and anxiety and make sure to get plenty of rest.     SEEK IMMEDIATE MEDICAL CARE IF YOU HAVE ANY OF THE FOLLOWING SYMPTOMS: chest pain, shortness of breath, severe headache, dizziness/lightheadedness, or fainting.    Follow up with your outpatient program and your psychiatrist for continued depression management.

## 2023-07-01 NOTE — ED ADULT NURSE REASSESSMENT NOTE - NS ED NURSE REASSESS COMMENT FT1
1:1 maintained. Pt awake and alert resting in recliner. Environment checked for all ligature risks and safety hazards utilizing environmental safety checklist.

## 2023-07-01 NOTE — ED BEHAVIORAL HEALTH ASSESSMENT NOTE - REASON FOR REFERRAL
Pt initially presented c/o weakness, then briefly endorsed SI, then retracted SI but requested to speak with psychiatrist.

## 2023-07-02 DIAGNOSIS — F33.1 MAJOR DEPRESSIVE DISORDER, RECURRENT, MODERATE: ICD-10-CM

## 2023-07-02 DIAGNOSIS — F60.3 BORDERLINE PERSONALITY DISORDER: ICD-10-CM

## 2023-07-02 LAB
CULTURE RESULTS: SIGNIFICANT CHANGE UP
SPECIMEN SOURCE: SIGNIFICANT CHANGE UP

## 2023-11-14 NOTE — ED ADULT NURSE NOTE - OBJECTIVE STATEMENT
Nick Erickson III  : 1958  Primary: [facility] ([facility])  Secondary:   [place] [address] [facility]  [address] [place] [id number]  Phone: [phone]  Fax: [phone] Plan Frequency: 2x/wk for 90 days    Plan of Care/Certification Expiration Date: 24      >PT Visit Info:   Plan Frequency: 2x/wk for 90 days  Plan of Care/Certification Expiration Date: 24  Total # of Visits Approved: 30  Total # of Visits to Date: 9  Progress Note Counter: 9      Visit Count:  Visit count could not be calculated. Make sure you are using a visit which is associated with an episode.     OUTPATIENT PHYSICAL THERAPY:OP NOTE TYPE: OP Note Type: Treatment Note 2023       Episode  }Appt Desk             Treatment Diagnosis:    Visit Diagnoses           Codes     Pain in left ankle and joints of left foot    -  Primary M25.572     Difficulty in walking, not elsewhere classified     R26.2     Stiffness of left foot, not elsewhere classified     M25.675        Medical/Referring Diagnosis:  Left foot pain [M79.672]  Hammer toe of left foot [M20.42]  Acquired claw toe of left foot [M20.5X2]  Referring Physician:  Chela Lopez APRN - CNP  MD Orders:  PT Eval and Treat   Date of Onset:  L Foot Surgery 23 and 23  Allergies:   Fenofibrate micronized and Statins  Restrictions/Precautions:  No data recordedNo data recorded     Interventions Planned (Treatment may consist of any combination of the following):    Current Treatment Recommendations: Strengthening; ROM; Balance training; Gait training; Stair training; Manual; Pain management; Home exercise program; Patient/Caregiver education & training; Modalities       >Subjective Comments: \"I have done 4 laser treatements\"     >Initial:   5   /10>Post Session:   5   /10  Medications Last Reviewed:  2023  Updated Objective Findings:   see below    10/19/23: Pt went to MD since here last. MD said spot on toe Pt presented to er with c/o depression and SI. Pt states had a recent admission for depression, was dc'd, now is back with worsening s/s. Pt's  psychiatrics referred for admission. PMH of mitral valve repair. Pt denies HI, hallucinations, drug and alcohol use. Pt A&Ox4, ambulatory with steady gait, speaking in clear/full sentences, vital signs stable.

## 2023-12-07 NOTE — ED ADULT NURSE NOTE - CAS TRG GENERAL AIRWAY, MLM
Patent
Cystoscopy, left ureteroscopy, soltive laser, stent insertion w/ fluroscopy on 12/21/23.  preop cbc, bmp, UC sent

## 2023-12-31 NOTE — ED BEHAVIORAL HEALTH ASSESSMENT NOTE - KNOWN PSYCHIATRIC ADMISSION WITHIN THE PAST 30 DAYS
Florian Desai  Podiatric Medicine  [address] [place] [id number]  Phone: [phone]  Fax: [phone]  Follow Up Time: 1 week   Yes Florian Desai  Podiatric Medicine  [address] [place] [id number]  Phone: [phone]  Fax: [phone]  Follow Up Time: 1 week

## 2024-04-02 NOTE — BH TREATMENT PLAN - NSTXDEPRESINTERMD_PSY_ALL_CORE
C-SCOPE PREP INSTRUCTIONS MAILED   Continues on Effexor XR and Abilify. Two sessions of ECT so far. Continues on Effexor XR and Abilify. Two sessions of ECT so far. Individual therapy.

## 2024-05-04 NOTE — ED BEHAVIORAL HEALTH ASSESSMENT NOTE - NS ED BHA PLAN INPATIENT UNIT SELECTION YN
Laceration to left index finger and middle finger. Index finger with cut across finger approx. 1 1/2cm with minimal bleeding at this time. Wound appears deep. States he cut it with knife, middle finger with small cut .5cm. No active bleeding.    No

## 2024-06-03 NOTE — ECT TREATMENT NOTE - NSECTVSLABEL_PSY_ALL_CORE
Agree with surgical intervention. Pt will be getting left knee replaced first later this month. He will pursue a right tka likely later in the year or early next year.  
Telephone
.
.

## 2024-06-18 NOTE — ED PROVIDER NOTE - NS_EDPROVIDERDISPOUSERTYPE_ED_A_ED
Dapsone Pregnancy And Lactation Text: This medication is Pregnancy Category C and is not considered safe during pregnancy or breast feeding. High Dose Vitamin A Counseling: Side effects reviewed, pt to contact office should one occur. Benzoyl Peroxide Counseling: Patient counseled that medicine may cause skin irritation and bleach clothing.  In the event of skin irritation, the patient was advised to reduce the amount of the drug applied or use it less frequently.   The patient verbalized understanding of the proper use and possible adverse effects of benzoyl peroxide.  All of the patient's questions and concerns were addressed. Spironolactone Pregnancy And Lactation Text: This medication can cause feminization of the male fetus and should be avoided during pregnancy. The active metabolite is also found in breast milk. Topical Clindamycin Pregnancy And Lactation Text: This medication is Pregnancy Category B and is considered safe during pregnancy. It is unknown if it is excreted in breast milk. Tazorac Pregnancy And Lactation Text: This medication is not safe during pregnancy. It is unknown if this medication is excreted in breast milk. Azithromycin Counseling:  I discussed with the patient the risks of azithromycin including but not limited to GI upset, allergic reaction, drug rash, diarrhea, and yeast infections. Erythromycin Counseling:  I discussed with the patient the risks of erythromycin including but not limited to GI upset, allergic reaction, drug rash, diarrhea, increase in liver enzymes, and yeast infections. Minocycline Pregnancy And Lactation Text: This medication is Pregnancy Category D and not consider safe during pregnancy. It is also excreted in breast milk. Winlevi Counseling:  I discussed with the patient the risks of topical clascoterone including but not limited to erythema, scaling, itching, and stinging. Patient voiced their understanding. Topical Retinoid Pregnancy And Lactation Text: This medication is Pregnancy Category C. It is unknown if this medication is excreted in breast milk. Aklief counseling:  Patient advised to apply a pea-sized amount only at bedtime and wait 30 minutes after washing their face before applying.  If too drying, patient may add a non-comedogenic moisturizer.  The most commonly reported side effects including irritation, redness, scaling, dryness, stinging, burning, itching, and increased risk of sunburn.  The patient verbalized understanding of the proper use and possible adverse effects of retinoids.  All of the patient's questions and concerns were addressed. Birth Control Pills Pregnancy And Lactation Text: This medication should be avoided if pregnant and for the first 30 days post-partum. Isotretinoin Counseling: Patient should get monthly blood tests, not donate blood, not drive at night if vision affected, not share medication, and not undergo elective surgery for 6 months after tx completed. Side effects reviewed, pt to contact office should one occur. Azelaic Acid Counseling: Patient counseled that medicine may cause skin irritation and to avoid applying near the eyes.  In the event of skin irritation, the patient was advised to reduce the amount of the drug applied or use it less frequently.   The patient verbalized understanding of the proper use and possible adverse effects of azelaic acid.  All of the patient's questions and concerns were addressed. Bactrim Pregnancy And Lactation Text: This medication is Pregnancy Category D and is known to cause fetal risk.  It is also excreted in breast milk. Topical Sulfur Applications Counseling: Topical Sulfur Counseling: Patient counseled that this medication may cause skin irritation or allergic reactions.  In the event of skin irritation, the patient was advised to reduce the amount of the drug applied or use it less frequently.   The patient verbalized understanding of the proper use and possible adverse effects of topical sulfur application.  All of the patient's questions and concerns were addressed. Benzoyl Peroxide Pregnancy And Lactation Text: This medication is Pregnancy Category C. It is unknown if benzoyl peroxide is excreted in breast milk. Spironolactone Counseling: Patient advised regarding risks of diarrhea, abdominal pain, hyperkalemia, birth defects (for female patients), liver toxicity and renal toxicity. The patient may need blood work to monitor liver and kidney function and potassium levels while on therapy. The patient verbalized understanding of the proper use and possible adverse effects of spironolactone.  All of the patient's questions and concerns were addressed. Tetracycline Counseling: Patient counseled regarding possible photosensitivity and increased risk for sunburn.  Patient instructed to avoid sunlight, if possible.  When exposed to sunlight, patients should wear protective clothing, sunglasses, and sunscreen.  The patient was instructed to call the office immediately if the following severe adverse effects occur:  hearing changes, easy bruising/bleeding, severe headache, or vision changes.  The patient verbalized understanding of the proper use and possible adverse effects of tetracycline.  All of the patient's questions and concerns were addressed. Patient understands to avoid pregnancy while on therapy due to potential birth defects. Azithromycin Pregnancy And Lactation Text: This medication is considered safe during pregnancy and is also secreted in breast milk. Include Pregnancy/Lactation Warning?: No Doxycycline Counseling:  Patient counseled regarding possible photosensitivity and increased risk for sunburn.  Patient instructed to avoid sunlight, if possible.  When exposed to sunlight, patients should wear protective clothing, sunglasses, and sunscreen.  The patient was instructed to call the office immediately if the following severe adverse effects occur:  hearing changes, easy bruising/bleeding, severe headache, or vision changes.  The patient verbalized understanding of the proper use and possible adverse effects of doxycycline.  All of the patient's questions and concerns were addressed. High Dose Vitamin A Pregnancy And Lactation Text: High dose vitamin A therapy is contraindicated during pregnancy and breast feeding. Topical Clindamycin Counseling: Patient counseled that this medication may cause skin irritation or allergic reactions.  In the event of skin irritation, the patient was advised to reduce the amount of the drug applied or use it less frequently.   The patient verbalized understanding of the proper use and possible adverse effects of clindamycin.  All of the patient's questions and concerns were addressed. Azelaic Acid Pregnancy And Lactation Text: This medication is considered safe during pregnancy and breast feeding. Dapsone Counseling: I discussed with the patient the risks of dapsone including but not limited to hemolytic anemia, agranulocytosis, rashes, methemoglobinemia, kidney failure, peripheral neuropathy, headaches, GI upset, and liver toxicity.  Patients who start dapsone require monitoring including baseline LFTs and weekly CBCs for the first month, then every month thereafter.  The patient verbalized understanding of the proper use and possible adverse effects of dapsone.  All of the patient's questions and concerns were addressed. Isotretinoin Pregnancy And Lactation Text: This medication is Pregnancy Category X and is considered extremely dangerous during pregnancy. It is unknown if it is excreted in breast milk. Aklief Pregnancy And Lactation Text: It is unknown if this medication is safe to use during pregnancy.  It is unknown if this medication is excreted in breast milk.  Breastfeeding women should use the topical cream on the smallest area of the skin for the shortest time needed while breastfeeding.  Do not apply to nipple and areola. Tazorac Counseling:  Patient advised that medication is irritating and drying.  Patient may need to apply sparingly and wash off after an hour before eventually leaving it on overnight.  The patient verbalized understanding of the proper use and possible adverse effects of tazorac.  All of the patient's questions and concerns were addressed. Winlevi Pregnancy And Lactation Text: This medication is considered safe during pregnancy and breastfeeding. Detail Level: Zone Sarecycline Counseling: Patient advised regarding possible photosensitivity and discoloration of the teeth, skin, lips, tongue and gums.  Patient instructed to avoid sunlight, if possible.  When exposed to sunlight, patients should wear protective clothing, sunglasses, and sunscreen.  The patient was instructed to call the office immediately if the following severe adverse effects occur:  hearing changes, easy bruising/bleeding, severe headache, or vision changes.  The patient verbalized understanding of the proper use and possible adverse effects of sarecycline.  All of the patient's questions and concerns were addressed. Attending Attestation (For Attendings USE Only)... Topical Retinoid counseling:  Patient advised to apply a pea-sized amount only at bedtime and wait 30 minutes after washing their face before applying.  If too drying, patient may add a non-comedogenic moisturizer. The patient verbalized understanding of the proper use and possible adverse effects of retinoids.  All of the patient's questions and concerns were addressed. Bactrim Counseling:  I discussed with the patient the risks of sulfa antibiotics including but not limited to GI upset, allergic reaction, drug rash, diarrhea, dizziness, photosensitivity, and yeast infections.  Rarely, more serious reactions can occur including but not limited to aplastic anemia, agranulocytosis, methemoglobinemia, blood dyscrasias, liver or kidney failure, lung infiltrates or desquamative/blistering drug rashes. Doxycycline Pregnancy And Lactation Text: This medication is Pregnancy Category D and not consider safe during pregnancy. It is also excreted in breast milk but is considered safe for shorter treatment courses. Minocycline Counseling: Patient advised regarding possible photosensitivity and discoloration of the teeth, skin, lips, tongue and gums.  Patient instructed to avoid sunlight, if possible.  When exposed to sunlight, patients should wear protective clothing, sunglasses, and sunscreen.  The patient was instructed to call the office immediately if the following severe adverse effects occur:  hearing changes, easy bruising/bleeding, severe headache, or vision changes.  The patient verbalized understanding of the proper use and possible adverse effects of minocycline.  All of the patient's questions and concerns were addressed. Topical Sulfur Applications Pregnancy And Lactation Text: This medication is Pregnancy Category C and has an unknown safety profile during pregnancy. It is unknown if this topical medication is excreted in breast milk. Birth Control Pills Counseling: Birth Control Pill Counseling: I discussed with the patient the potential side effects of OCPs including but not limited to increased risk of stroke, heart attack, thrombophlebitis, deep venous thrombosis, hepatic adenomas, breast changes, GI upset, headaches, and depression.  The patient verbalized understanding of the proper use and possible adverse effects of OCPs. All of the patient's questions and concerns were addressed. Erythromycin Pregnancy And Lactation Text: This medication is Pregnancy Category B and is considered safe during pregnancy. It is also excreted in breast milk.

## 2024-07-10 ENCOUNTER — EMERGENCY (EMERGENCY)
Facility: HOSPITAL | Age: 69
LOS: 1 days | Discharge: HOME | End: 2024-07-10
Attending: EMERGENCY MEDICINE | Admitting: EMERGENCY MEDICINE
Payer: MEDICARE

## 2024-07-10 VITALS
RESPIRATION RATE: 18 BRPM | OXYGEN SATURATION: 96 % | SYSTOLIC BLOOD PRESSURE: 150 MMHG | DIASTOLIC BLOOD PRESSURE: 88 MMHG | HEART RATE: 82 BPM | TEMPERATURE: 98 F

## 2024-07-10 VITALS
DIASTOLIC BLOOD PRESSURE: 92 MMHG | SYSTOLIC BLOOD PRESSURE: 150 MMHG | RESPIRATION RATE: 18 BRPM | OXYGEN SATURATION: 95 % | TEMPERATURE: 98 F | WEIGHT: 115.08 LBS | HEIGHT: 62 IN | HEART RATE: 100 BPM

## 2024-07-10 DIAGNOSIS — F41.1 GENERALIZED ANXIETY DISORDER: ICD-10-CM

## 2024-07-10 DIAGNOSIS — I10 ESSENTIAL (PRIMARY) HYPERTENSION: ICD-10-CM

## 2024-07-10 DIAGNOSIS — R63.6 UNDERWEIGHT: ICD-10-CM

## 2024-07-10 DIAGNOSIS — Z56.0 UNEMPLOYMENT, UNSPECIFIED: ICD-10-CM

## 2024-07-10 DIAGNOSIS — R45.851 SUICIDAL IDEATIONS: ICD-10-CM

## 2024-07-10 DIAGNOSIS — F60.81 NARCISSISTIC PERSONALITY DISORDER: ICD-10-CM

## 2024-07-10 DIAGNOSIS — F60.3 BORDERLINE PERSONALITY DISORDER: ICD-10-CM

## 2024-07-10 DIAGNOSIS — F32.A DEPRESSION, UNSPECIFIED: ICD-10-CM

## 2024-07-10 DIAGNOSIS — F39 UNSPECIFIED MOOD [AFFECTIVE] DISORDER: ICD-10-CM

## 2024-07-10 PROCEDURE — 99284 EMERGENCY DEPT VISIT MOD MDM: CPT

## 2024-07-10 PROCEDURE — 99284 EMERGENCY DEPT VISIT MOD MDM: CPT | Mod: FS

## 2024-07-10 PROCEDURE — 90792 PSYCH DIAG EVAL W/MED SRVCS: CPT

## 2024-07-10 PROCEDURE — 82962 GLUCOSE BLOOD TEST: CPT

## 2024-07-10 RX ORDER — ALPRAZOLAM 2 MG/1
0.5 TABLET ORAL ONCE
Refills: 0 | Status: DISCONTINUED | OUTPATIENT
Start: 2024-07-10 | End: 2024-07-10

## 2024-07-10 RX ADMIN — ALPRAZOLAM 0.5 MILLIGRAM(S): 2 TABLET ORAL at 12:26

## 2024-07-10 SDOH — ECONOMIC STABILITY - INCOME SECURITY: UNEMPLOYMENT, UNSPECIFIED: Z56.0

## 2024-08-05 NOTE — BH INPATIENT PSYCHIATRY PROGRESS NOTE - NSBHCONSBHPROVDETAILS_PSY_A_CORE  FT
Pharmacy:  Discharge Counseling and Medication Reconciliation    Loulou Lucero  [address] [place] [id number]  [phone] (home)   79 year old female  PCP: Ele Marc    Allergies: Metronidazole, Pneumococcal vaccine, and Tramadol    Discharge Counseling:    Pharmacist met with patient today to review the medication portion of the After Visit Summary (with an emphasis on NEW medications) and to address patient's questions/concerns.    Summary of Education: Counseled patient on plan to start furosemide, postassium, lisinopril, benzonatate, and mucinex. Patient is going to [facility] for short-term rehab where facility staff will administer medications, but advised her that Lisinopril, Furosemide, and Potassium would likely by long-term medications given her diagnosis of hear failure. Advised her on increased urinary frequency with Furosemide, lower blood pressures with Lisinopril.    Materials Provided:  MedCounselor sheets printed from Clinical Pharmacology on: Furosemide, Lisinopril, Potassium, Benzonatate, Mucinex    Discharge Medication Reconciliation:    It has been determined that the patient has an adequate supply of medications available or which can be obtained from [facility]'s preferred pharmacy, which has been confirmed as: [id number].    Thank you for the consult.    Olvin Steele [facility]........August 5, 2024 2:40 PM      
Psychiatrist Dr. Saldaña [phone]  Therapist Marianne Pratt [phone]
Psychiatrist Dr. Saldaña [phone]  Therapist Marianne Pratt [phone]
Psychiatrist Dr. Saldaña [phone]  Therapist Marianne Pratt [phone]
Psychiatrist Dr. Saldaña [phone]  Therapist Marianne Pratt [phone]
Psychiatrist Dr. Saldaña [phone]  Therapist Marianne Pratt [phone]
Psychiatrist Dr. Saldaña [phone]  Therapist Marianne Pratt [phone]
Psychiatrist Dr. Saldaña [phone]  Therapist Marianne Pratt [phone]
Psychiatrist Dr. Saldaña [phone]  Therapist Marianne Pratt [phone]
Psychiatrist Dr. Saldaña [phone]  Therapist Marianne Pratt [phone]
Psychiatrist Dr. Saldaña [phone]  Therapist Marianne Pratt [phone]

## 2024-09-29 ENCOUNTER — NON-APPOINTMENT (OUTPATIENT)
Age: 69
End: 2024-09-29

## 2024-09-30 ENCOUNTER — OUTPATIENT (OUTPATIENT)
Dept: OUTPATIENT SERVICES | Facility: HOSPITAL | Age: 69
LOS: 1 days | End: 2024-09-30
Payer: MEDICARE

## 2024-09-30 ENCOUNTER — RESULT REVIEW (OUTPATIENT)
Age: 69
End: 2024-09-30

## 2024-09-30 ENCOUNTER — APPOINTMENT (OUTPATIENT)
Dept: CARDIOTHORACIC SURGERY | Facility: CLINIC | Age: 69
End: 2024-09-30
Payer: MEDICARE

## 2024-09-30 VITALS
DIASTOLIC BLOOD PRESSURE: 77 MMHG | WEIGHT: 127 LBS | HEIGHT: 62 IN | SYSTOLIC BLOOD PRESSURE: 119 MMHG | OXYGEN SATURATION: 98 % | BODY MASS INDEX: 23.37 KG/M2 | HEART RATE: 80 BPM | TEMPERATURE: 97.6 F

## 2024-09-30 DIAGNOSIS — I34.0 NONRHEUMATIC MITRAL (VALVE) INSUFFICIENCY: ICD-10-CM

## 2024-09-30 PROCEDURE — 93306 TTE W/DOPPLER COMPLETE: CPT | Mod: 26

## 2024-09-30 PROCEDURE — 93306 TTE W/DOPPLER COMPLETE: CPT

## 2024-09-30 PROCEDURE — 99214 OFFICE O/P EST MOD 30 MIN: CPT

## 2024-10-02 NOTE — ASSESSMENT
[FreeTextEntry1] : 68F with PMH of depression/anxiety, borderline personality disorder, narcissistic personality disorder (at a treatment program at [facility]/[place]), suicide attempt (2023), suicide ideation 2/2 to concern that she's headed to an asylum (7/2024), 6 psychiatric hospitalizations in the past 2 years, HLD, HTN, HFpEF, and severe s/p robotic MV repair (Dr. Alfonso at [facility] in 3/2022) with recent TTE done at [facility] on 9/10/24 showing moderate MR with mass on posterior mitral leaflet likely calcification can't r/o vegetation suggest MATIAS, now referred for evaluation of her mitral valve disease.  The patient is clinically stable, NYHA Class I. The ECHO was reviewed and independently interpreted by Dr. Alcaraz which showed mitral ring in place with mild to moderate MR, the results were discussed with the patient. Given good quality of life and ECHO showing only mild/moderate MR, there is no need for intervention at this time. Dr. Alcaraz recommends close clinical follow up with cardiologist, all questions answered.

## 2024-10-02 NOTE — ASSESSMENT
[FreeTextEntry1] : 68F with PMH of depression/anxiety, borderline personality disorder, narcissistic personality disorder (at a treatment program at [facility]/[place]), suicide attempt (2023), suicide ideation 2/2 to concern that she's headed to an asylum (7/2024), 6 psychiatric hospitalizations in the past 2 years, HLD, HTN, HFpEF, and severe s/p robotic MV repair (Dr. Alfonso at [facility] in 3/2022) with recent TTE done at [facility] on 9/10/24 showing moderate MR with mass on posterior mitral leaflet likely calcification can't r/o vegetation suggest MATIAS, now referred for evaluation of her mitral valve disease.  The patient is clinically stable, NYHA Class I. The ECHO was reviewed and independently interpreted by Dr. Alcaraz which showed mitral ring in place with mild to moderate MR, the results were discussed with the patient. Given good quality of life and ECHO showing only mild/moderate MR, there is no need for intervention at this time. Dr. Alcaraz recommends close clinical follow up with cardiologist, all questions answered.

## 2024-10-02 NOTE — HISTORY OF PRESENT ILLNESS
[FreeTextEntry1] : Referred by Dr. Castellon  68F with PMH of depression/anxiety, borderline personality disorder, narcissistic personality disorder (at a treatment program at [facility]/[place]), suicide attempt (2023), suicide ideation 2/2 to concern that she's headed to an asylum (7/2024), 6 psychiatric hospitalizations in the past 2 years, HLD, HTN, HFpEF, and severe s/p robotic MV repair (Dr. Alfonso at [facility] in 3/2022) with recent TTE done at [facility] on 9/10/24 showing moderate MR with mass on posterior mitral leaflet likely calcification can't r/o vegetation suggest MATIAS, now referred for evaluation of her mitral valve disease.   Last seen in [facility] in 11/2021 for severe MR and was referred for surgical consultation with Dr. Peña. Dr. Peña recommends mini-MVR but patient was not ready to proceed and will call Dr. Peña's team to set up surgical date when ready. Patient was lost to follow up and per HIE review she ultimately underwent a robotic MVRe with right at [facility] in 3/2024.   Today, patient reports she is feeling well with no complaints. She denies any SOB at rest or with exertion, chest pain, palpitations, dizziness, syncope, or LE edema. The patient walks daily without limitations.   She was admitted to [facility] for suicidal ideation and needed medical clearance for ECT therapy- however MD heard a murmur - ECHO showed questionable mass of posterior mitral leaflet.   *From a psych standpoint, the patient is feeling better and does not have any suicidal ideation at this time.

## 2024-10-02 NOTE — PHYSICAL EXAM
[No Acute Distress] : no acute distress [Normal Conjunctiva] : normal conjunctiva [Normal Venous Pressure] : normal venous pressure [No Carotid Bruit] : no carotid bruit [Normal S1, S2] : normal S1, S2 [Clear Lung Fields] : clear lung fields [Good Air Entry] : good air entry [Soft] : abdomen soft [Normal Gait] : normal gait [No Edema] : no edema [No Rash] : no rash [Moves all extremities] : moves all extremities [Alert and Oriented] : alert and oriented [de-identified] : + soft ejection murmur

## 2024-10-02 NOTE — ASSESSMENT
[FreeTextEntry1] : 68F with PMH of depression/anxiety, borderline personality disorder, narcissistic personality disorder (at a treatment program at [facility]/[place]), suicide attempt (2023), suicide ideation 2/2 to concern that she's headed to an asylum (7/2024), 6 psychiatric hospitalizations in the past 2 years, HLD, HTN, HFpEF, and severe s/p robotic MV repair (Dr. Alfonso at [facility] in 3/2022) with recent TTE done at [facility] on 9/10/24 showing moderate MR with mass on posterior mitral leaflet likely calcification can't r/o vegetation suggest MATIAS, now referred for evaluation of her mitral valve disease.  The patient is clinically stable, NYHA Class I. The ECHO was reviewed and independently interpreted by Dr. Alcaraz which showed mitral ring in place with mild to moderate MR, the results were discussed with the patient. Given good quality of life and ECHO showing only mild/moderate MR, there is no need for intervention at this time. Dr. Alcaraz recommends close clinical follow up with cardiologist, all questions answered.

## 2024-10-02 NOTE — PLAN
[TextEntry] : 1) Continue care with Dr. Vargas  2) GDMT, f/u gen cardiiology care, abx prophylaxis 3) No further cardiac workup  I, Dr. Dann Alcaraz, personally performed the evaluation and management (E/M) services for this established patient who presents today with (a) new problem(s)/exacerbation of (an) existing condition(s). That E/M includes conducting the clinically appropriate interval history &/or exam, assessing all new/exacerbated conditions, and establishing a new plan of care. Today, my CARLOS, noted herewith, was here to observe my evaluation and management service for this new problem/exacerbated condition and follow the plan of care established by me going forward.    .

## 2024-10-02 NOTE — PHYSICAL EXAM
[No Acute Distress] : no acute distress [Normal Conjunctiva] : normal conjunctiva [Normal Venous Pressure] : normal venous pressure [No Carotid Bruit] : no carotid bruit [Normal S1, S2] : normal S1, S2 [Clear Lung Fields] : clear lung fields [Good Air Entry] : good air entry [Soft] : abdomen soft [Normal Gait] : normal gait [No Edema] : no edema [No Rash] : no rash [Moves all extremities] : moves all extremities [Alert and Oriented] : alert and oriented [de-identified] : + soft ejection murmur

## 2024-10-02 NOTE — HISTORY OF PRESENT ILLNESS
[FreeTextEntry1] : Referred by Dr. Castellon  68F with PMH of depression/anxiety, borderline personality disorder, narcissistic personality disorder (at a treatment program at [facility]/[place]), suicide attempt (2023), suicide ideation 2/2 to concern that she's headed to an asylum (7/2024), 6 psychiatric hospitalizations in the past 2 years, HLD, HTN, HFpEF, and severe s/p robotic MV repair (Dr. Alfonso at [facility] in 3/2022) with recent TTE done at [facility] on 9/10/24 showing moderate MR with mass on posterior mitral leaflet likely calcification can't r/o vegetation suggest MATIAS, now referred for evaluation of her mitral valve disease.   Last seen in [facility] in 11/2021 for severe MR and was referred for surgical consultation with Dr. Peña. Dr. Peña recommends mini-MVR but patient was not ready to proceed and will call Dr. Peña's team to set up surgical date when ready. Patient was lost to follow up and per HIE review she ultimately underwent a robotic MVRe with right at [facility] in 3/2024.   Today, patient reports she is feeling well with no complaints. She denies any SOB at rest or with exertion, chest pain, palpitations, dizziness, syncope, or LE edema. The patient walks daily without limitations.   She was admitted to [facility] for suicidal ideation and needed medical clearance for ECT therapy- however MD heard a murmur - ECHO showed questionable mass of posterior mitral leaflet.   *From a psych standpoint, the patient is feeling better and does not have any suicidal ideation at this time.

## 2024-10-02 NOTE — RESULTS/DATA
[TextEntry] : ECHO on 09/30/2024- CONCLUSIONS:   1. Normal left and right ventricular size and systolic function.  2. Normal atria.  3. An annuloplasty ring is noted in the mitral position.  4. Mild-to-moderate mitral regurgitation.  5. No other significant valvular disease.

## 2024-10-02 NOTE — ASSESSMENT
[FreeTextEntry1] : 68F with PMH of depression/anxiety, borderline personality disorder, narcissistic personality disorder (at a treatment program at [facility]/[place]), suicide attempt (2023), suicide ideation 2/2 to concern that she's headed to an asylum (7/2024), 6 psychiatric hospitalizations in the past 2 years, HLD, HTN, HFpEF, and severe s/p robotic MV repair (Dr. Alfonso at [facility] in 3/2022) with recent TTE done at [facility] on 9/10/24 showing moderate MR with mass on posterior mitral leaflet likely calcification can't r/o vegetation suggest MATIAS, now referred for evaluation of her mitral valve disease.  The patient is clinically stable, NYHA Class I. The ECHO was reviewed and independently interpreted by Dr. Alcaraz which showed mitral ring in place with mild to moderate MR, the results were discussed with the patient. Given good quality of life and ECHO showing only mild/moderate MR, there is no need for intervention at this time. Dr. Alcaraz recommends close clinical follow up with cardiologist, all questions answered.

## 2024-10-02 NOTE — HISTORY OF PRESENT ILLNESS
[FreeTextEntry1] : Referred by Dr. Castellon  68F with PMH of depression/anxiety, borderline personality disorder, narcissistic personality disorder (at a treatment program at [facility]), suicide attempt (2023), suicide ideation 2/2 to concern that she's headed to an asylum (7/2024), 6 psychiatric hospitalizations in the past 2 years, HLD, HTN, HFpEF, and severe s/p robotic MV repair (Dr. Alfonso at [facility] in 3/2022) with recent TTE done at [facility] on 9/10/24 showing moderate MR with mass on posterior mitral leaflet likely calcification can't r/o vegetation suggest MATIAS, now referred for evaluation of her mitral valve disease.   Last seen in [facility] in 11/2021 for severe MR and was referred for surgical consultation with Dr. Peña. Dr. Peña recommends mini-MVR but patient was not ready to proceed and will call Dr. Peña's team to set up surgical date when ready. Patient was lost to follow up and per HIE review she ultimately underwent a robotic MVRe with right at [facility] in 3/2024.   Today, patient reports she is feeling well with no complaints. She denies any SOB at rest or with exertion, chest pain, palpitations, dizziness, syncope, or LE edema. The patient walks daily without limitations.   She was admitted to [facility] for suicidal ideation and needed medical clearance for ECT therapy- however MD heard a murmur - ECHO showed questionable mass of posterior mitral leaflet.   *From a psych standpoint, the patient is feeling better and does not have any suicidal ideation at this time.

## 2024-10-02 NOTE — HISTORY OF PRESENT ILLNESS
[FreeTextEntry1] : Referred by Dr. Castellon  68F with PMH of depression/anxiety, borderline personality disorder, narcissistic personality disorder (at a treatment program at [facility]/[place]), suicide attempt (2023), suicide ideation 2/2 to concern that she's headed to an asylum (7/2024), 6 psychiatric hospitalizations in the past 2 years, HLD, HTN, HFpEF, and severe s/p robotic MV repair (Dr. Alfonso at [facility] in 3/2022) with recent TTE done at [facility] on 9/10/24 showing moderate MR with mass on posterior mitral leaflet likely calcification can't r/o vegetation suggest MATIAS, now referred for evaluation of her mitral valve disease.   Last seen in [facility] in 11/2021 for severe MR and was referred for surgical consultation with Dr. Peña. Dr. Peña recommends mini-MVR but patient was not ready to proceed and will call Dr. Peña's team to set up surgical date when ready. Patient was lost to follow up and per HIE review she ultimately underwent a robotic MVRe with right at [facility] in 3/2024.   Today, patient reports she is feeling well with no complaints. She denies any SOB at rest or with exertion, chest pain, palpitations, dizziness, syncope, or LE edema. The patient walks daily without limitations.   She was admitted to [facility] for suicidal ideation and needed medical clearance for ECT therapy- however MD heard a murmur - ECHO showed questionable mass of posterior mitral leaflet.   *From a psych standpoint, the patient is feeling better and does not have any suicidal ideation at this time.

## 2024-10-02 NOTE — PHYSICAL EXAM
[No Acute Distress] : no acute distress [Normal Conjunctiva] : normal conjunctiva [Normal Venous Pressure] : normal venous pressure [No Carotid Bruit] : no carotid bruit [Normal S1, S2] : normal S1, S2 [Clear Lung Fields] : clear lung fields [Good Air Entry] : good air entry [Soft] : abdomen soft [Normal Gait] : normal gait [No Edema] : no edema [No Rash] : no rash [Moves all extremities] : moves all extremities [Alert and Oriented] : alert and oriented [de-identified] : + soft ejection murmur

## 2024-10-02 NOTE — PHYSICAL EXAM
[No Acute Distress] : no acute distress [Normal Conjunctiva] : normal conjunctiva [Normal Venous Pressure] : normal venous pressure [No Carotid Bruit] : no carotid bruit [Normal S1, S2] : normal S1, S2 [Clear Lung Fields] : clear lung fields [Good Air Entry] : good air entry [Soft] : abdomen soft [Normal Gait] : normal gait [No Edema] : no edema [No Rash] : no rash [Moves all extremities] : moves all extremities [Alert and Oriented] : alert and oriented [de-identified] : + soft ejection murmur

## 2024-10-15 ENCOUNTER — APPOINTMENT (OUTPATIENT)
Dept: CARDIOTHORACIC SURGERY | Facility: CLINIC | Age: 69
End: 2024-10-15

## 2025-04-06 NOTE — BH INPATIENT PSYCHIATRY PROGRESS NOTE - NSBHMETABOLIC_PSY_ALL_CORE_FT
BMI: BMI (kg/m2): 22.1 (07-16-22 @ 21:20)  HbA1c:   Glucose:   BP: 153/94 (07-18-22 @ 09:00) (138/92 - 154/97)  Lipid Panel:  General BMI: BMI (kg/m2): 22.1 (07-16-22 @ 21:20)  HbA1c:   Glucose:   BP: 168/103 (07-18-22 @ 16:38) (138/92 - 168/103)  Lipid Panel:

## 2025-06-13 ENCOUNTER — APPOINTMENT (OUTPATIENT)
Dept: ENDOCRINOLOGY | Facility: CLINIC | Age: 70
End: 2025-06-13
